# Patient Record
Sex: MALE | Race: WHITE | NOT HISPANIC OR LATINO
[De-identification: names, ages, dates, MRNs, and addresses within clinical notes are randomized per-mention and may not be internally consistent; named-entity substitution may affect disease eponyms.]

---

## 2019-12-04 ENCOUNTER — TRANSCRIPTION ENCOUNTER (OUTPATIENT)
Age: 74
End: 2019-12-04

## 2020-02-16 ENCOUNTER — TRANSCRIPTION ENCOUNTER (OUTPATIENT)
Age: 75
End: 2020-02-16

## 2020-02-22 ENCOUNTER — TRANSCRIPTION ENCOUNTER (OUTPATIENT)
Age: 75
End: 2020-02-22

## 2024-03-04 ENCOUNTER — NON-APPOINTMENT (OUTPATIENT)
Age: 79
End: 2024-03-04

## 2024-03-05 ENCOUNTER — INPATIENT (INPATIENT)
Facility: HOSPITAL | Age: 79
LOS: 9 days | Discharge: HOME HEALTH | DRG: 291 | End: 2024-03-15
Attending: INTERNAL MEDICINE | Admitting: INTERNAL MEDICINE
Payer: MEDICARE

## 2024-03-05 VITALS
RESPIRATION RATE: 18 BRPM | TEMPERATURE: 98 F | DIASTOLIC BLOOD PRESSURE: 76 MMHG | HEART RATE: 75 BPM | OXYGEN SATURATION: 95 % | SYSTOLIC BLOOD PRESSURE: 127 MMHG

## 2024-03-05 DIAGNOSIS — R06.02 SHORTNESS OF BREATH: ICD-10-CM

## 2024-03-05 LAB
ALBUMIN SERPL ELPH-MCNC: 3.8 G/DL — SIGNIFICANT CHANGE UP (ref 3.5–5.2)
ALP SERPL-CCNC: 114 U/L — SIGNIFICANT CHANGE UP (ref 30–115)
ALT FLD-CCNC: 16 U/L — SIGNIFICANT CHANGE UP (ref 0–41)
ANION GAP SERPL CALC-SCNC: 13 MMOL/L — SIGNIFICANT CHANGE UP (ref 7–14)
ANISOCYTOSIS BLD QL: SLIGHT — SIGNIFICANT CHANGE UP
AST SERPL-CCNC: 18 U/L — SIGNIFICANT CHANGE UP (ref 0–41)
BASE EXCESS BLDV CALC-SCNC: -1.9 MMOL/L — SIGNIFICANT CHANGE UP (ref -2–3)
BASOPHILS # BLD AUTO: 0.03 K/UL — SIGNIFICANT CHANGE UP (ref 0–0.2)
BASOPHILS NFR BLD AUTO: 0.9 % — SIGNIFICANT CHANGE UP (ref 0–1)
BILIRUB SERPL-MCNC: 1.8 MG/DL — HIGH (ref 0.2–1.2)
BUN SERPL-MCNC: 21 MG/DL — HIGH (ref 10–20)
CA-I SERPL-SCNC: 1.22 MMOL/L — SIGNIFICANT CHANGE UP (ref 1.15–1.33)
CALCIUM SERPL-MCNC: 8.9 MG/DL — SIGNIFICANT CHANGE UP (ref 8.4–10.5)
CHLORIDE SERPL-SCNC: 110 MMOL/L — SIGNIFICANT CHANGE UP (ref 98–110)
CO2 SERPL-SCNC: 22 MMOL/L — SIGNIFICANT CHANGE UP (ref 17–32)
CREAT SERPL-MCNC: 1 MG/DL — SIGNIFICANT CHANGE UP (ref 0.7–1.5)
DACRYOCYTES BLD QL SMEAR: SLIGHT — SIGNIFICANT CHANGE UP
EGFR: 77 ML/MIN/1.73M2 — SIGNIFICANT CHANGE UP
EOSINOPHIL # BLD AUTO: 0.03 K/UL — SIGNIFICANT CHANGE UP (ref 0–0.7)
EOSINOPHIL NFR BLD AUTO: 0.9 % — SIGNIFICANT CHANGE UP (ref 0–8)
GAS PNL BLDV: 142 MMOL/L — SIGNIFICANT CHANGE UP (ref 136–145)
GAS PNL BLDV: SIGNIFICANT CHANGE UP
GAS PNL BLDV: SIGNIFICANT CHANGE UP
GIANT PLATELETS BLD QL SMEAR: PRESENT — SIGNIFICANT CHANGE UP
GLUCOSE SERPL-MCNC: 92 MG/DL — SIGNIFICANT CHANGE UP (ref 70–99)
HCO3 BLDV-SCNC: 24 MMOL/L — SIGNIFICANT CHANGE UP (ref 22–29)
HCT VFR BLD CALC: 37.6 % — LOW (ref 42–52)
HCT VFR BLDA CALC: 36 % — LOW (ref 39–51)
HGB BLD CALC-MCNC: 11.9 G/DL — LOW (ref 12.6–17.4)
HGB BLD-MCNC: 11.6 G/DL — LOW (ref 14–18)
LACTATE BLDV-MCNC: 2.8 MMOL/L — HIGH (ref 0.5–2)
LYMPHOCYTES # BLD AUTO: 1.06 K/UL — LOW (ref 1.2–3.4)
LYMPHOCYTES # BLD AUTO: 36.4 % — SIGNIFICANT CHANGE UP (ref 20.5–51.1)
MANUAL SMEAR VERIFICATION: SIGNIFICANT CHANGE UP
MCHC RBC-ENTMCNC: 27.2 PG — SIGNIFICANT CHANGE UP (ref 27–31)
MCHC RBC-ENTMCNC: 30.9 G/DL — LOW (ref 32–37)
MCV RBC AUTO: 88.3 FL — SIGNIFICANT CHANGE UP (ref 80–94)
MICROCYTES BLD QL: SLIGHT — SIGNIFICANT CHANGE UP
MONOCYTES # BLD AUTO: 0.43 K/UL — SIGNIFICANT CHANGE UP (ref 0.1–0.6)
MONOCYTES NFR BLD AUTO: 14.6 % — HIGH (ref 1.7–9.3)
MYELOCYTES NFR BLD: 0.9 % — HIGH (ref 0–0)
NEUTROPHILS # BLD AUTO: 1.25 K/UL — LOW (ref 1.4–6.5)
NEUTROPHILS NFR BLD AUTO: 42.7 % — SIGNIFICANT CHANGE UP (ref 42.2–75.2)
NRBC # BLD: 12 /100 WBCS — HIGH (ref 0–0)
NRBC # BLD: SIGNIFICANT CHANGE UP /100 WBCS (ref 0–0)
NT-PROBNP SERPL-SCNC: HIGH PG/ML (ref 0–300)
PCO2 BLDV: 43 MMHG — SIGNIFICANT CHANGE UP (ref 42–55)
PH BLDV: 7.35 — SIGNIFICANT CHANGE UP (ref 7.32–7.43)
PLAT MORPH BLD: NORMAL — SIGNIFICANT CHANGE UP
PLATELET # BLD AUTO: 63 K/UL — LOW (ref 130–400)
PMV BLD: 11.9 FL — HIGH (ref 7.4–10.4)
PO2 BLDV: 18 MMHG — LOW (ref 25–45)
POIKILOCYTOSIS BLD QL AUTO: SIGNIFICANT CHANGE UP
POTASSIUM BLDV-SCNC: 3.3 MMOL/L — LOW (ref 3.5–5.1)
POTASSIUM SERPL-MCNC: 3.4 MMOL/L — LOW (ref 3.5–5)
POTASSIUM SERPL-SCNC: 3.4 MMOL/L — LOW (ref 3.5–5)
PROT SERPL-MCNC: 7.6 G/DL — SIGNIFICANT CHANGE UP (ref 6–8)
RBC # BLD: 4.26 M/UL — LOW (ref 4.7–6.1)
RBC # FLD: 21.1 % — HIGH (ref 11.5–14.5)
RBC BLD AUTO: ABNORMAL
SAO2 % BLDV: 16 % — LOW (ref 67–88)
SMUDGE CELLS # BLD: PRESENT — SIGNIFICANT CHANGE UP
SODIUM SERPL-SCNC: 145 MMOL/L — SIGNIFICANT CHANGE UP (ref 135–146)
TROPONIN T, HIGH SENSITIVITY RESULT: 42 NG/L — HIGH (ref 6–21)
TROPONIN T, HIGH SENSITIVITY RESULT: 42 NG/L — HIGH (ref 6–21)
VARIANT LYMPHS # BLD: 3.6 % — SIGNIFICANT CHANGE UP (ref 0–5)
WBC # BLD: 2.92 K/UL — LOW (ref 4.8–10.8)
WBC # FLD AUTO: 2.92 K/UL — LOW (ref 4.8–10.8)

## 2024-03-05 PROCEDURE — 97110 THERAPEUTIC EXERCISES: CPT | Mod: GP

## 2024-03-05 PROCEDURE — 87340 HEPATITIS B SURFACE AG IA: CPT

## 2024-03-05 PROCEDURE — 97161 PT EVAL LOW COMPLEX 20 MIN: CPT | Mod: GP

## 2024-03-05 PROCEDURE — 85025 COMPLETE CBC W/AUTO DIFF WBC: CPT

## 2024-03-05 PROCEDURE — 87389 HIV-1 AG W/HIV-1&-2 AB AG IA: CPT

## 2024-03-05 PROCEDURE — 88264 CHROMOSOME ANALYSIS 20-25: CPT

## 2024-03-05 PROCEDURE — 88275 CYTOGENETICS 100-300: CPT

## 2024-03-05 PROCEDURE — 71045 X-RAY EXAM CHEST 1 VIEW: CPT

## 2024-03-05 PROCEDURE — 87205 SMEAR GRAM STAIN: CPT

## 2024-03-05 PROCEDURE — 76705 ECHO EXAM OF ABDOMEN: CPT

## 2024-03-05 PROCEDURE — 81451 HL NEO GSAP 5-50 RNA ALYS: CPT

## 2024-03-05 PROCEDURE — 74177 CT ABD & PELVIS W/CONTRAST: CPT | Mod: MC

## 2024-03-05 PROCEDURE — 81450 HL NEO GSAP 5-50DNA/DNA&RNA: CPT

## 2024-03-05 PROCEDURE — 86803 HEPATITIS C AB TEST: CPT

## 2024-03-05 PROCEDURE — 93306 TTE W/DOPPLER COMPLETE: CPT

## 2024-03-05 PROCEDURE — 83010 ASSAY OF HAPTOGLOBIN QUANT: CPT

## 2024-03-05 PROCEDURE — 84484 ASSAY OF TROPONIN QUANT: CPT

## 2024-03-05 PROCEDURE — 93010 ELECTROCARDIOGRAM REPORT: CPT

## 2024-03-05 PROCEDURE — 99223 1ST HOSP IP/OBS HIGH 75: CPT

## 2024-03-05 PROCEDURE — 83540 ASSAY OF IRON: CPT

## 2024-03-05 PROCEDURE — 36415 COLL VENOUS BLD VENIPUNCTURE: CPT

## 2024-03-05 PROCEDURE — 83735 ASSAY OF MAGNESIUM: CPT

## 2024-03-05 PROCEDURE — 86706 HEP B SURFACE ANTIBODY: CPT

## 2024-03-05 PROCEDURE — 80061 LIPID PANEL: CPT

## 2024-03-05 PROCEDURE — 86038 ANTINUCLEAR ANTIBODIES: CPT

## 2024-03-05 PROCEDURE — 88305 TISSUE EXAM BY PATHOLOGIST: CPT

## 2024-03-05 PROCEDURE — 88341 IMHCHEM/IMCYTCHM EA ADD ANTB: CPT

## 2024-03-05 PROCEDURE — 88271 CYTOGENETICS DNA PROBE: CPT

## 2024-03-05 PROCEDURE — 85045 AUTOMATED RETICULOCYTE COUNT: CPT

## 2024-03-05 PROCEDURE — 88184 FLOWCYTOMETRY/ TC 1 MARKER: CPT

## 2024-03-05 PROCEDURE — 85610 PROTHROMBIN TIME: CPT

## 2024-03-05 PROCEDURE — 85730 THROMBOPLASTIN TIME PARTIAL: CPT

## 2024-03-05 PROCEDURE — 94640 AIRWAY INHALATION TREATMENT: CPT

## 2024-03-05 PROCEDURE — 88342 IMHCHEM/IMCYTCHM 1ST ANTB: CPT

## 2024-03-05 PROCEDURE — 83921 ORGANIC ACID SINGLE QUANT: CPT

## 2024-03-05 PROCEDURE — 88313 SPECIAL STAINS GROUP 2: CPT

## 2024-03-05 PROCEDURE — 83036 HEMOGLOBIN GLYCOSYLATED A1C: CPT

## 2024-03-05 PROCEDURE — 97116 GAIT TRAINING THERAPY: CPT | Mod: GP

## 2024-03-05 PROCEDURE — 93971 EXTREMITY STUDY: CPT | Mod: RT

## 2024-03-05 PROCEDURE — 82728 ASSAY OF FERRITIN: CPT

## 2024-03-05 PROCEDURE — 88237 TISSUE CULTURE BONE MARROW: CPT

## 2024-03-05 PROCEDURE — 88280 CHROMOSOME KARYOTYPE STUDY: CPT

## 2024-03-05 PROCEDURE — 80053 COMPREHEN METABOLIC PANEL: CPT

## 2024-03-05 PROCEDURE — 71260 CT THORAX DX C+: CPT | Mod: MC

## 2024-03-05 PROCEDURE — 82746 ASSAY OF FOLIC ACID SERUM: CPT

## 2024-03-05 PROCEDURE — 83880 ASSAY OF NATRIURETIC PEPTIDE: CPT

## 2024-03-05 PROCEDURE — 83605 ASSAY OF LACTIC ACID: CPT

## 2024-03-05 PROCEDURE — 80048 BASIC METABOLIC PNL TOTAL CA: CPT

## 2024-03-05 PROCEDURE — 99285 EMERGENCY DEPT VISIT HI MDM: CPT | Mod: FS

## 2024-03-05 PROCEDURE — 83550 IRON BINDING TEST: CPT

## 2024-03-05 PROCEDURE — 86704 HEP B CORE ANTIBODY TOTAL: CPT

## 2024-03-05 PROCEDURE — 84443 ASSAY THYROID STIM HORMONE: CPT

## 2024-03-05 PROCEDURE — 83615 LACTATE (LD) (LDH) ENZYME: CPT

## 2024-03-05 PROCEDURE — 71046 X-RAY EXAM CHEST 2 VIEWS: CPT | Mod: 26

## 2024-03-05 PROCEDURE — 88311 DECALCIFY TISSUE: CPT

## 2024-03-05 PROCEDURE — 82607 VITAMIN B-12: CPT

## 2024-03-05 PROCEDURE — 97530 THERAPEUTIC ACTIVITIES: CPT | Mod: GP

## 2024-03-05 PROCEDURE — 86880 COOMBS TEST DIRECT: CPT

## 2024-03-05 PROCEDURE — 88185 FLOWCYTOMETRY/TC ADD-ON: CPT

## 2024-03-05 RX ORDER — INFLUENZA VIRUS VACCINE 15; 15; 15; 15 UG/.5ML; UG/.5ML; UG/.5ML; UG/.5ML
0.7 SUSPENSION INTRAMUSCULAR ONCE
Refills: 0 | Status: DISCONTINUED | OUTPATIENT
Start: 2024-03-05 | End: 2024-03-15

## 2024-03-05 RX ORDER — ENOXAPARIN SODIUM 100 MG/ML
40 INJECTION SUBCUTANEOUS EVERY 24 HOURS
Refills: 0 | Status: DISCONTINUED | OUTPATIENT
Start: 2024-03-05 | End: 2024-03-07

## 2024-03-05 RX ORDER — FUROSEMIDE 40 MG
40 TABLET ORAL EVERY 12 HOURS
Refills: 0 | Status: DISCONTINUED | OUTPATIENT
Start: 2024-03-05 | End: 2024-03-07

## 2024-03-05 RX ORDER — POTASSIUM CHLORIDE 20 MEQ
40 PACKET (EA) ORAL ONCE
Refills: 0 | Status: COMPLETED | OUTPATIENT
Start: 2024-03-05 | End: 2024-03-05

## 2024-03-05 RX ORDER — FUROSEMIDE 40 MG
40 TABLET ORAL ONCE
Refills: 0 | Status: COMPLETED | OUTPATIENT
Start: 2024-03-05 | End: 2024-03-05

## 2024-03-05 RX ADMIN — Medication 40 MILLIEQUIVALENT(S): at 21:16

## 2024-03-05 RX ADMIN — Medication 40 MILLIGRAM(S): at 20:12

## 2024-03-05 NOTE — H&P ADULT - NSHPPHYSICALEXAM_GEN_ALL_CORE
LOS:     VITALS:   T(C): 36.4 (03-05-24 @ 19:20), Max: 36.4 (03-05-24 @ 14:28)  HR: 98 (03-05-24 @ 19:20) (75 - 98)  BP: 137/71 (03-05-24 @ 19:20) (127/76 - 137/71)  RR: 18 (03-05-24 @ 19:20) (18 - 18)  SpO2: 98% (03-05-24 @ 19:20) (95% - 98%)    GENERAL: NAD, lying in bed comfortably  HEAD:  Atraumatic, Normocephalic  EYES: EOMI, PERRLA, conjunctiva and sclera clear  ENT: Moist mucous membranes  NECK: Supple, No JVD  CHEST/LUNG: Clear to auscultation bilaterally; No rales, rhonchi, wheezing, or rubs. Unlabored respirations  HEART: Regular rate and rhythm; No murmurs, rubs, or gallops  ABDOMEN: BSx4; Soft, nontender, nondistended  EXTREMITIES:  2+ Peripheral Pulses, brisk capillary refill. No clubbing, cyanosis, or edema  NERVOUS SYSTEM:  A&Ox3, 5/5 motor bilateral upper and lower extremity, no sensory deficit   SKIN: No rashes or lesions LOS:     VITALS:   T(C): 36.4 (03-05-24 @ 19:20), Max: 36.4 (03-05-24 @ 14:28)  HR: 98 (03-05-24 @ 19:20) (75 - 98)  BP: 137/71 (03-05-24 @ 19:20) (127/76 - 137/71)  RR: 18 (03-05-24 @ 19:20) (18 - 18)  SpO2: 98% (03-05-24 @ 19:20) (95% - 98%)    GENERAL: NAD, lying in bed comfortably  HEAD:  Atraumatic, Normocephalic  EYES: EOMI, PERRLA, conjunctiva and sclera clear  ENT: Moist mucous membranes  NECK: Supple, No JVD  CHEST/LUNG: bilateral diffuse crackles more at bases   HEART: Regular rate and rhythm; No murmurs, rubs, or gallops  ABDOMEN: BSx4; Soft, nontender, nondistended  EXTREMITIES:  2+ Peripheral Pulses, +1 bilateral ankle edema   NERVOUS SYSTEM:  A&Ox3, 5/5 motor bilateral upper and lower extremity, no sensory deficit   SKIN: No rashes or lesions

## 2024-03-05 NOTE — ED ADULT TRIAGE NOTE - CHIEF COMPLAINT QUOTE
Pt BIBA from urgent care c/o feeling sob x 1 week, (covid test done at [facility] was negative)

## 2024-03-05 NOTE — ED PROVIDER NOTE - ATTENDING APP SHARED VISIT CONTRIBUTION OF CARE
80 yo m denies pmh  pt presents for eval of sob. sx worsening over the past week. pt went to  and was ref to ED.  pt former smoker. no known sick contacts. pt had swab at  and covid was negative.  no chest pain. + ankle swelling- chronic per pt.  no cough.  sob constant.     vss  gen- NAD, aaox3  card-rrr  lungs-ctab, no wheezing or rhonchi  abd-sntnd, no guarding or rebound  neuro- full str/sensation, cn ii-xii grossly intact, normal coordination   LE- mild ankle swelling b/l, no calf tenderness

## 2024-03-05 NOTE — H&P ADULT - ASSESSMENT
#Shortness of breath 2/2 to volume overload   #R/O new onset CHF   - hemodynamically stable   - spo2 98% on 3.5 L NC   - trop 42 --. 42, pro-BN [id number]  - VBG: lactate 2.8, pH 7.35, pCO2 43    - EKG: sinus with PVCs, LVH   - CXR bilateral basilar opacities, hilar congestion, increased vascular markings   - s/p lasix 40 mg IV once in ED   - c/w lasix 40 mg IV BID   - strict I&O  - get TTE   - monitor on tele   - repeat tropx3   - monitor oxygen requirements   - repeat CXR in AM   - f/u covid/flu/RSV   - keep K > 4 and Mg > 2   - once euvolemic and respiratory status improves needs cardio eval for   ischemic workup ( r/o ischemic heart disease as cause of new onset HF )   - check A1C, lipid panel and TSH   - monitor BP, consider starting ACE/ARB if BP tolerates     #Pancytopenia   - WBC 2.92, Hb 11.6, MCV 88.3, Plt 63, ANC 1250   - f/u retic count, LDH, iron studies, B12, folate, dino test, TSH   HIV, hep B and C profile, INR   - f/u RUQ US to r/o liver disease as cause of pancytopenia   - if all workup negative, call hem/onc for BM biopsy     #DVT ppx: lovenox   #GI ppx: NA   #Diet: DASH   #Activity: as tolerated   #Dispo: tele  80 yo with no PMH presents for eval of new onset shortness of breath for the past few days. Patient reported that he started feeling SOB for the past few days at rest and on exertion and it was progressively worsening where he couldn't sleep for the past 2 nights bcz of orthopnea. He reported associated bilateral ankle swelling.    #Shortness of breath 2/2 to volume overload   #R/O new onset CHF   - hemodynamically stable   - spo2 98% on 3.5 L NC   - trop 42 --. 42, pro-BN 11005  - VBG: lactate 2.8, pH 7.35, pCO2 43    - EKG: sinus with PVCs, LVH   - CXR bilateral basilar opacities, hilar congestion, increased vascular markings   - s/p lasix 40 mg IV once in ED   - c/w lasix 40 mg IV BID   - strict I&O  - get TTE   - monitor on tele   - repeat tropx3   - monitor oxygen requirements   - repeat CXR in AM   - f/u covid/flu/RSV   - keep K > 4 and Mg > 2   - once euvolemic and respiratory status improves needs cardio eval for   ischemic workup ( r/o ischemic heart disease as cause of new onset HF )   - check A1C, lipid panel and TSH   - monitor BP, consider starting ACE/ARB if BP tolerates     #Pancytopenia   - WBC 2.92, Hb 11.6, MCV 88.3, Plt 63, ANC 1250   - no B symptoms, no hx of alcohol use   - f/u retic count, LDH, iron studies, B12, folate, dino test, TSH   HIV, hep B and C profile, INR   - f/u RUQ US to r/o liver disease as cause of pancytopenia   - if all workup negative, call hem/onc for BM biopsy     #DVT ppx: lovenox   #GI ppx: NA   #Diet: DASH   #Activity: as tolerated   #Dispo: tele   *** Zulma Jarvis at bedside, diagnosis and workup explained in details, requesting to be involved and updated about all workup and findings by primary team.

## 2024-03-05 NOTE — ED PROVIDER NOTE - CLINICAL SUMMARY MEDICAL DECISION MAKING FREE TEXT BOX
Throughout ED observation period, pt remained clinically and hemodynamically stable.  labs significant for pancytopenia, no anemia req transfusion  trop elevated  IOllie- ED Attending, interpreted the EKG- sinus rhythm, rate-93 , no acute ischaemic changes, no high degree blocks  Ollie LAW- ED Attending, interpreted the chest x-ray - b/l opacities  will admit for c/f new onset chf, pancytopenia, further tx and w/u

## 2024-03-05 NOTE — ED PROVIDER NOTE - OBJECTIVE STATEMENT
78 yo male with no known pmh presents c/o SOB for the past week that has been gradually worsening. pt denies any other symptoms including fevers, chill, headache, recent illness/travel, cough, abdominal pain, chest pain.

## 2024-03-05 NOTE — PATIENT PROFILE ADULT - FUNCTIONAL ASSESSMENT - BASIC MOBILITY 1.
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  Also had a deep massage    Pain is mainly in his lower back muscles and hips     Due to heart hx he does take NSAIDS told Tyl only    He has tried Tyl in past and does not work at all for him     3 of his co workers who do similar /same job are taking a muscle relaxer and told maybe this would help him as well    He is wondering if he can try a muscle relaxer?    #2 He also wanted to add that his R wrist has something going on with it. He thinks it is due from falling 4-5 years ago at work. As when he fell he  landed on R hand. Saw YOUNG Villareal day of fall and told no fx - that he hyperextended it and he had to wear a brace for months     He can do a 360 with eft wrist but cannot even go half way back with the left wrist    He is wondering if he can have a Left wrist x ray?    He would be willing to come in but with this weather he has been so busy starting early am hours     3 = A little assistance

## 2024-03-05 NOTE — ED PROVIDER NOTE - PHYSICAL EXAMINATION
Gen: NAD, AOx3  Head: NCAT  HEENT: PERRL, oral mucosa moist, normal conjunctiva, oropharynx clear without exudate or erythema  Lung: CTAB, no respiratory distress, no wheezing, rales, rhonchi  CV: normal s1/s2, rrr, Normal perfusion, pulses 2+ throughout  Abd: soft, NTND, no CVA tenderness  Genitourinary: no pelvic tenderness  MSK: mild edema to bilateral ankles, no visible deformities, full range of motion in all 4 extremities  Neuro: CN II-XII grossly intact, No focal neurologic deficits  Skin: No rash   Psych: normal affect

## 2024-03-05 NOTE — H&P ADULT - HISTORY OF PRESENT ILLNESS
80 yo with no PMH  presents for eval of sob. sx worsening over the past week. pt went to  and was ref to ED.  pt former smoker. no known sick contacts. pt had swab at  and covid was negative.  no chest pain. + ankle swelling- chronic per pt.  no cough.  sob constant.    Vitals: T 97.6, HR 75, /76, spo2 98% on 3.5 L NC   Labs: WBC 2.92, Hb 11.6, MCV 88.3, Plt 63, ANC 1250   trop 42 --. 42, K 3.4, Tb 1.8, pro-BN 97339  VBG: lactate 2.8, pH 7.35, pCO2 43    EKG: sinus with PVCs, LVH   CXR bilateral basilar opacities, hilar congestion, increased vascular markings     s/p lasix 40 mg IV once in ED  80 yo with no PMH presents for eval of new onset shortness of breath for the past few days. Patient reported that he started feeling SOB for the past few days at rest and on exertion and it was progressively worsening where he couldn't sleep for the past 2 nights bcz of orthopnea. He reported associated bilateral ankle swelling. Denies chest pain, palpitations, fever, chills, abdominal pain. This is the first time he experiences such sxs. He is usually an active man, never felt SOB or chest pain on exertion or climbing up the stairs. No known sick contacts, no recent travel, ex heavy smoker ( 1 pack per day > 30 yrs, stopped 8 yrs ago ), no alcohol use, no recent weight loss or night sweats. He did not follow u with a PCP for years.     Vitals: T 97.6, HR 75, /76, spo2 98% on 3.5 L NC   Labs: WBC 2.92, Hb 11.6, MCV 88.3, Plt 63, ANC 1250   trop 42 --. 42, K 3.4, Tb 1.8, pro-BN [id number]  VBG: lactate 2.8, pH 7.35, pCO2 43    EKG: sinus with PVCs, LVH   CXR bilateral basilar opacities, hilar congestion, increased vascular markings     s/p lasix 40 mg IV once in ED     Patient admitted for workup of new onset CHF and pancytopenia

## 2024-03-05 NOTE — H&P ADULT - ATTENDING COMMENTS
Patient seen and examined at bedside independently of the residents. I read the resident's note and agree with the plan with the additions and corrections as noted below. My note supersedes the resident's note.     REVIEW OF SYSTEMS:  Negative except in HPI.     PMH: None.     FHx: Reviewed. No fhx of asthma/copd, No fhx of liver and pulmonary disease. No fhx of hematological disorder.     Physical Exam:  GEN: No acute distress. Awake, Alert and oriented x 3.   Head: Atraumatic, Normocephalic.   Eye: PEERLA. No sclera icterus. EOMI.   ENT: Normal oropharynx, no thyromegaly, no mass, no lymphadenopathy.   LUNGS: Bibasilar crackles b/l lung fields.   HEART: Normal. S1/S2 present. RRR. No murmur/gallops.   ABD: Soft, non-tender, non-distended. Bowel sounds present.   EXT: 1+ pitting edema b/l LE . No erythema. No tenderness.  Integumentary: No rash, No sore, No petechia.   NEURO: CN III-XII intact. Strength: 5/5 b/l ULE. Sensory intact b/l ULE.     Vital Signs Last 24 Hrs  T(C): 36.4 (05 Mar 2024 19:20), Max: 36.4 (05 Mar 2024 14:28)  T(F): 97.6 (05 Mar 2024 19:20), Max: 97.6 (05 Mar 2024 14:28)  HR: 98 (05 Mar 2024 19:20) (75 - 98)  BP: 137/71 (05 Mar 2024 19:20) (127/76 - 137/71)  BP(mean): --  RR: 18 (05 Mar 2024 19:20) (18 - 18)  SpO2: 98% (05 Mar 2024 19:20) (95% - 98%)    Parameters below as of 05 Mar 2024 19:20  Patient On (Oxygen Delivery Method): nasal cannula  O2 Flow (L/min): 3.5L    I&O's Summary    05 Mar 2024 07:01  -  06 Mar 2024 04:28  --------------------------------------------------------  IN: 0 mL / OUT: 925 mL / NET: -925 mL      Please see the above notes for Labs and radiology.     Assessment and Plan:     78 yo M with no significant PMH presents to ED for worsening BARRIENTOS.     Acute hypoxic respiratory failure 2/2 pulmonary edema 2/2 suspected new onset CHF  - CXR shows pulmonary vascular congestion with b/l small pleural effusion to my read --> pending official report.   - proBNP 09046  - s/p IV lasix 40mg x 1 given in ED.   - c/w IV lasix 40mg BID   - check TTE   - monitor daily weight, strict I & O, Low Na diet with fluid restriction.   - monitor on Telemetry.   - Consider cardio eval in AM once TTE resulted.     Pancytopenia  - check retic count, Fe studies, B2, folate, LDH, hemolysis panel, TSH, Coag panel  - check HIV, hepatitis panel  - check RUQ US   - Consider hematology evaluation if above w/u neg.     DVT ppx: Lovenox SC  GI ppx: not indicated.   Diet: DASH diet  Activity: as tolerated.     Date seen by the attendin2024  Total time spent: 75 minutes.

## 2024-03-05 NOTE — ED ADULT NURSE NOTE - CHIEF COMPLAINT QUOTE
Pt BIBA from urgent care c/o feeling sob x 1 week, (covid test done at [facility] was negative)

## 2024-03-05 NOTE — PATIENT PROFILE ADULT - FUNCTIONAL ASSESSMENT - BASIC MOBILITY 6.
3-calculated by average/Not able to assess (calculate score using [facility] averaging method)

## 2024-03-06 LAB
A1C WITH ESTIMATED AVERAGE GLUCOSE RESULT: 4.5 % — SIGNIFICANT CHANGE UP (ref 4–5.6)
ALBUMIN SERPL ELPH-MCNC: 3.7 G/DL — SIGNIFICANT CHANGE UP (ref 3.5–5.2)
ALP SERPL-CCNC: 105 U/L — SIGNIFICANT CHANGE UP (ref 30–115)
ALT FLD-CCNC: 17 U/L — SIGNIFICANT CHANGE UP (ref 0–41)
ANION GAP SERPL CALC-SCNC: 11 MMOL/L — SIGNIFICANT CHANGE UP (ref 7–14)
APTT BLD: 32.4 SEC — SIGNIFICANT CHANGE UP (ref 27–39.2)
AST SERPL-CCNC: 18 U/L — SIGNIFICANT CHANGE UP (ref 0–41)
BASOPHILS # BLD AUTO: 0.04 K/UL — SIGNIFICANT CHANGE UP (ref 0–0.2)
BASOPHILS NFR BLD AUTO: 1.6 % — HIGH (ref 0–1)
BILIRUB SERPL-MCNC: 2 MG/DL — HIGH (ref 0.2–1.2)
BUN SERPL-MCNC: 21 MG/DL — HIGH (ref 10–20)
CALCIUM SERPL-MCNC: 8.5 MG/DL — SIGNIFICANT CHANGE UP (ref 8.4–10.5)
CHLORIDE SERPL-SCNC: 107 MMOL/L — SIGNIFICANT CHANGE UP (ref 98–110)
CHOLEST SERPL-MCNC: 99 MG/DL — SIGNIFICANT CHANGE UP
CO2 SERPL-SCNC: 25 MMOL/L — SIGNIFICANT CHANGE UP (ref 17–32)
CREAT SERPL-MCNC: 1 MG/DL — SIGNIFICANT CHANGE UP (ref 0.7–1.5)
DAT C3-SP REAG RBC QL: ABNORMAL
DAT IGG-SP REAG RBC-IMP: ABNORMAL
DIR ANTIGLOB POLYSPECIFIC INTERPRETATION: ABNORMAL
EGFR: 77 ML/MIN/1.73M2 — SIGNIFICANT CHANGE UP
EOSINOPHIL # BLD AUTO: 0.01 K/UL — SIGNIFICANT CHANGE UP (ref 0–0.7)
EOSINOPHIL NFR BLD AUTO: 0.4 % — SIGNIFICANT CHANGE UP (ref 0–8)
ESTIMATED AVERAGE GLUCOSE: 82 MG/DL — SIGNIFICANT CHANGE UP (ref 68–114)
FERRITIN SERPL-MCNC: 175 NG/ML — SIGNIFICANT CHANGE UP (ref 30–400)
FOLATE SERPL-MCNC: 10.7 NG/ML — SIGNIFICANT CHANGE UP
GLUCOSE SERPL-MCNC: 86 MG/DL — SIGNIFICANT CHANGE UP (ref 70–99)
HBV CORE AB SER-ACNC: SIGNIFICANT CHANGE UP
HBV SURFACE AB SER-ACNC: SIGNIFICANT CHANGE UP
HBV SURFACE AG SER-ACNC: SIGNIFICANT CHANGE UP
HCT VFR BLD CALC: 34.9 % — LOW (ref 42–52)
HCV AB S/CO SERPL IA: 0.05 COI — SIGNIFICANT CHANGE UP
HCV AB SERPL-IMP: SIGNIFICANT CHANGE UP
HDLC SERPL-MCNC: 20 MG/DL — LOW
HGB BLD-MCNC: 10.7 G/DL — LOW (ref 14–18)
IMM GRANULOCYTES NFR BLD AUTO: 0.8 % — HIGH (ref 0.1–0.3)
INR BLD: 1.25 RATIO — SIGNIFICANT CHANGE UP (ref 0.65–1.3)
IRON SATN MFR SERPL: 34 % — SIGNIFICANT CHANGE UP (ref 15–50)
IRON SATN MFR SERPL: 80 UG/DL — SIGNIFICANT CHANGE UP (ref 35–150)
LACTATE SERPL-SCNC: 1.3 MMOL/L — SIGNIFICANT CHANGE UP (ref 0.7–2)
LDH SERPL L TO P-CCNC: 301 U/L — HIGH (ref 50–242)
LIPID PNL WITH DIRECT LDL SERPL: 57 MG/DL — SIGNIFICANT CHANGE UP
LYMPHOCYTES # BLD AUTO: 0.93 K/UL — LOW (ref 1.2–3.4)
LYMPHOCYTES # BLD AUTO: 38.3 % — SIGNIFICANT CHANGE UP (ref 20.5–51.1)
MAGNESIUM SERPL-MCNC: 2.1 MG/DL — SIGNIFICANT CHANGE UP (ref 1.8–2.4)
MCHC RBC-ENTMCNC: 27.5 PG — SIGNIFICANT CHANGE UP (ref 27–31)
MCHC RBC-ENTMCNC: 30.7 G/DL — LOW (ref 32–37)
MCV RBC AUTO: 89.7 FL — SIGNIFICANT CHANGE UP (ref 80–94)
MONOCYTES # BLD AUTO: 0.26 K/UL — SIGNIFICANT CHANGE UP (ref 0.1–0.6)
MONOCYTES NFR BLD AUTO: 10.7 % — HIGH (ref 1.7–9.3)
NEUTROPHILS # BLD AUTO: 1.17 K/UL — LOW (ref 1.4–6.5)
NEUTROPHILS NFR BLD AUTO: 48.2 % — SIGNIFICANT CHANGE UP (ref 42.2–75.2)
NON HDL CHOLESTEROL: 79 MG/DL — SIGNIFICANT CHANGE UP
NRBC # BLD: 7 /100 WBCS — HIGH (ref 0–0)
PLATELET # BLD AUTO: 51 K/UL — LOW (ref 130–400)
PMV BLD: 12.3 FL — HIGH (ref 7.4–10.4)
POTASSIUM SERPL-MCNC: 3.7 MMOL/L — SIGNIFICANT CHANGE UP (ref 3.5–5)
POTASSIUM SERPL-SCNC: 3.7 MMOL/L — SIGNIFICANT CHANGE UP (ref 3.5–5)
PROT SERPL-MCNC: 7.2 G/DL — SIGNIFICANT CHANGE UP (ref 6–8)
PROTHROM AB SERPL-ACNC: 14.3 SEC — HIGH (ref 9.95–12.87)
RBC # BLD: 3.89 M/UL — LOW (ref 4.7–6.1)
RBC # BLD: 3.89 M/UL — LOW (ref 4.7–6.1)
RBC # FLD: 20.7 % — HIGH (ref 11.5–14.5)
RETICS #: 159.1 K/UL — HIGH (ref 25–125)
RETICS/RBC NFR: 4.1 % — HIGH (ref 0.5–1.5)
SODIUM SERPL-SCNC: 143 MMOL/L — SIGNIFICANT CHANGE UP (ref 135–146)
TIBC SERPL-MCNC: 235 UG/DL — SIGNIFICANT CHANGE UP (ref 220–430)
TRIGL SERPL-MCNC: 108 MG/DL — SIGNIFICANT CHANGE UP
TROPONIN T, HIGH SENSITIVITY RESULT: 48 NG/L — HIGH (ref 6–21)
TSH SERPL-MCNC: 1.27 UIU/ML — SIGNIFICANT CHANGE UP (ref 0.27–4.2)
UIBC SERPL-MCNC: 155 UG/DL — SIGNIFICANT CHANGE UP (ref 110–370)
VIT B12 SERPL-MCNC: 310 PG/ML — SIGNIFICANT CHANGE UP (ref 232–1245)
WBC # BLD: 2.43 K/UL — LOW (ref 4.8–10.8)
WBC # FLD AUTO: 2.43 K/UL — LOW (ref 4.8–10.8)

## 2024-03-06 PROCEDURE — 71045 X-RAY EXAM CHEST 1 VIEW: CPT | Mod: 26

## 2024-03-06 PROCEDURE — 99223 1ST HOSP IP/OBS HIGH 75: CPT

## 2024-03-06 PROCEDURE — 99233 SBSQ HOSP IP/OBS HIGH 50: CPT | Mod: GC

## 2024-03-06 PROCEDURE — 76705 ECHO EXAM OF ABDOMEN: CPT | Mod: 26

## 2024-03-06 RX ADMIN — Medication 40 MILLIGRAM(S): at 18:09

## 2024-03-06 RX ADMIN — Medication 40 MILLIGRAM(S): at 06:00

## 2024-03-06 NOTE — GOALS OF CARE CONVERSATION - ADVANCED CARE PLANNING - CONVERSATION DETAILS
spoke with pt regarding prognosis and care. Pt stated he was previosuly DNR/DNi and has a living will witgh his niece stating he wants to be DNR/ DNI. Confirmed with him during this stay that he would like to be DNR/ DNI. CONRAD filled out.

## 2024-03-06 NOTE — CONSULT NOTE ADULT - ASSESSMENT
78 yo with no PMH presents for eval of new onset shortness of breath for the past few days. Patient reported that he started feeling SOB for the past few days at rest and on exertion and it was progressively worsening where he couldn't sleep for the past 2 nights bcz of orthopnea. He reported associated bilateral ankle swelling.      Pancytopenia  - no prior blood work for review  - no hx of pancytopenia, malignancy, or hematological disorder per patient  - not taking any medications, denies alcohol and drug use  - Hemoglobin: 10.7 g/dL (03.06.24 @ 06:36)  - Mean Cell Volume: 89.7 fL (03.06.24 @ 06:36)  - Platelet Count - Automated: 51 K/uL (03.06.24 @ 06:36)  - WBC Count: 2.43 K/uL (03.06.24 @ 06:36)  - Auto Neutrophil #: 1.17 K/uL (03.06.24 @ 06:36)  - US liver no definite evidence of cirhosis     INCOMPLETE NOTE   78 yo with no PMH presents for eval of new onset shortness of breath for the past few days. Patient reported that he started feeling SOB for the past few days at rest and on exertion and it was progressively worsening where he couldn't sleep for the past 2 nights bcz of orthopnea. He reported associated bilateral ankle swelling.      Pancytopenia  - no prior blood work for review  - no hx of pancytopenia, malignancy, or hematological disorder per patient  - not taking any medications, denies alcohol and drug use  - Hemoglobin: 10.7 g/dL (03.06.24 @ 06:36)  - Mean Cell Volume: 89.7 fL (03.06.24 @ 06:36)  - Platelet Count - Automated: 51 K/uL (03.06.24 @ 06:36)  - WBC Count: 2.43 K/uL (03.06.24 @ 06:36)  - Auto Neutrophil #: 1.17 K/uL (03.06.24 @ 06:36)  - US liver no definite evidence of cirhosis   - B12 and folate level noted   - iron studies WNL     Recommendations:   - check MMA level  - recommend US spleen to assess for splenomegaly  - MRI liver to further assess for underlying cirrhosis given nonspecific US liver   - please send flow cytometry, will fill Requisition form and place in chart  - follow up haptoglobin  - no need to steroids at this time for + dino test  - monitor daily cbc w/ differential  - please repeat Dino test  - peripheral smear requested

## 2024-03-06 NOTE — PROGRESS NOTE ADULT - ATTENDING COMMENTS
My note supersedes all residents notes that I sign, My correction for their notes are in my notes   80 yo M with no significant PMH presents to ED for worsening BARRIENTOS.     Acute hypoxic respiratory failure 2/2 pulmonary edema 2/2 suspected new onset CHF  - CXR Bibasal pleural-parenchymal opacities.  CXR today Worsening opacifications, CHF.  Pt symptoms improving   - proBNP 20392  - s/p IV lasix 40mg x 1 given in ED.   - c/w IV lasix 40mg BID , monitor electrolytes and I/O and daily weight   - f/u TTE   - monitor daily weight, strict I & O, Low Na diet with fluid restriction.   - monitor on Telemetry.   - Consider cardio eval once TTE resulted.     Pancytopenia  - f/u anemia work up  Hematology consult   Renal sono with No definite liver cirrhosis. Liver echogenic foci measuring up to 2 cm, likely hemangiomas.      DVT ppx: Lovenox SC My note supersedes all residents notes that I sign, My correction for their notes are in my notes   80 yo M with no significant PMH presents to ED for worsening BARRIENTOS.     Acute hypoxic respiratory failure 2/2 pulmonary edema 2/2 suspected new onset CHF  - CXR Bibasal pleural-parenchymal opacities.  CXR today Worsening opacifications, CHF.  Pt symptoms improving   - proBNP 15727  - s/p IV lasix 40mg x 1 given in ED.   - c/w IV lasix 40mg BID , monitor electrolytes and I/O and daily weight   - f/u TTE   - monitor daily weight, strict I & O, Low Na diet with fluid restriction.   - monitor on Telemetry.   - Consider cardio eval once TTE resulted.   troponin 42--> 42--> 48   no active chest pain and the sob improved   repeat and get ekg today     Pancytopenia  - f/u anemia work up  Hematology consult   Renal sono with No definite liver cirrhosis. Liver echogenic foci measuring up to 2 cm, likely hemangiomas.      DVT ppx: Lovenox SC My note supersedes all residents notes that I sign, My correction for their notes are in my notes   80 yo M with no significant PMH presents to ED for worsening BARRIENTOS.     Acute hypoxic respiratory failure 2/2 pulmonary edema 2/2 suspected new onset CHF  - CXR Bibasal pleural-parenchymal opacities.  CXR today Worsening opacifications, CHF.  Pt symptoms improving   - proBNP 18277  - s/p IV lasix 40mg x 1 given in ED.   - c/w IV lasix 40mg BID , monitor electrolytes and I/O and daily weight   - f/u TTE   - monitor daily weight, strict I & O, Low Na diet with fluid restriction.   - monitor on Telemetry.   - Consider cardio eval once TTE resulted.   troponin 42--> 42--> 48   no active chest pain and the sob improved   repeat and get ekg today     Pancytopenia  - f/u anemia work up  Hematology consult   Renal sono with No definite liver cirrhosis. Liver echogenic foci measuring up to 2 cm, likely hemangiomas.      DVT ppx: Lovenox SC but monitor platelet

## 2024-03-06 NOTE — CONSULT NOTE ADULT - SUBJECTIVE AND OBJECTIVE BOX
Hematology Consult Note    HPI:  78 yo with no PMH presents for eval of new onset shortness of breath for the past few days. Patient reported that he started feeling SOB for the past few days at rest and on exertion and it was progressively worsening where he couldn't sleep for the past 2 nights bcz of orthopnea. He reported associated bilateral ankle swelling. Denies chest pain, palpitations, fever, chills, abdominal pain. This is the first time he experiences such sxs. He is usually an active man, never felt SOB or chest pain on exertion or climbing up the stairs. No known sick contacts, no recent travel, ex heavy smoker ( 1 pack per day > 30 yrs, stopped 8 yrs ago ), no alcohol use, no recent weight loss or night sweats. He did not follow u with a PCP for years.     Vitals: T 97.6, HR 75, /76, spo2 98% on 3.5 L NC   Labs: WBC 2.92, Hb 11.6, MCV 88.3, Plt 63, ANC 1250   trop 42 --. 42, K 3.4, Tb 1.8, pro-BN 99826  VBG: lactate 2.8, pH 7.35, pCO2 43    EKG: sinus with PVCs, LVH   CXR bilateral basilar opacities, hilar congestion, increased vascular markings     s/p lasix 40 mg IV once in ED     Patient admitted for workup of new onset CHF and pancytopenia  (05 Mar 2024 20:14)      Allergies    No Known Allergies    Intolerances        MEDICATIONS  (STANDING):  enoxaparin Injectable 40 milliGRAM(s) SubCutaneous every 24 hours  furosemide   Injectable 40 milliGRAM(s) IV Push every 12 hours  influenza  Vaccine (HIGH DOSE) 0.7 milliLiter(s) IntraMuscular once    MEDICATIONS  (PRN):      PAST MEDICAL & SURGICAL HISTORY:  No pertinent past medical history      No significant past surgical history          FAMILY HISTORY:  No pertinent family history in first degree relatives        SOCIAL HISTORY: No EtOH, no tobacco    REVIEW OF SYSTEMS:    CONSTITUTIONAL: No weakness, fevers or chills  EYES/ENT: No visual changes;  No vertigo or throat pain   NECK: No pain or stiffness  RESPIRATORY: No cough, wheezing, hemoptysis; No shortness of breath  CARDIOVASCULAR: No chest pain or palpitations  GASTROINTESTINAL: No abdominal or epigastric pain. No nausea, vomiting, or hematemesis; No diarrhea or constipation. No melena or hematochezia.  GENITOURINARY: No dysuria, frequency or hematuria  NEUROLOGICAL: No numbness or weakness  SKIN: No itching, burning, rashes, or lesions   All other review of systems is negative unless indicated above.        T(F): 97.3 (03-06-24 @ 12:17), Max: 97.6 (03-05-24 @ 19:20)  HR: 73 (03-06-24 @ 12:17)  BP: 98/58 (03-06-24 @ 12:17)  RR: 17 (03-06-24 @ 12:17)  SpO2: 98% (03-05-24 @ 19:20)  Wt(kg): --    GENERAL: NAD, well-developed  HEAD:  Atraumatic, Normocephalic  EYES: EOMI, PERRLA, conjunctiva and sclera clear  NECK: Supple, No JVD  CHEST/LUNG: Clear to auscultation bilaterally; No wheeze  HEART: Regular rate and rhythm; No murmurs, rubs, or gallops  ABDOMEN: Soft, Nontender, Nondistended; Bowel sounds present  EXTREMITIES:  2+ Peripheral Pulses, No clubbing, cyanosis, or edema  NEUROLOGY: non-focal  SKIN: No rashes or lesions                          10.7   2.43  )-----------( 51       ( 06 Mar 2024 06:36 )             34.9       03-06    143  |  107  |  21<H>  ----------------------------<  86  3.7   |  25  |  1.0    Ca    8.5      06 Mar 2024 06:36  Mg     2.1     03-06    TPro  7.2  /  Alb  3.7  /  TBili  2.0<H>  /  DBili  x   /  AST  18  /  ALT  17  /  AlkPhos  105  03-06      Lactate Dehydrogenase, Serum: 301 U/L (03-06 @ 06:36)  Magnesium: 2.1 mg/dL (03-06 @ 06:36)

## 2024-03-06 NOTE — PROGRESS NOTE ADULT - ASSESSMENT
78 yo with no PMH presents for eval of new onset shortness of breath for the past few days. Patient reported that he started feeling SOB for the past few days at rest and on exertion and it was progressively worsening where he couldn't sleep for the past 2 nights bcz of orthopnea. He reported associated bilateral ankle swelling.    #Shortness of breath 2/2 to volume overload   #R/O new onset CHF   - hemodynamically stable   - spo2 98% on 3.5 L NC   - trop 42 --. 42, pro-BN 15693  - VBG: lactate 2.8, pH 7.35, pCO2 43    - EKG: sinus with PVCs, LVH   - CXR bilateral basilar opacities, hilar congestion, increased vascular markings    - c/w lasix 40 mg IV BID   - strict I&O  - f/u on echo  - monitor oxygen requirements   - f/u covid/flu/RSV   - keep K > 4 and Mg > 2   - once euvolemic and respiratory status improves needs cardio eval for   ischemic workup ( r/o ischemic heart disease as cause of new onset HF ) - will assess after echo  - monitor BP, consider starting ACE/ARB if BP tolerates     #Pancytopenia   - WBC 2.92, Hb 11.6, MCV 88.3, Plt 63, ANC 1250   - no B symptoms, no hx of alcohol use   - f/u retic count, LDH, iron studies, B12, folate, dino test, TSH   HIV, hep B and C profile, INR   - f/u RUQ US to r/o liver disease as cause of pancytopenia   - Heme/onc consult    #DVT ppx: lovenox   #GI ppx: NA   #Diet: DASH   #Activity: as tolerated

## 2024-03-06 NOTE — PROGRESS NOTE ADULT - SUBJECTIVE AND OBJECTIVE BOX
24H events:    Patient is a 79y old Male who presents with a chief complaint of shortness of breath (05 Mar 2024 20:14)    Primary diagnosis of SOB (shortness of breath)    Today is hospital day 1d. This morning patient was seen and examined at bedside, resting comfortably in bed.    No acute or major events overnight.      PAST MEDICAL & SURGICAL HISTORY  No pertinent past medical history    No significant past surgical history      SOCIAL HISTORY:  Social History:      ALLERGIES:  No Known Allergies    MEDICATIONS:  STANDING MEDICATIONS  enoxaparin Injectable 40 milliGRAM(s) SubCutaneous every 24 hours  furosemide   Injectable 40 milliGRAM(s) IV Push every 12 hours  influenza  Vaccine (HIGH DOSE) 0.7 milliLiter(s) IntraMuscular once    PRN MEDICATIONS    VITALS:   T(F): 97  HR: 76  BP: 117/62  RR: 18  SpO2: 98%    PHYSICAL EXAM:  GENERAL:   ( x ) NAD, lying in bed comfortably     (  ) obtunded     (  ) lethargic     (  ) somnolent    HEAD:   ( x ) Atraumatic     (  ) hematoma     (  ) laceration (specify location:       )     NECK:  (  ) Supple     (  ) neck stiffness     (  ) nuchal rigidity     (  )  no JVD     (  ) JVD present ( -- cm)    HEART:  Rate -->     ( x ) normal rate     (  ) bradycardic     (  ) tachycardic  Rhythm -->     ( x ) regular     (  ) regularly irregular     (  ) irregularly irregular  Murmurs -->     ( x ) normal s1s2     (  ) systolic murmur     (  ) diastolic murmur     (  ) continuous murmur      (  ) S3 present     (  ) S4 present    LUNGS:   ( x )Unlabored respirations     (  ) tachypnea  ( x ) B/L air entry     (  ) decreased breath sounds in:  (location     )    (  ) no adventitious sound     (  ) crackles     (  ) wheezing      (  ) rhonchi      (specify location:       )  (  ) chest wall tenderness (specify location:       )    ABDOMEN:   ( x ) Soft     (  ) tense   |   (x  ) nondistended     (  ) distended   |   (  ) +BS     (  ) hypoactive bowel sounds     (  ) hyperactive bowel sounds  (  ) nontender     (  ) RUQ tenderness     (  ) RLQ tenderness     (  ) LLQ tenderness     (  ) epigastric tenderness     (  ) diffuse tenderness  (  ) Splenomegaly      (  ) Hepatomegaly      (  ) Jaundice     (  ) ecchymosis     EXTREMITIES:  (  ) Normal     (  ) Rash     (  ) ecchymosis     (  ) varicose veins      (  ) pitting edema     (  ) non-pitting edema   (  ) ulceration     (  ) gangrene:     (location:     )    NERVOUS SYSTEM:    (  ) A&Ox3     (  ) confused     (  ) lethargic  CN II-XII:     (  ) Intact     (  ) deficits found     (Specify:     )   Upper extremities:     (  ) no sensorimotor deficits     (  ) weakness     (  ) loss of proprioception/vibration     (  ) loss of touch/temperature (specify:    )  Lower extremities:     (  ) no sensorimotor deficits     (  ) weakness     (  ) loss of proprioception/vibration     (  ) loss of touch/temperature (specify:    )    SKIN:   (  ) No rashes or lesions     (  ) maculopapular rash     (  ) pustules     (  ) vesicles     (  ) ulcer     (  ) ecchymosis     (specify location:     )    AMPAC score:    (  ) Indwelling Tadeo Catheter:   Date insterted:    Reason (  ) Critical illness     (  ) urinary retention    (  ) Accurate Ins/Outs Monitoring     (  ) CMO patient    (  ) Central Line:   Date inserted:  Location: (  ) Right IJ     (  ) Left IJ     (  ) Right Fem     (  ) Left Fem    (  ) SPC        (  ) pigtail       (  ) PEG tube       (  ) colostomy       (  ) jejunostomy  (  ) U-Dall    LABS:                        10.7   2.43  )-----------( 51       ( 06 Mar 2024 06:36 )             34.9     03-06    143  |  107  |  21<H>  ----------------------------<  86  3.7   |  25  |  1.0    Ca    8.5      06 Mar 2024 06:36  Mg     2.1     03-06    TPro  7.2  /  Alb  3.7  /  TBili  2.0<H>  /  DBili  x   /  AST  18  /  ALT  17  /  AlkPhos  105  03-06    PT/INR - ( 06 Mar 2024 06:36 )   PT: 14.30 sec;   INR: 1.25 ratio         PTT - ( 06 Mar 2024 06:36 )  PTT:32.4 sec  Urinalysis Basic - ( 06 Mar 2024 06:36 )    Color: x / Appearance: x / SG: x / pH: x  Gluc: 86 mg/dL / Ketone: x  / Bili: x / Urobili: x   Blood: x / Protein: x / Nitrite: x   Leuk Esterase: x / RBC: x / WBC x   Sq Epi: x / Non Sq Epi: x / Bacteria: x        Lactate, Blood: 1.3 mmol/L (03-06-24 @ 06:36)          RADIOLOGY:

## 2024-03-06 NOTE — PROGRESS NOTE ADULT - CONVERSATION DETAILS
I discussed with the patient [facility] and he stated that he has living well with his neice stating DNR /DNI and this is his wishes now as well with NIV trials   I also called the patient Niece and confirmed from her as well

## 2024-03-07 ENCOUNTER — RESULT REVIEW (OUTPATIENT)
Age: 79
End: 2024-03-07

## 2024-03-07 ENCOUNTER — TRANSCRIPTION ENCOUNTER (OUTPATIENT)
Age: 79
End: 2024-03-07

## 2024-03-07 LAB
ALBUMIN SERPL ELPH-MCNC: 3.7 G/DL — SIGNIFICANT CHANGE UP (ref 3.5–5.2)
ALP SERPL-CCNC: 103 U/L — SIGNIFICANT CHANGE UP (ref 30–115)
ALT FLD-CCNC: 14 U/L — SIGNIFICANT CHANGE UP (ref 0–41)
ANION GAP SERPL CALC-SCNC: 12 MMOL/L — SIGNIFICANT CHANGE UP (ref 7–14)
ANION GAP SERPL CALC-SCNC: 14 MMOL/L — SIGNIFICANT CHANGE UP (ref 7–14)
AST SERPL-CCNC: 15 U/L — SIGNIFICANT CHANGE UP (ref 0–41)
BASOPHILS # BLD AUTO: 0.04 K/UL — SIGNIFICANT CHANGE UP (ref 0–0.2)
BASOPHILS NFR BLD AUTO: 1.3 % — HIGH (ref 0–1)
BILIRUB SERPL-MCNC: 1.5 MG/DL — HIGH (ref 0.2–1.2)
BUN SERPL-MCNC: 20 MG/DL — SIGNIFICANT CHANGE UP (ref 10–20)
BUN SERPL-MCNC: 20 MG/DL — SIGNIFICANT CHANGE UP (ref 10–20)
CALCIUM SERPL-MCNC: 8.4 MG/DL — SIGNIFICANT CHANGE UP (ref 8.4–10.5)
CALCIUM SERPL-MCNC: 8.4 MG/DL — SIGNIFICANT CHANGE UP (ref 8.4–10.5)
CHLORIDE SERPL-SCNC: 100 MMOL/L — SIGNIFICANT CHANGE UP (ref 98–110)
CHLORIDE SERPL-SCNC: 101 MMOL/L — SIGNIFICANT CHANGE UP (ref 98–110)
CO2 SERPL-SCNC: 27 MMOL/L — SIGNIFICANT CHANGE UP (ref 17–32)
CO2 SERPL-SCNC: 28 MMOL/L — SIGNIFICANT CHANGE UP (ref 17–32)
CREAT SERPL-MCNC: 0.9 MG/DL — SIGNIFICANT CHANGE UP (ref 0.7–1.5)
CREAT SERPL-MCNC: 0.9 MG/DL — SIGNIFICANT CHANGE UP (ref 0.7–1.5)
DIR ANTIGLOB POLYSPECIFIC INTERPRETATION: ABNORMAL
EGFR: 87 ML/MIN/1.73M2 — SIGNIFICANT CHANGE UP
EGFR: 87 ML/MIN/1.73M2 — SIGNIFICANT CHANGE UP
EOSINOPHIL # BLD AUTO: 0.03 K/UL — SIGNIFICANT CHANGE UP (ref 0–0.7)
EOSINOPHIL NFR BLD AUTO: 1 % — SIGNIFICANT CHANGE UP (ref 0–8)
GLUCOSE SERPL-MCNC: 77 MG/DL — SIGNIFICANT CHANGE UP (ref 70–99)
GLUCOSE SERPL-MCNC: 87 MG/DL — SIGNIFICANT CHANGE UP (ref 70–99)
HAPTOGLOB SERPL-MCNC: 28 MG/DL — LOW (ref 34–200)
HCT VFR BLD CALC: 35.2 % — LOW (ref 42–52)
HGB BLD-MCNC: 11.1 G/DL — LOW (ref 14–18)
IMM GRANULOCYTES NFR BLD AUTO: 1.3 % — HIGH (ref 0.1–0.3)
LYMPHOCYTES # BLD AUTO: 1.1 K/UL — LOW (ref 1.2–3.4)
LYMPHOCYTES # BLD AUTO: 36.1 % — SIGNIFICANT CHANGE UP (ref 20.5–51.1)
MAGNESIUM SERPL-MCNC: 1.9 MG/DL — SIGNIFICANT CHANGE UP (ref 1.8–2.4)
MCHC RBC-ENTMCNC: 27.8 PG — SIGNIFICANT CHANGE UP (ref 27–31)
MCHC RBC-ENTMCNC: 31.5 G/DL — LOW (ref 32–37)
MCV RBC AUTO: 88 FL — SIGNIFICANT CHANGE UP (ref 80–94)
MONOCYTES # BLD AUTO: 0.49 K/UL — SIGNIFICANT CHANGE UP (ref 0.1–0.6)
MONOCYTES NFR BLD AUTO: 16.1 % — HIGH (ref 1.7–9.3)
NEUTROPHILS # BLD AUTO: 1.35 K/UL — LOW (ref 1.4–6.5)
NEUTROPHILS NFR BLD AUTO: 44.2 % — SIGNIFICANT CHANGE UP (ref 42.2–75.2)
NRBC # BLD: 3 /100 WBCS — HIGH (ref 0–0)
PLATELET # BLD AUTO: 42 K/UL — LOW (ref 130–400)
PMV BLD: 11.2 FL — HIGH (ref 7.4–10.4)
POTASSIUM SERPL-MCNC: 2.6 MMOL/L — CRITICAL LOW (ref 3.5–5)
POTASSIUM SERPL-MCNC: 3.5 MMOL/L — SIGNIFICANT CHANGE UP (ref 3.5–5)
POTASSIUM SERPL-SCNC: 2.6 MMOL/L — CRITICAL LOW (ref 3.5–5)
POTASSIUM SERPL-SCNC: 3.5 MMOL/L — SIGNIFICANT CHANGE UP (ref 3.5–5)
PROT SERPL-MCNC: 7 G/DL — SIGNIFICANT CHANGE UP (ref 6–8)
RBC # BLD: 4 M/UL — LOW (ref 4.7–6.1)
RBC # FLD: 20.2 % — HIGH (ref 11.5–14.5)
SODIUM SERPL-SCNC: 140 MMOL/L — SIGNIFICANT CHANGE UP (ref 135–146)
SODIUM SERPL-SCNC: 142 MMOL/L — SIGNIFICANT CHANGE UP (ref 135–146)
WBC # BLD: 3.05 K/UL — LOW (ref 4.8–10.8)
WBC # FLD AUTO: 3.05 K/UL — LOW (ref 4.8–10.8)

## 2024-03-07 PROCEDURE — 76705 ECHO EXAM OF ABDOMEN: CPT | Mod: 26

## 2024-03-07 PROCEDURE — 88189 FLOWCYTOMETRY/READ 16 & >: CPT

## 2024-03-07 PROCEDURE — 99232 SBSQ HOSP IP/OBS MODERATE 35: CPT

## 2024-03-07 PROCEDURE — 93306 TTE W/DOPPLER COMPLETE: CPT | Mod: 26

## 2024-03-07 PROCEDURE — 88291 CYTO/MOLECULAR REPORT: CPT

## 2024-03-07 RX ORDER — POTASSIUM CHLORIDE 20 MEQ
20 PACKET (EA) ORAL
Refills: 0 | Status: DISCONTINUED | OUTPATIENT
Start: 2024-03-07 | End: 2024-03-11

## 2024-03-07 RX ORDER — POTASSIUM CHLORIDE 20 MEQ
20 PACKET (EA) ORAL ONCE
Refills: 0 | Status: COMPLETED | OUTPATIENT
Start: 2024-03-07 | End: 2024-03-07

## 2024-03-07 RX ORDER — POTASSIUM CHLORIDE 20 MEQ
20 PACKET (EA) ORAL
Refills: 0 | Status: COMPLETED | OUTPATIENT
Start: 2024-03-07 | End: 2024-03-08

## 2024-03-07 RX ADMIN — Medication 20 MILLIEQUIVALENT(S): at 23:29

## 2024-03-07 RX ADMIN — Medication 40 MILLIGRAM(S): at 05:28

## 2024-03-07 RX ADMIN — Medication 20 MILLIEQUIVALENT(S): at 22:12

## 2024-03-07 RX ADMIN — Medication 50 MILLIEQUIVALENT(S): at 12:20

## 2024-03-07 RX ADMIN — Medication 40 MILLIGRAM(S): at 18:08

## 2024-03-07 RX ADMIN — Medication 50 MILLIEQUIVALENT(S): at 16:02

## 2024-03-07 NOTE — DISCHARGE NOTE NURSING/CASE MANAGEMENT/SOCIAL WORK - NSDCFUADDAPPT_GEN_ALL_CORE_FT
Vignesh Diaz's office tel # [phone] to made STAR appointment.  As per Brooklynn, appointment is March 28th at 2 pm.  stated someone from the office will contact the patient tomorrow to see if an earlier appointment can be made.

## 2024-03-07 NOTE — CONSULT NOTE ADULT - ASSESSMENT
78 yo with no PMH presents for eval of new onset shortness of breath for the past few days. Patient reported that he started feeling SOB for the past few days at rest and on exertion and it was progressively worsening where he couldn't sleep for the past 2 nights bcz of orthopnea. He reported associated bilateral ankle swelling. Denies chest pain, palpitations, fever, chills, abdominal pain. This is the first time he experiences such sxs. He is usually an active man, never felt SOB or chest pain on exertion or climbing up the stairs. No known sick contacts, no recent travel, ex heavy smoker ( 1 pack per day > 30 yrs, stopped 8 yrs ago ), no alcohol use, no recent weight loss or night sweats. He did not follow u with a PCP for years  Patient admitted for workup of new onset CHF and pancytopenia  (05 Mar 2024 20:14)  Cardiology consulted for CHF with EF 25% seen on TTE. patient feeling much better today with major improvement in his SOB     # Impression:  - New onset HFrEF (EF 25%) with global hypokinesis   - Mod MR  - pancytopenia    # Recs:   - keep on telemetry   - looks euvolemic on exam. no IV diuresis   - can start metoprolol tartrate 12,5 mg PO BID + losartan 25 mg po od in case SBP > 120  - will consider ischemic work up after assessment by hematology team   - need to check if patient can be on AC and DAPT for at least 1 year in case a stent is placed  78 yo with no PMH presents for eval of new onset shortness of breath for the past few days. Patient reported that he started feeling SOB for the past few days at rest and on exertion and it was progressively worsening where he couldn't sleep for the past 2 nights bcz of orthopnea. He reported associated bilateral ankle swelling. Denies chest pain, palpitations, fever, chills, abdominal pain. This is the first time he experiences such sxs. He is usually an active man, never felt SOB or chest pain on exertion or climbing up the stairs. No known sick contacts, no recent travel, ex heavy smoker ( 1 pack per day > 30 yrs, stopped 8 yrs ago ), no alcohol use, no recent weight loss or night sweats. He did not follow u with a PCP for years  Patient admitted for workup of new onset CHF and pancytopenia  (05 Mar 2024 20:14)  Cardiology consulted for CHF with EF 25% seen on TTE. patient feeling much better today with major improvement in his SOB     # Impression:  - New onset HFrEF (EF 25%) with global hypokinesis   - Mod MR  - pancytopenia    # Recs:   - keep on telemetry   - looks euvolemic on exam. no IV diuresis   - can start metoprolol tartrate 12,5 mg PO BID + losartan 25 mg po od in case SBP > 120  - need a repeat TTE in 3 months after appropriate GDMT therapy   - CS EP for WCD on DC   - will consider ischemic work up after assessment and clearance by hematology team   - HF follow up as OP   80 yo with no PMH presents for eval of new onset shortness of breath for the past few days. Patient reported that he started feeling SOB for the past few days at rest and on exertion and it was progressively worsening where he couldn't sleep for the past 2 nights bcz of orthopnea. He reported associated bilateral ankle swelling. Denies chest pain, palpitations, fever, chills, abdominal pain. This is the first time he experiences such sxs. He is usually an active man, never felt SOB or chest pain on exertion or climbing up the stairs. No known sick contacts, no recent travel, ex heavy smoker ( 1 pack per day > 30 yrs, stopped 8 yrs ago ), no alcohol use, no recent weight loss or night sweats. He did not follow u with a PCP for years  Patient admitted for workup of new onset CHF and pancytopenia  (05 Mar 2024 20:14)  Cardiology consulted for CHF with EF 25% seen on TTE. patient feeling much better today with major improvement in his SOB     # Impression:  - New onset HFrEF (EF 25%) with global hypokinesis   - Mod MR  - pancytopenia    # Recs:   - keep on telemetry   - no significant volume overload on physical exam however BARRIENTOS, can start lasix 40 mg daily for now and reassess clinically .   - can start metoprolol tartrate 12,5 mg PO BID + losartan 25 mg po od (BP borderline ) will titrate up dose according to BP   -given significant thrombocytopenia at this point, patient is not candidate for invasive cardiac workup such cardiac cath . consider NST , will treat with GDMT for now  - need a repeat TTE in 3 months after appropriate GDMT therapy   - CS EP for WCD on DC   - will consider ischemic work up once platelets improves and post hematology evaluation   - HF follow up as OP

## 2024-03-07 NOTE — DISCHARGE NOTE NURSING/CASE MANAGEMENT/SOCIAL WORK - NSDCPEFALRISK_GEN_ALL_CORE
For information on Fall & Injury Prevention, visit: [email or web address] OR  [email or web address] OR  https://www.cdc.gov/steadi/patient.html

## 2024-03-07 NOTE — PROGRESS NOTE ADULT - SUBJECTIVE AND OBJECTIVE BOX
24H events:    Patient is a 79y old Male who presents with a chief complaint of shortness of breath (06 Mar 2024 14:46)    Primary diagnosis of SOB (shortness of breath)      Day 1:  Day 2:  Day 3:     Today is hospital day 2d. This morning patient was seen and examined at bedside, resting comfortably in bed.    No acute or major events overnight.    Code Status:    Family communication:  Contact date:  Name of person contacted:  Relationship to patient:  Communication details:  What matters most:    PAST MEDICAL & SURGICAL HISTORY  No pertinent past medical history    No significant past surgical history      SOCIAL HISTORY:  Social History:      ALLERGIES:  No Known Allergies    MEDICATIONS:  STANDING MEDICATIONS  enoxaparin Injectable 40 milliGRAM(s) SubCutaneous every 24 hours  furosemide   Injectable 40 milliGRAM(s) IV Push every 12 hours  influenza  Vaccine (HIGH DOSE) 0.7 milliLiter(s) IntraMuscular once  potassium chloride  20 mEq/100 mL IVPB 20 milliEquivalent(s) IV Intermittent every 2 hours    PRN MEDICATIONS    VITALS:   T(F): 97.5  HR: 73  BP: 104/63  RR: 18  SpO2: --    PHYSICAL EXAM:  GENERAL:   ( x ) NAD, lying in bed comfortably     (  ) obtunded     (  ) lethargic     (  ) somnolent    HEAD:   ( x ) Atraumatic     (  ) hematoma     (  ) laceration (specify location:       )     NECK:  (  ) Supple     (  ) neck stiffness     (  ) nuchal rigidity     (  )  no JVD     (  ) JVD present ( -- cm)    HEART:  Rate -->     ( x ) normal rate     (  ) bradycardic     (  ) tachycardic  Rhythm -->     ( x ) regular     (  ) regularly irregular     (  ) irregularly irregular  Murmurs -->     ( x ) normal s1s2     (  ) systolic murmur     (  ) diastolic murmur     (  ) continuous murmur      (  ) S3 present     (  ) S4 present    LUNGS:   ( x )Unlabored respirations     (  ) tachypnea  ( x ) B/L air entry     (  ) decreased breath sounds in:  (location     )    (  ) no adventitious sound     (  ) crackles     (  ) wheezing      (  ) rhonchi      (specify location:       )  (  ) chest wall tenderness (specify location:       )    ABDOMEN:   ( x ) Soft     (  ) tense   |   (  ) nondistended     (  ) distended   |   (  ) +BS     (  ) hypoactive bowel sounds     (  ) hyperactive bowel sounds  (  ) nontender     (  ) RUQ tenderness     (  ) RLQ tenderness     (  ) LLQ tenderness     (  ) epigastric tenderness     (  ) diffuse tenderness  (  ) Splenomegaly      (  ) Hepatomegaly      (  ) Jaundice     (  ) ecchymosis     EXTREMITIES:  ( x ) Normal     (  ) Rash     (  ) ecchymosis     (  ) varicose veins      (  ) pitting edema     (  ) non-pitting edema   (  ) ulceration     (  ) gangrene:     (location:     )    NERVOUS SYSTEM:    (  ) A&Ox3     (  ) confused     (  ) lethargic  CN II-XII:     (  ) Intact     (  ) deficits found     (Specify:     )   Upper extremities:     (  ) no sensorimotor deficits     (  ) weakness     (  ) loss of proprioception/vibration     (  ) loss of touch/temperature (specify:    )  Lower extremities:     (  ) no sensorimotor deficits     (  ) weakness     (  ) loss of proprioception/vibration     (  ) loss of touch/temperature (specify:    )    (  ) Indwelling Tadeo Catheter:   Date insterted:    Reason (  ) Critical illness     (  ) urinary retention    (  ) Accurate Ins/Outs Monitoring     (  ) CMO patient    (  ) Central Line:   Date inserted:  Location: (  ) Right IJ     (  ) Left IJ     (  ) Right Fem     (  ) Left Fem    (  ) SPC        (  ) pigtail       (  ) PEG tube       (  ) colostomy       (  ) jejunostomy  (  ) U-Dall    LABS:                        11.1   3.05  )-----------( 42       ( 07 Mar 2024 07:17 )             35.2     03-07    142  |  101  |  20  ----------------------------<  87  2.6<LL>   |  27  |  0.9    Ca    8.4      07 Mar 2024 07:17  Mg     1.9     03-07    TPro  7.0  /  Alb  3.7  /  TBili  1.5<H>  /  DBili  x   /  AST  15  /  ALT  14  /  AlkPhos  103  03-07    PT/INR - ( 06 Mar 2024 06:36 )   PT: 14.30 sec;   INR: 1.25 ratio         PTT - ( 06 Mar 2024 06:36 )  PTT:32.4 sec  Urinalysis Basic - ( 07 Mar 2024 07:17 )    Color: x / Appearance: x / SG: x / pH: x  Gluc: 87 mg/dL / Ketone: x  / Bili: x / Urobili: x   Blood: x / Protein: x / Nitrite: x   Leuk Esterase: x / RBC: x / WBC x   Sq Epi: x / Non Sq Epi: x / Bacteria: x                RADIOLOGY:

## 2024-03-07 NOTE — PROGRESS NOTE ADULT - ASSESSMENT
80 yo with no PMH presents for eval of new onset shortness of breath for the past few days. Patient reported that he started feeling SOB for the past few days at rest and on exertion and it was progressively worsening where he couldn't sleep for the past 2 nights bcz of orthopnea. He reported associated bilateral ankle swelling.    #Shortness of breath 2/2 to volume overload   #R/O new onset CHF   - hemodynamically stable   - spo2 98% on 3.5 L NC   - trop 42 --. 42, pro-BN [id number]  - VBG: lactate 2.8, pH 7.35, pCO2 43    - EKG: sinus with PVCs, LVH   - CXR bilateral basilar opacities, hilar congestion, increased vascular markings    - c/w lasix 40 mg IV BID   - strict I&O  - f/u on echo  - monitor oxygen requirements   - f/u covid/flu/RSV   - keep K > 4 and Mg > 2   - once euvolemic and respiratory status improves needs cardio eval for   ischemic workup ( r/o ischemic heart disease as cause of new onset HF ) - will assess after echo  - monitor BP, consider starting ACE/ARB if BP tolerates     #Pancytopenia   - WBC 2.92, Hb 11.6, MCV 88.3, Plt 63, ANC 1250   - no B symptoms, no hx of alcohol use   - f/u retic count, LDH, iron studies, B12, folate, dino test, TSH   HIV, hep B and C profile, INR   - f/u RUQ US to r/o liver disease as cause of pancytopenia   - Heme/onc consult recs appreciated   - platelets dropping - stopped lovenox    #DVT ppx: lovenox - stopped due to platelets dropping  #GI ppx: NA   #Diet: DASH   #Activity: as tolerated

## 2024-03-07 NOTE — CONSULT NOTE ADULT - SUBJECTIVE AND OBJECTIVE BOX
HPI:  80 yo with no PMH presents for eval of new onset shortness of breath for the past few days. Patient reported that he started feeling SOB for the past few days at rest and on exertion and it was progressively worsening where he couldn't sleep for the past 2 nights bcz of orthopnea. He reported associated bilateral ankle swelling. Denies chest pain, palpitations, fever, chills, abdominal pain. This is the first time he experiences such sxs. He is usually an active man, never felt SOB or chest pain on exertion or climbing up the stairs. No known sick contacts, no recent travel, ex heavy smoker ( 1 pack per day > 30 yrs, stopped 8 yrs ago ), no alcohol use, no recent weight loss or night sweats. He did not follow u with a PCP for years.     Vitals: T 97.6, HR 75, /76, spo2 98% on 3.5 L NC   Labs: WBC 2.92, Hb 11.6, MCV 88.3, Plt 63, ANC 1250   trop 42 --. 42, K 3.4, Tb 1.8, pro-BN [id number]  VBG: lactate 2.8, pH 7.35, pCO2 43    EKG: sinus with PVCs, LVH   CXR bilateral basilar opacities, hilar congestion, increased vascular markings     s/p lasix 40 mg IV once in ED     Patient admitted for workup of new onset CHF and pancytopenia  (05 Mar 2024 20:14)    Cardiology consulted for CHF with EF 25% seen on TTE. patient feeling much better today with major improvement in his SOB     PAST MEDICAL & SURGICAL HISTORY  No pertinent past medical history    No significant past surgical history        FAMILY HISTORY:  FAMILY HISTORY:  No pertinent family history in first degree relatives        SOCIAL HISTORY:  []smoker  []Alcohol  []Drug    ALLERGIES:  No Known Allergies      MEDICATIONS:  MEDICATIONS  (STANDING):  influenza  Vaccine (HIGH DOSE) 0.7 milliLiter(s) IntraMuscular once  potassium chloride    Tablet ER 20 milliEquivalent(s) Oral every 2 hours  potassium chloride  20 mEq/100 mL IVPB 20 milliEquivalent(s) IV Intermittent every 2 hours    MEDICATIONS  (PRN):      HOME MEDICATIONS:  Home Medications:      VITALS:   T(F): 97.1 (03-07 @ 20:14), Max: 97.6 (03-05 @ 14:28)  HR: 83 (03-07 @ 20:14) (73 - 98)  BP: 157/64 (03-07 @ 20:14) (98/58 - 157/64)  BP(mean): --  RR: 18 (03-07 @ 20:14) (17 - 18)  SpO2: 98% (03-05 @ 19:20) (95% - 98%)    I&O's Summary    06 Mar 2024 07:01  -  07 Mar 2024 07:00  --------------------------------------------------------  IN: 2400 mL / OUT: 2000 mL / NET: 400 mL    07 Mar 2024 07:01  -  07 Mar 2024 21:29  --------------------------------------------------------  IN: 1660 mL / OUT: 2425 mL / NET: -765 mL        REVIEW OF SYSTEMS:  CONSTITUTIONAL: No weakness, fevers or chills  EYES: No visual changes  ENT: No vertigo or throat pain   NECK: No pain or stiffness  RESPIRATORY: No cough, wheezing, hemoptysis; No shortness of breath  CARDIOVASCULAR: No chest pain or palpitations  GASTROINTESTINAL: No abdominal or epigastric pain. No nausea, vomiting, or hematemesis; No diarrhea or constipation. No melena or hematochezia.  GENITOURINARY: No dysuria, frequency or hematuria  NEUROLOGICAL: No numbness or weakness  SKIN: No itching, no rashes  MSK: No pain    PHYSICAL EXAM:  NEURO: patient is awake , alert and oriented  GEN: Not in acute distress  NECK: no thyroid enlargement, no JVD  LUNGS: Clear to auscultation bilaterally   CARDIOVASCULAR: S1/S2 present, RRR , no murmurs or rubs, no carotid bruits,  + PP bilaterally  ABD: Soft, non-tender, non-distended, +BS  EXT: No CECILIA  SKIN: Intact    LABS:                        11.1   3.05  )-----------( 42       ( 07 Mar 2024 07:17 )             35.2     03-07    140  |  100  |  20  ----------------------------<  77  3.5   |  28  |  0.9    Ca    8.4      07 Mar 2024 17:36  Mg     1.9     03-07    TPro  7.0  /  Alb  3.7  /  TBili  1.5<H>  /  DBili  x   /  AST  15  /  ALT  14  /  AlkPhos  103  03-07    PT/INR - ( 06 Mar 2024 06:36 )   PT: 14.30 sec;   INR: 1.25 ratio         PTT - ( 06 Mar 2024 06:36 )  PTT:32.4 sec          Troponin trend:      03-06 Chol 99 LDL -- HDL 20<L> Trig 108      RADIOLOGY:  -CXR:  Worsening opacifications, CHF    -TTE:   1. Left ventricular ejection fraction, by visual estimation, is 25 to   30%.   2. Severely decreased global left ventricular systolic function.   3. Mildly enlarged left atrium.   4. Normal right atrial size.   5. Moderate mitral valve regurgitation.   6. Thickening of the anterior and posterior mitral valve leaflets.   7. Mild tricuspid regurgitation.   8. Mild aortic regurgitation.   9. Sclerotic aortic valve with normal opening.  10. Dilatation of the ascending aorta    -CCTA:  -STRESS TEST:  -CATHETERIZATION:    ECG: NSR, pvc, RAD and possible LVH    TELEMETRY EVENTS:

## 2024-03-07 NOTE — DISCHARGE NOTE NURSING/CASE MANAGEMENT/SOCIAL WORK - PATIENT PORTAL LINK FT
You can access the FollowMyHealth Patient Portal offered by [facility] by registering at the following website: [email or web address]. By joining [employer]’s FollowMyHealth portal, you will also be able to view your health information using other applications (apps) compatible with our system.

## 2024-03-07 NOTE — PROGRESS NOTE ADULT - ATTENDING COMMENTS
My note supersedes all residents notes that I sign, My correction for their notes are in my notes   the patient stated that he feels fine    On exam  General: awake, alert, NAD, chronic ill appearance  Lungs:  clear to ausculations b/l, normal resp effort  Heart: regular ryhthm   Abdomen: soft, non tender non distended  Ext: no edema, can move all  his extremities     80 yo M with no significant PMH presents to ED for worsening BARRIENTOS.     Acute hypoxic respiratory failure 2/2 pulmonary edema 2/2 suspected new onset CHF  - CXR Bibasal pleural-parenchymal opacities.  CXR today Worsening opacifications, CHF.  Pt symptoms improving   - proBNP [id number]  - s/p IV lasix 40mg x 1 given in ED.   IV lasix 40mg BID held until K is better , give K and get BMP at 8 pm  , monitor electrolytes and I/O and daily weight   - monitor daily weight, strict I & O, Low Na diet with fluid restriction.   - monitor on Telemetry.   troponin 42--> 42--> 48   no active chest pain and the sob improved   Echo Left ventricular ejection fraction, by visual estimation, is25 to 30%.   Dilatation of the ascending aorta.  consult cardio for new onset Heart Failure with Reduced Ejection fraction     Pancytopenia  - f/u anemia work up  RUQ sono with No definite liver cirrhosis. Liver echogenic foci measuring up to 2 cm, likely hemangiomas.  Hematolgoy consult appreciated[[- check MMA level  - recommend US spleen to assess for splenomegaly  - MRI liver to further assess for underlying cirrhosis given nonspecific US liver   - please send flow cytometry, will fill Requisition form and place in chart  - follow up haptoglobin  - no need to steroids at this time for + dino test]]        DVT ppx: Lovenox SC held for thrombocytopenia,  monitor platelet.     pending; cardiology, hematology, K level and platelet

## 2024-03-08 LAB
ALBUMIN SERPL ELPH-MCNC: 3.8 G/DL — SIGNIFICANT CHANGE UP (ref 3.5–5.2)
ALP SERPL-CCNC: 105 U/L — SIGNIFICANT CHANGE UP (ref 30–115)
ALT FLD-CCNC: 14 U/L — SIGNIFICANT CHANGE UP (ref 0–41)
ANION GAP SERPL CALC-SCNC: 11 MMOL/L — SIGNIFICANT CHANGE UP (ref 7–14)
ANION GAP SERPL CALC-SCNC: 9 MMOL/L — SIGNIFICANT CHANGE UP (ref 7–14)
AST SERPL-CCNC: 14 U/L — SIGNIFICANT CHANGE UP (ref 0–41)
BASOPHILS # BLD AUTO: 0.03 K/UL — SIGNIFICANT CHANGE UP (ref 0–0.2)
BASOPHILS NFR BLD AUTO: 0.8 % — SIGNIFICANT CHANGE UP (ref 0–1)
BILIRUB SERPL-MCNC: 1.1 MG/DL — SIGNIFICANT CHANGE UP (ref 0.2–1.2)
BUN SERPL-MCNC: 18 MG/DL — SIGNIFICANT CHANGE UP (ref 10–20)
BUN SERPL-MCNC: 21 MG/DL — HIGH (ref 10–20)
CALCIUM SERPL-MCNC: 8.3 MG/DL — LOW (ref 8.4–10.5)
CALCIUM SERPL-MCNC: 8.9 MG/DL — SIGNIFICANT CHANGE UP (ref 8.4–10.5)
CHLORIDE SERPL-SCNC: 102 MMOL/L — SIGNIFICANT CHANGE UP (ref 98–110)
CHLORIDE SERPL-SCNC: 102 MMOL/L — SIGNIFICANT CHANGE UP (ref 98–110)
CO2 SERPL-SCNC: 28 MMOL/L — SIGNIFICANT CHANGE UP (ref 17–32)
CO2 SERPL-SCNC: 29 MMOL/L — SIGNIFICANT CHANGE UP (ref 17–32)
CREAT SERPL-MCNC: 0.9 MG/DL — SIGNIFICANT CHANGE UP (ref 0.7–1.5)
CREAT SERPL-MCNC: 1.1 MG/DL — SIGNIFICANT CHANGE UP (ref 0.7–1.5)
EGFR: 68 ML/MIN/1.73M2 — SIGNIFICANT CHANGE UP
EGFR: 87 ML/MIN/1.73M2 — SIGNIFICANT CHANGE UP
EOSINOPHIL # BLD AUTO: 0.04 K/UL — SIGNIFICANT CHANGE UP (ref 0–0.7)
EOSINOPHIL NFR BLD AUTO: 1 % — SIGNIFICANT CHANGE UP (ref 0–8)
GLUCOSE SERPL-MCNC: 71 MG/DL — SIGNIFICANT CHANGE UP (ref 70–99)
GLUCOSE SERPL-MCNC: 91 MG/DL — SIGNIFICANT CHANGE UP (ref 70–99)
HCT VFR BLD CALC: 36.8 % — LOW (ref 42–52)
HGB BLD-MCNC: 11.5 G/DL — LOW (ref 14–18)
HIV 1+2 AB+HIV1 P24 AG SERPL QL IA: SIGNIFICANT CHANGE UP
IMM GRANULOCYTES NFR BLD AUTO: 0.5 % — HIGH (ref 0.1–0.3)
LYMPHOCYTES # BLD AUTO: 1.59 K/UL — SIGNIFICANT CHANGE UP (ref 1.2–3.4)
LYMPHOCYTES # BLD AUTO: 40.7 % — SIGNIFICANT CHANGE UP (ref 20.5–51.1)
MAGNESIUM SERPL-MCNC: 2.2 MG/DL — SIGNIFICANT CHANGE UP (ref 1.8–2.4)
MCHC RBC-ENTMCNC: 27.3 PG — SIGNIFICANT CHANGE UP (ref 27–31)
MCHC RBC-ENTMCNC: 31.3 G/DL — LOW (ref 32–37)
MCV RBC AUTO: 87.4 FL — SIGNIFICANT CHANGE UP (ref 80–94)
MONOCYTES # BLD AUTO: 0.63 K/UL — HIGH (ref 0.1–0.6)
MONOCYTES NFR BLD AUTO: 16.1 % — HIGH (ref 1.7–9.3)
NEUTROPHILS # BLD AUTO: 1.6 K/UL — SIGNIFICANT CHANGE UP (ref 1.4–6.5)
NEUTROPHILS NFR BLD AUTO: 40.9 % — LOW (ref 42.2–75.2)
NRBC # BLD: 2 /100 WBCS — HIGH (ref 0–0)
PLATELET # BLD AUTO: 46 K/UL — LOW (ref 130–400)
PMV BLD: 11.6 FL — HIGH (ref 7.4–10.4)
POTASSIUM SERPL-MCNC: 3.2 MMOL/L — LOW (ref 3.5–5)
POTASSIUM SERPL-MCNC: 3.7 MMOL/L — SIGNIFICANT CHANGE UP (ref 3.5–5)
POTASSIUM SERPL-SCNC: 3.2 MMOL/L — LOW (ref 3.5–5)
POTASSIUM SERPL-SCNC: 3.7 MMOL/L — SIGNIFICANT CHANGE UP (ref 3.5–5)
PROT SERPL-MCNC: 7.3 G/DL — SIGNIFICANT CHANGE UP (ref 6–8)
RBC # BLD: 4.21 M/UL — LOW (ref 4.7–6.1)
RBC # FLD: 20 % — HIGH (ref 11.5–14.5)
SODIUM SERPL-SCNC: 140 MMOL/L — SIGNIFICANT CHANGE UP (ref 135–146)
SODIUM SERPL-SCNC: 141 MMOL/L — SIGNIFICANT CHANGE UP (ref 135–146)
WBC # BLD: 3.91 K/UL — LOW (ref 4.8–10.8)
WBC # FLD AUTO: 3.91 K/UL — LOW (ref 4.8–10.8)

## 2024-03-08 PROCEDURE — 99232 SBSQ HOSP IP/OBS MODERATE 35: CPT

## 2024-03-08 PROCEDURE — 99222 1ST HOSP IP/OBS MODERATE 55: CPT

## 2024-03-08 PROCEDURE — 99223 1ST HOSP IP/OBS HIGH 75: CPT

## 2024-03-08 PROCEDURE — 71260 CT THORAX DX C+: CPT | Mod: 26

## 2024-03-08 PROCEDURE — 74177 CT ABD & PELVIS W/CONTRAST: CPT | Mod: 26

## 2024-03-08 RX ORDER — HEPARIN SODIUM 5000 [USP'U]/ML
5000 INJECTION INTRAVENOUS; SUBCUTANEOUS EVERY 12 HOURS
Refills: 0 | Status: DISCONTINUED | OUTPATIENT
Start: 2024-03-08 | End: 2024-03-08

## 2024-03-08 RX ORDER — POTASSIUM CHLORIDE 20 MEQ
20 PACKET (EA) ORAL ONCE
Refills: 0 | Status: COMPLETED | OUTPATIENT
Start: 2024-03-08 | End: 2024-03-08

## 2024-03-08 RX ORDER — SACUBITRIL AND VALSARTAN 24; 26 MG/1; MG/1
1 TABLET, FILM COATED ORAL
Refills: 0 | Status: DISCONTINUED | OUTPATIENT
Start: 2024-03-08 | End: 2024-03-08

## 2024-03-08 RX ORDER — LOSARTAN POTASSIUM 100 MG/1
25 TABLET, FILM COATED ORAL DAILY
Refills: 0 | Status: DISCONTINUED | OUTPATIENT
Start: 2024-03-08 | End: 2024-03-11

## 2024-03-08 RX ORDER — IOHEXOL 300 MG/ML
30 INJECTION, SOLUTION INTRAVENOUS ONCE
Refills: 0 | Status: COMPLETED | OUTPATIENT
Start: 2024-03-08 | End: 2024-03-08

## 2024-03-08 RX ORDER — METOPROLOL TARTRATE 50 MG
25 TABLET ORAL DAILY
Refills: 0 | Status: DISCONTINUED | OUTPATIENT
Start: 2024-03-08 | End: 2024-03-15

## 2024-03-08 RX ORDER — FUROSEMIDE 40 MG
20 TABLET ORAL DAILY
Refills: 0 | Status: DISCONTINUED | OUTPATIENT
Start: 2024-03-08 | End: 2024-03-10

## 2024-03-08 RX ADMIN — Medication 25 MILLIGRAM(S): at 11:28

## 2024-03-08 RX ADMIN — IOHEXOL 30 MILLILITER(S): 300 INJECTION, SOLUTION INTRAVENOUS at 14:43

## 2024-03-08 RX ADMIN — Medication 20 MILLIEQUIVALENT(S): at 02:13

## 2024-03-08 RX ADMIN — LOSARTAN POTASSIUM 25 MILLIGRAM(S): 100 TABLET, FILM COATED ORAL at 18:23

## 2024-03-08 RX ADMIN — Medication 20 MILLIGRAM(S): at 12:29

## 2024-03-08 RX ADMIN — Medication 50 MILLIEQUIVALENT(S): at 11:28

## 2024-03-08 NOTE — PROGRESS NOTE ADULT - ASSESSMENT
78 yo with no PMH presents for eval of new onset shortness of breath for the past few days. Patient reported that he started feeling SOB for the past few days at rest and on exertion and it was progressively worsening where he couldn't sleep for the past 2 nights bcz of orthopnea. He reported associated bilateral ankle swelling.      #Pancytopenia  - no prior blood work for review  - no hx of pancytopenia, malignancy, or hematological disorder per patient  - not taking any medications, denies alcohol and drug use  - Hemoglobin: 10.7 g/dL (03.06.24 @ 06:36)  - Mean Cell Volume: 89.7 fL (03.06.24 @ 06:36)  - Platelet Count - Automated: 51 K/uL (03.06.24 @ 06:36)  - WBC Count: 2.43 K/uL (03.06.24 @ 06:36)  - Auto Neutrophil #: 1.17 K/uL (03.06.24 @ 06:36)  - US liver no definite evidence of cirrhosis   - B12 and folate level noted   - iron studies WNL   - peripheral smear: rare schistocytes giant platelets/platelet clumping, no spherocytosis,   - being worked up by cardiology for HF with reduced ejection fraction 25%     Recommendations:   - follow up MMA level  - can start patient on oral Vitamin B12   - follow up CT Chest/Abdomen/Pelvis to look for lymphadenopathy, concern for possible lymphoproliferative disorder  - follow up flow cytometry   - repeat dino positive but given Hb is stable and very rare schistocytosis seen on smear, no need to treat hemolytic anemia at this time  - if above work up is non-revealing, will need inpatient bone marrow biopsy   - monitor daily cbc w/ differential  - please repeat Dino test

## 2024-03-08 NOTE — CONSULT NOTE ADULT - ASSESSMENT
80 yo with no PMH presents for eval of new onset shortness of breath for the past few days.  Found to have a new low EF of 25-30%.  Plts in the 40s so invasive ischemic work up not able to be done at this time    Plan:  Pt is being worked up by hematology  Followed by cardiology.   Pt will need ischemic work up at some point  Cont GDMT  Cont tele  Will arrange for WCD on discharge

## 2024-03-08 NOTE — PROGRESS NOTE ADULT - SUBJECTIVE AND OBJECTIVE BOX
24H events:    Patient is a 79y old Male who presents with a chief complaint of shortness of breath (07 Mar 2024 21:28)    Primary diagnosis of SOB (shortness of breath)    Today is hospital day 3d. This morning patient was seen and examined at bedside, resting comfortably in bed.    No acute or major events overnight.      PAST MEDICAL & SURGICAL HISTORY  No pertinent past medical history    No significant past surgical history      SOCIAL HISTORY:  Social History:      ALLERGIES:  No Known Allergies    MEDICATIONS:  STANDING MEDICATIONS  influenza  Vaccine (HIGH DOSE) 0.7 milliLiter(s) IntraMuscular once  metoprolol succinate ER 25 milliGRAM(s) Oral daily  potassium chloride  20 mEq/100 mL IVPB 20 milliEquivalent(s) IV Intermittent every 2 hours  potassium chloride  20 mEq/100 mL IVPB 20 milliEquivalent(s) IV Intermittent once  sacubitril 24 mG/valsartan 26 mG 1 Tablet(s) Oral two times a day    PRN MEDICATIONS    VITALS:   T(F): 96.8  HR: 79  BP: 115/62  RR: 18  SpO2: 98%    PHYSICAL EXAM:  GENERAL:   ( x ) NAD, lying in bed comfortably     (  ) obtunded     (  ) lethargic     (  ) somnolent    HEAD:   ( x ) Atraumatic     (  ) hematoma     (  ) laceration (specify location:       )     HEART:  Rate -->     ( x ) normal rate     (  ) bradycardic     (  ) tachycardic  Rhythm -->     ( x ) regular     (  ) regularly irregular     (  ) irregularly irregular  Murmurs -->     ( x ) normal s1s2     (  ) systolic murmur     (  ) diastolic murmur     (  ) continuous murmur      (  ) S3 present     (  ) S4 present    LUNGS:   ( x )Unlabored respirations     (  ) tachypnea  ( x ) B/L air entry     (  ) decreased breath sounds in:  (location     )    (  ) no adventitious sound     (  ) crackles     (  ) wheezing      (  ) rhonchi      (specify location:       )  (  ) chest wall tenderness (specify location:       )    ABDOMEN:   ( x ) Soft     (  ) tense   |   ( x ) nondistended     (  ) distended   |   (  ) +BS     (  ) hypoactive bowel sounds     (  ) hyperactive bowel sounds  (  ) nontender     (  ) RUQ tenderness     (  ) RLQ tenderness     (  ) LLQ tenderness     (  ) epigastric tenderness     (  ) diffuse tenderness  (  ) Splenomegaly      (  ) Hepatomegaly      (  ) Jaundice     (  ) ecchymosis     EXTREMITIES:  ( x ) Normal     (  ) Rash     (  ) ecchymosis     (  ) varicose veins      (  ) pitting edema     (  ) non-pitting edema   (  ) ulceration     (  ) gangrene:     (location:     )    NERVOUS SYSTEM:    ( x ) A&Ox3     (  ) confused     (  ) lethargic  CN II-XII:     (  ) Intact     (  ) deficits found     (Specify:     )   Upper extremities:     (  ) no sensorimotor deficits     (  ) weakness     (  ) loss of proprioception/vibration     (  ) loss of touch/temperature (specify:    )  Lower extremities:     (  ) no sensorimotor deficits     (  ) weakness     (  ) loss of proprioception/vibration     (  ) loss of touch/temperature (specify:    )    (  ) Indwelling Tadeo Catheter:   Date insterted:    Reason (  ) Critical illness     (  ) urinary retention    (  ) Accurate Ins/Outs Monitoring     (  ) CMO patient    (  ) Central Line:   Date inserted:  Location: (  ) Right IJ     (  ) Left IJ     (  ) Right Fem     (  ) Left Fem    (  ) SPC        (  ) pigtail       (  ) PEG tube       (  ) colostomy       (  ) jejunostomy  (  ) U-Dall    LABS:                        11.5   3.91  )-----------( 46       ( 08 Mar 2024 07:43 )             36.8     03-08    140  |  102  |  18  ----------------------------<  71  3.7   |  29  |  0.9    Ca    8.9      08 Mar 2024 07:43  Mg     2.2     03-08    TPro  7.3  /  Alb  3.8  /  TBili  1.1  /  DBili  x   /  AST  14  /  ALT  14  /  AlkPhos  105  03-08      Urinalysis Basic - ( 08 Mar 2024 07:43 )    Color: x / Appearance: x / SG: x / pH: x  Gluc: 71 mg/dL / Ketone: x  / Bili: x / Urobili: x   Blood: x / Protein: x / Nitrite: x   Leuk Esterase: x / RBC: x / WBC x   Sq Epi: x / Non Sq Epi: x / Bacteria: x                RADIOLOGY:

## 2024-03-08 NOTE — CONSULT NOTE ADULT - NS ATTEND AMEND GEN_ALL_CORE FT
Cardiomyopathy- etiology unknown  Pancytopenia    PVCs, NS-AT on Tele  EKG, Tele, Echo, Labs independently reviewed and d/w primary team. CM of unknown etiology  W-up of pancytopenia as per Hem-onc  Once that is performed, will need Ischemic w-up- prefer to be done as inpatient  Consider doing MRI heart once w-up is done  Will benefit from WCD. Discussed risk of Ventricular arrhythmia and risk of SCD w/non-compliance. Agreeable.  Tele  Recall when ready to be discharged.

## 2024-03-08 NOTE — PROGRESS NOTE ADULT - SUBJECTIVE AND OBJECTIVE BOX
Hematology Consult Note    HPI:  80 yo with no PMH presents for eval of new onset shortness of breath for the past few days. Patient reported that he started feeling SOB for the past few days at rest and on exertion and it was progressively worsening where he couldn't sleep for the past 2 nights bcz of orthopnea. He reported associated bilateral ankle swelling. Denies chest pain, palpitations, fever, chills, abdominal pain. This is the first time he experiences such sxs. He is usually an active man, never felt SOB or chest pain on exertion or climbing up the stairs. No known sick contacts, no recent travel, ex heavy smoker ( 1 pack per day > 30 yrs, stopped 8 yrs ago ), no alcohol use, no recent weight loss or night sweats. He did not follow u with a PCP for years.     Vitals: T 97.6, HR 75, /76, spo2 98% on 3.5 L NC   Labs: WBC 2.92, Hb 11.6, MCV 88.3, Plt 63, ANC 1250   trop 42 --. 42, K 3.4, Tb 1.8, pro-BN [id number]  VBG: lactate 2.8, pH 7.35, pCO2 43    EKG: sinus with PVCs, LVH   CXR bilateral basilar opacities, hilar congestion, increased vascular markings     s/p lasix 40 mg IV once in ED     Patient admitted for workup of new onset CHF and pancytopenia  (05 Mar 2024 20:14)      Allergies    No Known Allergies    Intolerances        MEDICATIONS  (STANDING):  furosemide    Tablet 20 milliGRAM(s) Oral daily  influenza  Vaccine (HIGH DOSE) 0.7 milliLiter(s) IntraMuscular once  metoprolol succinate ER 25 milliGRAM(s) Oral daily  potassium chloride  20 mEq/100 mL IVPB 20 milliEquivalent(s) IV Intermittent every 2 hours  sacubitril 24 mG/valsartan 26 mG 1 Tablet(s) Oral two times a day    MEDICATIONS  (PRN):      PAST MEDICAL & SURGICAL HISTORY:  No pertinent past medical history      No significant past surgical history          FAMILY HISTORY:  No pertinent family history in first degree relatives        SOCIAL HISTORY: No EtOH, no tobacco    REVIEW OF SYSTEMS:    CONSTITUTIONAL: No weakness, fevers or chills  EYES/ENT: No visual changes;  No vertigo or throat pain   NECK: No pain or stiffness  RESPIRATORY: No cough, wheezing, hemoptysis; No shortness of breath  CARDIOVASCULAR: No chest pain or palpitations  GASTROINTESTINAL: No abdominal or epigastric pain. No nausea, vomiting, or hematemesis; No diarrhea or constipation. No melena or hematochezia.  GENITOURINARY: No dysuria, frequency or hematuria  NEUROLOGICAL: No numbness or weakness  SKIN: No itching, burning, rashes, or lesions   All other review of systems is negative unless indicated above.        T(F): 97.7 (03-08-24 @ 11:59), Max: 97.7 (03-08-24 @ 11:59)  HR: 87 (03-08-24 @ 11:59)  BP: 104/63 (03-08-24 @ 11:59)  RR: 18 (03-08-24 @ 11:59)  SpO2: 98% (03-08-24 @ 10:10)  Wt(kg): --    GENERAL: NAD, well-developed  HEAD:  Atraumatic, Normocephalic  EYES: EOMI, PERRLA, conjunctiva and sclera clear  NECK: Supple, No JVD  CHEST/LUNG: Clear to auscultation bilaterally; No wheeze  HEART: Regular rate and rhythm; No murmurs, rubs, or gallops  ABDOMEN: Soft, Nontender, Nondistended; Bowel sounds present  EXTREMITIES:  2+ Peripheral Pulses, No clubbing, cyanosis, or edema  NEUROLOGY: non-focal  SKIN: No rashes or lesions                          11.5   3.91  )-----------( 46       ( 08 Mar 2024 07:43 )             36.8       03-08    140  |  102  |  18  ----------------------------<  71  3.7   |  29  |  0.9    Ca    8.9      08 Mar 2024 07:43  Mg     2.2     03-08    TPro  7.3  /  Alb  3.8  /  TBili  1.1  /  DBili  x   /  AST  14  /  ALT  14  /  AlkPhos  105  03-08      Magnesium: 2.2 mg/dL (03-08 @ 07:43)

## 2024-03-08 NOTE — PROGRESS NOTE ADULT - ASSESSMENT
78 yo with no PMH presents for eval of new onset shortness of breath for the past few days. Patient reported that he started feeling SOB for the past few days at rest and on exertion and it was progressively worsening where he couldn't sleep for the past 2 nights bcz of orthopnea. He reported associated bilateral ankle swelling.    #Shortness of breath 2/2 to volume overload   #R/O new onset CHF   - hemodynamically stable   - spo2 98% on 3.5 L NC - now on 2 L  - trop 42 --. 42, pro-BN [id number]  - VBG: lactate 2.8, pH 7.35, pCO2 43    - EKG: sinus with PVCs, LVH   - CXR bilateral basilar opacities, hilar congestion, increased vascular markings    - c/w lasix 40 mg IV BID - diuretics dc'ed  - strict I&O  - Echo reduced EF 25-30% - cardio recommending optimizing GDMT not a good candidate for   - monitor oxygen requirements   - f/u covid/flu/RSV   - keep K > 4 and Mg > 2     #Pancytopenia   - WBC 2.92, Hb 11.6, MCV 88.3, Plt 63, ANC 1250   - no B symptoms, no hx of alcohol use   - f/u retic count, LDH, iron studies, B12, folate, dino test, TSH   HIV, hep B and C profile, INR   - f/u RUQ US to r/o liver disease as cause of pancytopenia - no cirrhosis  - Heme/onc consult recs appreciated   - platelets dropping - stopped lovenox  -     #DVT ppx: lovenox - stopped due to platelets dropping  #GI ppx: NA   #Diet: DASH   #Activity: as tolerated 80 yo with no PMH presents for eval of new onset shortness of breath for the past few days. Patient reported that he started feeling SOB for the past few days at rest and on exertion and it was progressively worsening where he couldn't sleep for the past 2 nights bcz of orthopnea. He reported associated bilateral ankle swelling.    #Shortness of breath 2/2 to volume overload   #R/O new onset CHF   - hemodynamically stable   - spo2 98% on 3.5 L NC - now on 2 L  - trop 42 --. 42, pro-BN [id number]  - VBG: lactate 2.8, pH 7.35, pCO2 43    - EKG: sinus with PVCs, LVH   - CXR bilateral basilar opacities, hilar congestion, increased vascular markings    - c/w lasix 40 mg IV BID - diuretics dc'ed  - strict I&O  - Echo reduced EF 25-30% - cardio recommending optimizing GDMT not a good candidate for ischemic workup  - monitor oxygen requirements   - f/u covid/flu/RSV   - keep K > 4 and Mg > 2     #Pancytopenia   - WBC 2.92, Hb 11.6, MCV 88.3, Plt 63, ANC 1250   - no B symptoms, no hx of alcohol use   - f/u retic count, LDH, iron studies, B12, folate, dino test, TSH   HIV, hep B and C profile, INR   - f/u RUQ US to r/o liver disease as cause of pancytopenia - no cirrhosis  - Heme/onc consult recs appreciated   - platelets dropping - stopped lovenox    #DVT ppx: lovenox - stopped due to platelets dropping  #GI ppx: NA   #Diet: DASH   #Activity: as tolerated    Pending: heme onc workup

## 2024-03-08 NOTE — PROGRESS NOTE ADULT - ATTENDING COMMENTS
My note supersedes all residents notes that I sign, My correction for their notes are in my notes   On exam  General: awake, alert, NAD, chronic ill appearance  Lungs:  clear to ausculation b/l, normal resp effort  Heart: regular rhythm   Abdomen: soft, non tender non distended  Ext: no edema, can move all  his extremities     80 yo M with no significant PMH presents to ED for worsening BARRIENTOS.     Acute hypoxic respiratory failure 2/2 pulmonary edema 2/2 suspected new onset CHF  - CXR Bibasal pleural-parenchymal opacities.  CXR today Worsening opacifications, CHF.  Pt symptoms improving   - proBNP 94884  - monitor daily weight, strict I & O, Low Na diet with fluid restriction.   - monitor on Telemetry.   troponin 42--> 42--> 48   no active chest pain and the sob improved   Echo Left ventricular ejection fraction, by visual estimation, is25 to 30%.   Dilatation of the ascending aorta.  s/p iv lasix , would give lasix 20 po for now , monitor K for hypokalemia   cardiology consult appreciated-patient is not good candidate for aggressive cardiac workup at this point  will treat with GDMT for now as BP tolerat  metoprolol started , will start losartan in am if blood pressure allows   EP consulted - Once work up  of pancytopenia as per Hem-onc is performed, will need Ischemic w-up- prefer to be done as inpatient  Consider doing MRI heart once w-up is done  Will benefit from WCD. Discussed risk of Ventricular arrhythmia and risk of SCD w/non-compliance. Agreeable.  Tele Recall  EP when ready to be discharged.    Pancytopenia  RUQ sono with No definite liver cirrhosis. Liver echogenic foci measuring up to 2 cm, likely hemangiomas.  Hematology on board   peripheral smear: rare schistocytes giant platelets/platelet clumping, no spherocytosis,  f/u  MMA level  US spleen  splenomegaly  - please send flow cytometry,   follow up MMA level  - can start patient on oral Vitamin B12   - follow up CT Chest/Abdomen/Pelvis to look for lymphadenopathy, concern for possible lymphoproliferative disorder  - follow up flow cytometry   - repeat dino positive but given Hb is stable and very rare schistocytosis seen on smear, no need to treat hemolytic anemia at this time  - if above work up is non-revealing, will need inpatient bone marrow biopsy           DVT ppx: scd for now give thrombocytopenia ,  monitor platelet.     pending; cardiology, hematology, K level and platelet . losartan if blood pressure allows My note supersedes all residents notes that I sign, My correction for their notes are in my notes   On exam  General: awake, alert, NAD, chronic ill appearance  Lungs:  clear to ausculation b/l, normal resp effort  Heart: regular rhythm   Abdomen: soft, non tender non distended  Ext: no edema, can move all  his extremities     78 yo M with no significant PMH presents to ED for worsening BARRIENTOS.     Acute hypoxic respiratory failure 2/2 pulmonary edema 2/2 suspected new onset CHF  - CXR Bibasal pleural-parenchymal opacities.  CXR today Worsening opacifications, CHF.  Pt symptoms improving   - proBNP [id number]  - monitor daily weight, strict I & O, Low Na diet with fluid restriction.   - monitor on Telemetry.   troponin 42--> 42--> 48   no active chest pain and the sob improved   Echo Left ventricular ejection fraction, by visual estimation, is25 to 30%.   Dilatation of the ascending aorta.  s/p iv lasix , would give lasix 20 po for now , monitor K for hypokalemia   cardiology consult appreciated-patient is not good candidate for aggressive cardiac workup at this point  will treat with GDMT for now as BP tolerat  metoprolol started , will start losartan in am if blood pressure allows   EP consulted - Once work up  of pancytopenia as per Hem-onc is performed, will need Ischemic w-up- prefer to be done as inpatient  Consider doing MRI heart once w-up is done  Will benefit from WCD. Discussed risk of Ventricular arrhythmia and risk of SCD w/non-compliance. Agreeable.  Tele Recall  EP when ready to be discharged.    Pancytopenia  RUQ sono with No definite liver cirrhosis. Liver echogenic foci measuring up to 2 cm, likely hemangiomas.  Hematology on board   peripheral smear: rare schistocytes giant platelets/platelet clumping, no spherocytosis,  f/u  MMA level  US spleen  splenomegaly  - please send flow cytometry,   follow up MMA level  - can start patient on oral Vitamin B12   - follow up CT Chest/Abdomen/Pelvis to look for lymphadenopathy, concern for possible lymphoproliferative disorder  - follow up flow cytometry   - repeat dino positive but given Hb is stable and very rare schistocytosis seen on smear, no need to treat hemolytic anemia at this time  - if above work up is non-revealing, will need inpatient bone marrow biopsy           DVT ppx: scd for now give thrombocytopenia ,  monitor platelet.     pending; cardiology, hematology, K level and platelet . losartan if blood pressure allows      addendum: I Discussed with the patient  and his Niece Ms. Jarvis at bedside in details and they confirmed again DNR/DNI for now

## 2024-03-08 NOTE — CONSULT NOTE ADULT - SUBJECTIVE AND OBJECTIVE BOX
Patient is a 79y old  Male who presents with a chief complaint of shortness of breath (08 Mar 2024 11:02)    HPI:  78 yo with no PMH presents for eval of new onset shortness of breath for the past few days. Patient reported that he started feeling SOB for the past few days at rest and on exertion and it was progressively worsening where he couldn't sleep for the past 2 nights bcz of orthopnea. He reported associated bilateral ankle swelling. Denies chest pain, palpitations, fever, chills, abdominal pain. This is the first time he experiences such sxs. He is usually an active man, never felt SOB or chest pain on exertion or climbing up the stairs. No known sick contacts, no recent travel, ex heavy smoker ( 1 pack per day > 30 yrs, stopped 8 yrs ago ), no alcohol use, no recent weight loss or night sweats. He did not follow u with a PCP for years.     Vitals: T 97.6, HR 75, /76, spo2 98% on 3.5 L NC   Labs: WBC 2.92, Hb 11.6, MCV 88.3, Plt 63, ANC 1250   trop 42 --. 42, K 3.4, Tb 1.8, pro-BN [id number]  VBG: lactate 2.8, pH 7.35, pCO2 43    EKG: sinus with PVCs, LVH   CXR bilateral basilar opacities, hilar congestion, increased vascular markings     s/p lasix 40 mg IV once in ED     Patient admitted for workup of new onset CHF and pancytopenia  (05 Mar 2024 20:14)    EP was consulted for WCD due to low EF    PAST MEDICAL & SURGICAL HISTORY:  No pertinent past medical history      No significant past surgical history    PREVIOUS DIAGNOSTIC TESTING:      ECHO  FINDINGS:  < from: TTE Echo Complete w/o Contrast w/ Doppler (03.07.24 @ 10:28) >  Summary:   1. Left ventricular ejection fraction, by visual estimation, is25 to   30%.   2. Severely decreased global left ventricular systolic function.   3. Mildly enlarged left atrium.   4. Normal right atrial size.   5. Moderate mitral valve regurgitation.   6. Thickening of the anterior and posterior mitral valve leaflets.   7. Mild tricuspid regurgitation.   8. Mild aortic regurgitation.   9. Sclerotic aortic valve with normal opening.  10. Dilatation of the ascending aorta.    < end of copied text >      MEDICATIONS  (STANDING):  furosemide    Tablet 20 milliGRAM(s) Oral daily  influenza  Vaccine (HIGH DOSE) 0.7 milliLiter(s) IntraMuscular once  metoprolol succinate ER 25 milliGRAM(s) Oral daily  potassium chloride  20 mEq/100 mL IVPB 20 milliEquivalent(s) IV Intermittent every 2 hours  sacubitril 24 mG/valsartan 26 mG 1 Tablet(s) Oral two times a day    MEDICATIONS  (PRN):      FAMILY HISTORY:  No pertinent family history in first degree relatives    SOCIAL HISTORY:    CIGARETTES: none    ALCOHOL: none    Past Surgical History:    Allergies:    No Known Allergies      REVIEW OF SYSTEMS:  as above    Vital Signs Last 24 Hrs  T(C): 36.5 (08 Mar 2024 11:59), Max: 36.5 (08 Mar 2024 11:59)  T(F): 97.7 (08 Mar 2024 11:59), Max: 97.7 (08 Mar 2024 11:59)  HR: 87 (08 Mar 2024 11:59) (79 - 87)  BP: 104/63 (08 Mar 2024 11:59) (104/63 - 157/64)  BP(mean): --  RR: 18 (08 Mar 2024 11:59) (18 - 18)  SpO2: 98% (08 Mar 2024 10:10) (98% - 98%)    Parameters below as of 08 Mar 2024 10:10  Patient On (Oxygen Delivery Method): nasal cannula  O2 Flow (L/min): 2      PHYSICAL EXAM:    GENERAL: In no apparent distress, well nourished, and hydrated.  HEART: Regular rate and rhythm; No murmurs, rubs, or gallops.  PULMONARY: Clear to auscultation and perfusion.  No rales, wheezing, or rhonchi bilaterally.  ABDOMEN: Soft, Nontender, Nondistended; Bowel sounds present  EXTREMITIES:  2+ Peripheral Pulses, No clubbing, cyanosis, or edema  NEUROLOGICAL: Grossly nonfocal    INTERPRETATION OF TELEMETRY:  NSR    ECG:  < from: 12 Lead ECG (03.05.24 @ 15:39) >  Ventricular Rate 93 BPM    Atrial Rate 93 BPM    P-R Interval 144 ms    QRS Duration 90 ms    Q-T Interval 398 ms    QTC Calculation(Bazett) 494 ms    P Axis 81 degrees    R Axis 108 degrees    T Axis 64 degrees    Diagnosis Line Sinus rhythm with frequent and consecutive Premature ventricular complexes  Rightward axis  Left ventricular hypertrophy with repolarization abnormality  Cannot rule out Inferior infarct , age undetermined  Anterior infarct , age undetermined  Abnormal ECG    < end of copied text >        LABS:                        11.5   3.91  )-----------( 46       ( 08 Mar 2024 07:43 )             36.8     03-08    140  |  102  |  18  ----------------------------<  71  3.7   |  29  |  0.9    Ca    8.9      08 Mar 2024 07:43  Mg     2.2     03-08    TPro  7.3  /  Alb  3.8  /  TBili  1.1  /  DBili  x   /  AST  14  /  ALT  14  /  AlkPhos  105  03-08          Urinalysis Basic - ( 08 Mar 2024 07:43 )    Color: x / Appearance: x / SG: x / pH: x  Gluc: 71 mg/dL / Ketone: x  / Bili: x / Urobili: x   Blood: x / Protein: x / Nitrite: x   Leuk Esterase: x / RBC: x / WBC x   Sq Epi: x / Non Sq Epi: x / Bacteria: x      BNP      RADIOLOGY & ADDITIONAL STUDIES:

## 2024-03-09 LAB
ALBUMIN SERPL ELPH-MCNC: 3.4 G/DL — LOW (ref 3.5–5.2)
ALP SERPL-CCNC: 99 U/L — SIGNIFICANT CHANGE UP (ref 30–115)
ALT FLD-CCNC: 12 U/L — SIGNIFICANT CHANGE UP (ref 0–41)
ANION GAP SERPL CALC-SCNC: 11 MMOL/L — SIGNIFICANT CHANGE UP (ref 7–14)
AST SERPL-CCNC: 12 U/L — SIGNIFICANT CHANGE UP (ref 0–41)
BASOPHILS # BLD AUTO: 0.04 K/UL — SIGNIFICANT CHANGE UP (ref 0–0.2)
BASOPHILS NFR BLD AUTO: 1.3 % — HIGH (ref 0–1)
BILIRUB SERPL-MCNC: 1.1 MG/DL — SIGNIFICANT CHANGE UP (ref 0.2–1.2)
BUN SERPL-MCNC: 17 MG/DL — SIGNIFICANT CHANGE UP (ref 10–20)
CALCIUM SERPL-MCNC: 8.5 MG/DL — SIGNIFICANT CHANGE UP (ref 8.4–10.5)
CHLORIDE SERPL-SCNC: 100 MMOL/L — SIGNIFICANT CHANGE UP (ref 98–110)
CO2 SERPL-SCNC: 30 MMOL/L — SIGNIFICANT CHANGE UP (ref 17–32)
CREAT SERPL-MCNC: 0.9 MG/DL — SIGNIFICANT CHANGE UP (ref 0.7–1.5)
DAT C3-SP REAG RBC QL: ABNORMAL
DAT IGG-SP REAG RBC-IMP: ABNORMAL
DIR ANTIGLOB POLYSPECIFIC INTERPRETATION: ABNORMAL
EGFR: 87 ML/MIN/1.73M2 — SIGNIFICANT CHANGE UP
EOSINOPHIL # BLD AUTO: 0.02 K/UL — SIGNIFICANT CHANGE UP (ref 0–0.7)
EOSINOPHIL NFR BLD AUTO: 0.6 % — SIGNIFICANT CHANGE UP (ref 0–8)
GLUCOSE SERPL-MCNC: 77 MG/DL — SIGNIFICANT CHANGE UP (ref 70–99)
HCT VFR BLD CALC: 36.2 % — LOW (ref 42–52)
HGB BLD-MCNC: 11.3 G/DL — LOW (ref 14–18)
IMM GRANULOCYTES NFR BLD AUTO: 0.3 % — SIGNIFICANT CHANGE UP (ref 0.1–0.3)
LYMPHOCYTES # BLD AUTO: 1.55 K/UL — SIGNIFICANT CHANGE UP (ref 1.2–3.4)
LYMPHOCYTES # BLD AUTO: 48.6 % — SIGNIFICANT CHANGE UP (ref 20.5–51.1)
MAGNESIUM SERPL-MCNC: 2.2 MG/DL — SIGNIFICANT CHANGE UP (ref 1.8–2.4)
MCHC RBC-ENTMCNC: 27.4 PG — SIGNIFICANT CHANGE UP (ref 27–31)
MCHC RBC-ENTMCNC: 31.2 G/DL — LOW (ref 32–37)
MCV RBC AUTO: 87.9 FL — SIGNIFICANT CHANGE UP (ref 80–94)
MONOCYTES # BLD AUTO: 0.63 K/UL — HIGH (ref 0.1–0.6)
MONOCYTES NFR BLD AUTO: 19.7 % — HIGH (ref 1.7–9.3)
NEUTROPHILS # BLD AUTO: 0.94 K/UL — LOW (ref 1.4–6.5)
NEUTROPHILS NFR BLD AUTO: 29.5 % — LOW (ref 42.2–75.2)
NRBC # BLD: 0 /100 WBCS — SIGNIFICANT CHANGE UP (ref 0–0)
PLATELET # BLD AUTO: 51 K/UL — LOW (ref 130–400)
PMV BLD: 12.2 FL — HIGH (ref 7.4–10.4)
POTASSIUM SERPL-MCNC: 3.7 MMOL/L — SIGNIFICANT CHANGE UP (ref 3.5–5)
POTASSIUM SERPL-SCNC: 3.7 MMOL/L — SIGNIFICANT CHANGE UP (ref 3.5–5)
PROT SERPL-MCNC: 6.9 G/DL — SIGNIFICANT CHANGE UP (ref 6–8)
RBC # BLD: 4.12 M/UL — LOW (ref 4.7–6.1)
RBC # FLD: 19.5 % — HIGH (ref 11.5–14.5)
SODIUM SERPL-SCNC: 141 MMOL/L — SIGNIFICANT CHANGE UP (ref 135–146)
WBC # BLD: 3.19 K/UL — LOW (ref 4.8–10.8)
WBC # FLD AUTO: 3.19 K/UL — LOW (ref 4.8–10.8)

## 2024-03-09 PROCEDURE — 99232 SBSQ HOSP IP/OBS MODERATE 35: CPT

## 2024-03-09 RX ORDER — ENOXAPARIN SODIUM 100 MG/ML
40 INJECTION SUBCUTANEOUS EVERY 24 HOURS
Refills: 0 | Status: DISCONTINUED | OUTPATIENT
Start: 2024-03-09 | End: 2024-03-12

## 2024-03-09 RX ORDER — POTASSIUM CHLORIDE 20 MEQ
20 PACKET (EA) ORAL
Refills: 0 | Status: COMPLETED | OUTPATIENT
Start: 2024-03-09 | End: 2024-03-09

## 2024-03-09 RX ORDER — PREGABALIN 225 MG/1
1000 CAPSULE ORAL DAILY
Refills: 0 | Status: DISCONTINUED | OUTPATIENT
Start: 2024-03-09 | End: 2024-03-15

## 2024-03-09 RX ADMIN — Medication 25 MILLIGRAM(S): at 05:54

## 2024-03-09 RX ADMIN — Medication 20 MILLIEQUIVALENT(S): at 10:38

## 2024-03-09 RX ADMIN — PREGABALIN 1000 MICROGRAM(S): 225 CAPSULE ORAL at 11:14

## 2024-03-09 RX ADMIN — ENOXAPARIN SODIUM 40 MILLIGRAM(S): 100 INJECTION SUBCUTANEOUS at 11:06

## 2024-03-09 RX ADMIN — Medication 20 MILLIEQUIVALENT(S): at 15:09

## 2024-03-09 RX ADMIN — Medication 20 MILLIGRAM(S): at 05:54

## 2024-03-09 RX ADMIN — LOSARTAN POTASSIUM 25 MILLIGRAM(S): 100 TABLET, FILM COATED ORAL at 05:53

## 2024-03-09 NOTE — PROGRESS NOTE ADULT - SUBJECTIVE AND OBJECTIVE BOX
24H events:    Patient is a 79y old Male who presents with a chief complaint of shortness of breath (08 Mar 2024 15:42)    Primary diagnosis of SOB (shortness of breath)    Today is hospital day 4d. This morning patient was seen and examined at bedside, resting comfortably in bed.    No acute or major events overnight.    Code Status:    Family communication:  Contact date:  Name of person contacted:  Relationship to patient:  Communication details:  What matters most:    PAST MEDICAL & SURGICAL HISTORY  No pertinent past medical history    No significant past surgical history      SOCIAL HISTORY:  Social History:      ALLERGIES:  No Known Allergies    MEDICATIONS:  STANDING MEDICATIONS  cyanocobalamin 1000 MICROGram(s) Oral daily  furosemide    Tablet 20 milliGRAM(s) Oral daily  influenza  Vaccine (HIGH DOSE) 0.7 milliLiter(s) IntraMuscular once  losartan 25 milliGRAM(s) Oral daily  metoprolol succinate ER 25 milliGRAM(s) Oral daily  potassium chloride  20 mEq/100 mL IVPB 20 milliEquivalent(s) IV Intermittent every 2 hours    PRN MEDICATIONS  guaiFENesin Oral Liquid (Sugar-Free) 100 milliGRAM(s) Oral every 6 hours PRN    VITALS:   T(F): 98.3  HR: 81  BP: 112/55  RR: 19  SpO2: 97%    PHYSICAL EXAM:  GENERAL:   ( x ) NAD, lying in bed comfortably     (  ) obtunded     (  ) lethargic     (  ) somnolent    HEAD:   ( x ) Atraumatic     (  ) hematoma     (  ) laceration (specify location:       )     NECK:  (  ) Supple     (  ) neck stiffness     (  ) nuchal rigidity     (  )  no JVD     (  ) JVD present ( -- cm)    HEART:  Rate -->     ( x ) normal rate     (  ) bradycardic     (  ) tachycardic  Rhythm -->     ( x ) regular     (  ) regularly irregular     (  ) irregularly irregular  Murmurs -->     ( x ) normal s1s2     (  ) systolic murmur     (  ) diastolic murmur     (  ) continuous murmur      (  ) S3 present     (  ) S4 present    LUNGS:   ( x )Unlabored respirations     (  ) tachypnea  ( x ) B/L air entry     (  ) decreased breath sounds in:  (location     )    (  ) no adventitious sound     (  ) crackles     (  ) wheezing      (  ) rhonchi      (specify location:       )  (  ) chest wall tenderness (specify location:       )    ABDOMEN:   ( x ) Soft     (  ) tense   |   ( x ) nondistended     (  ) distended   |   (  ) +BS     (  ) hypoactive bowel sounds     (  ) hyperactive bowel sounds  (  ) nontender     (  ) RUQ tenderness     (  ) RLQ tenderness     (  ) LLQ tenderness     (  ) epigastric tenderness     (  ) diffuse tenderness  (  ) Splenomegaly      (  ) Hepatomegaly      (  ) Jaundice     (  ) ecchymosis     EXTREMITIES:  ( x ) Normal     (  ) Rash     (  ) ecchymosis     (  ) varicose veins      (  ) pitting edema     (  ) non-pitting edema   (  ) ulceration     (  ) gangrene:     (location:     )    NERVOUS SYSTEM:    ( x ) A&Ox3     (  ) confused     (  ) lethargic  CN II-XII:     (  ) Intact     (  ) deficits found     (Specify:     )   Upper extremities:     (  ) no sensorimotor deficits     (  ) weakness     (  ) loss of proprioception/vibration     (  ) loss of touch/temperature (specify:    )  Lower extremities:     (  ) no sensorimotor deficits     (  ) weakness     (  ) loss of proprioception/vibration     (  ) loss of touch/temperature (specify:    )    (  ) Indwelling Tadeo Catheter:   Date insterted:    Reason (  ) Critical illness     (  ) urinary retention    (  ) Accurate Ins/Outs Monitoring     (  ) CMO patient    (  ) Central Line:   Date inserted:  Location: (  ) Right IJ     (  ) Left IJ     (  ) Right Fem     (  ) Left Fem    (  ) SPC        (  ) pigtail       (  ) PEG tube       (  ) colostomy       (  ) jejunostomy  (  ) U-Dall    LABS:                        11.3   3.19  )-----------( 51       ( 09 Mar 2024 07:48 )             36.2     03-09    141  |  100  |  17  ----------------------------<  77  3.7   |  30  |  0.9    Ca    8.5      09 Mar 2024 07:48  Mg     2.2     03-09    TPro  6.9  /  Alb  3.4<L>  /  TBili  1.1  /  DBili  x   /  AST  12  /  ALT  12  /  AlkPhos  99  03-09      Urinalysis Basic - ( 09 Mar 2024 07:48 )    Color: x / Appearance: x / SG: x / pH: x  Gluc: 77 mg/dL / Ketone: x  / Bili: x / Urobili: x   Blood: x / Protein: x / Nitrite: x   Leuk Esterase: x / RBC: x / WBC x   Sq Epi: x / Non Sq Epi: x / Bacteria: x                RADIOLOGY:

## 2024-03-09 NOTE — PROGRESS NOTE ADULT - ASSESSMENT
80 yo with no PMH presents for eval of new onset shortness of breath for the past few days. Patient reported that he started feeling SOB for the past few days at rest and on exertion and it was progressively worsening where he couldn't sleep for the past 2 nights bcz of orthopnea. He reported associated bilateral ankle swelling.    #Shortness of breath 2/2 to volume overload   #R/O new onset CHF   - hemodynamically stable   - spo2 98% on 3.5 L NC - now on 2 L  - trop 42 --. 42, pro-BN [id number]  - VBG: lactate 2.8, pH 7.35, pCO2 43    - EKG: sinus with PVCs, LVH   - CXR bilateral basilar opacities, hilar congestion, increased vascular markings    - c/w lasix 40 mg IV BID - diuretics dc'ed  - strict I&O  - Echo reduced EF 25-30% - cardio recommending optimizing GDMT not a good candidate for ischemic workup  - monitor oxygen requirements   - f/u covid/flu/RSV   - keep K > 4 and Mg > 2   - will follow with cardio    #Pancytopenia   - WBC 2.92, Hb 11.6, MCV 88.3, Plt 63, ANC 1250   - no B symptoms, no hx of alcohol use   - f/u retic count, LDH, iron studies, B12, folate, dino test, TSH   HIV, hep B and C profile, INR   - f/u RUQ US to r/o liver disease as cause of pancytopenia - no cirrhosis  - Heme/onc consult recs appreciated   - platelets dropping - stopped lovenox    #DVT ppx: lovenox - stopped due to platelets dropping  #GI ppx: NA   #Diet: DASH   #Activity: as tolerated

## 2024-03-09 NOTE — PROGRESS NOTE ADULT - ATTENDING COMMENTS
On exam  General: awake, alert, NAD, chronic ill appearance  Lungs:  clear to ausculation b/l, normal resp effort  Heart: regular rhythm   Abdomen: soft, non tender non distended  Ext: no edema, can move all  his extremities     80 yo M with no significant PMH presents to ED for worsening BARRIENTOS.     Acute hypoxic respiratory failure 2/2 pulmonary edema 2/2 suspected new onset CHF  - CXR Bibasal pleural-parenchymal opacities.  CXR today Worsening opacifications, CHF.  Pt symptoms improving   - proBNP 15088  - monitor daily weight, strict I & O, Low Na diet with fluid restriction.   - monitor on Telemetry.   troponin 42--> 42--> 48   no active chest pain and the sob improved   Echo Left ventricular ejection fraction, by visual estimation, is25 to 30%.   Dilatation of the ascending aorta.  s/p iv lasix , would give lasix 20 po for now , monitor K for hypokalemia   cardiology consult appreciated-patient is not good candidate for aggressive cardiac workup at this point  will treat with GDMT for now as BP tolerat  metoprolol started , will start losartan in am if blood pressure allows   EP consulted - Once work up  of pancytopenia as per Hem-onc is performed, will need Ischemic w-up- prefer to be done as inpatient  Consider doing MRI heart once w-up is done  Will benefit from WCD. Discussed risk of Ventricular arrhythmia and risk of SCD w/non-compliance. Agreeable.  Tele Recall  EP when ready to be discharged.    Pancytopenia  RUQ sono with No definite liver cirrhosis. Liver echogenic foci measuring up to 2 cm, likely hemangiomas.  Hematology on board   peripheral smear: rare schistocytes giant platelets/platelet clumping, no spherocytosis,  f/u  MMA level  US spleen  splenomegaly  follow up MMA level  - can start patient on oral Vitamin B12   - follow up CT Chest/Abdomen/Pelvis to look for lymphadenopathy ( done pending report) , concern for possible lymphoproliferative disorder  - follow up flow cytometry   - repeat dino positive   - if above work up is non-revealing, will need inpatient bone marrow biopsy   B12 po started as per hem         DVT ppx: lovenox resumed - as per hem verbal reccs can give dvt ppx as long as Plt > 30 and no bleeding     pending; cardiology, hematology, K level and platelet / EP        I Discussed with the patient  and his Niece Ms. Jarvis at bedside 3/8  in details and they confirmed again DNR/DNI for now.

## 2024-03-10 LAB
ALBUMIN SERPL ELPH-MCNC: 3.5 G/DL — SIGNIFICANT CHANGE UP (ref 3.5–5.2)
ALP SERPL-CCNC: 91 U/L — SIGNIFICANT CHANGE UP (ref 30–115)
ALT FLD-CCNC: 12 U/L — SIGNIFICANT CHANGE UP (ref 0–41)
ANION GAP SERPL CALC-SCNC: 13 MMOL/L — SIGNIFICANT CHANGE UP (ref 7–14)
AST SERPL-CCNC: 12 U/L — SIGNIFICANT CHANGE UP (ref 0–41)
BASOPHILS # BLD AUTO: 0.04 K/UL — SIGNIFICANT CHANGE UP (ref 0–0.2)
BASOPHILS NFR BLD AUTO: 1 % — SIGNIFICANT CHANGE UP (ref 0–1)
BILIRUB SERPL-MCNC: 1 MG/DL — SIGNIFICANT CHANGE UP (ref 0.2–1.2)
BUN SERPL-MCNC: 18 MG/DL — SIGNIFICANT CHANGE UP (ref 10–20)
CALCIUM SERPL-MCNC: 8.9 MG/DL — SIGNIFICANT CHANGE UP (ref 8.4–10.4)
CHLORIDE SERPL-SCNC: 103 MMOL/L — SIGNIFICANT CHANGE UP (ref 98–110)
CO2 SERPL-SCNC: 26 MMOL/L — SIGNIFICANT CHANGE UP (ref 17–32)
CREAT SERPL-MCNC: 0.8 MG/DL — SIGNIFICANT CHANGE UP (ref 0.7–1.5)
DIR ANTIGLOB POLYSPECIFIC INTERPRETATION: SIGNIFICANT CHANGE UP
EGFR: 90 ML/MIN/1.73M2 — SIGNIFICANT CHANGE UP
EOSINOPHIL # BLD AUTO: 0.01 K/UL — SIGNIFICANT CHANGE UP (ref 0–0.7)
EOSINOPHIL NFR BLD AUTO: 0.3 % — SIGNIFICANT CHANGE UP (ref 0–8)
GLUCOSE SERPL-MCNC: 86 MG/DL — SIGNIFICANT CHANGE UP (ref 70–99)
HCT VFR BLD CALC: 34.2 % — LOW (ref 42–52)
HGB BLD-MCNC: 10.7 G/DL — LOW (ref 14–18)
IMM GRANULOCYTES NFR BLD AUTO: 0.5 % — HIGH (ref 0.1–0.3)
LYMPHOCYTES # BLD AUTO: 1.39 K/UL — SIGNIFICANT CHANGE UP (ref 1.2–3.4)
LYMPHOCYTES # BLD AUTO: 36.4 % — SIGNIFICANT CHANGE UP (ref 20.5–51.1)
MAGNESIUM SERPL-MCNC: 2.1 MG/DL — SIGNIFICANT CHANGE UP (ref 1.8–2.4)
MCHC RBC-ENTMCNC: 27.6 PG — SIGNIFICANT CHANGE UP (ref 27–31)
MCHC RBC-ENTMCNC: 31.3 G/DL — LOW (ref 32–37)
MCV RBC AUTO: 88.4 FL — SIGNIFICANT CHANGE UP (ref 80–94)
MONOCYTES # BLD AUTO: 0.91 K/UL — HIGH (ref 0.1–0.6)
MONOCYTES NFR BLD AUTO: 23.8 % — HIGH (ref 1.7–9.3)
NEUTROPHILS # BLD AUTO: 1.45 K/UL — SIGNIFICANT CHANGE UP (ref 1.4–6.5)
NEUTROPHILS NFR BLD AUTO: 38 % — LOW (ref 42.2–75.2)
NRBC # BLD: 0 /100 WBCS — SIGNIFICANT CHANGE UP (ref 0–0)
PLATELET # BLD AUTO: 41 K/UL — LOW (ref 130–400)
PMV BLD: 11.7 FL — HIGH (ref 7.4–10.4)
POTASSIUM SERPL-MCNC: 4.1 MMOL/L — SIGNIFICANT CHANGE UP (ref 3.5–5)
POTASSIUM SERPL-SCNC: 4.1 MMOL/L — SIGNIFICANT CHANGE UP (ref 3.5–5)
PROT SERPL-MCNC: 6.9 G/DL — SIGNIFICANT CHANGE UP (ref 6–8)
RBC # BLD: 3.87 M/UL — LOW (ref 4.7–6.1)
RBC # FLD: 19.2 % — HIGH (ref 11.5–14.5)
SODIUM SERPL-SCNC: 142 MMOL/L — SIGNIFICANT CHANGE UP (ref 135–146)
WBC # BLD: 3.82 K/UL — LOW (ref 4.8–10.8)
WBC # FLD AUTO: 3.82 K/UL — LOW (ref 4.8–10.8)

## 2024-03-10 PROCEDURE — 93971 EXTREMITY STUDY: CPT | Mod: 26,RT

## 2024-03-10 PROCEDURE — 99232 SBSQ HOSP IP/OBS MODERATE 35: CPT

## 2024-03-10 RX ORDER — FUROSEMIDE 40 MG
40 TABLET ORAL DAILY
Refills: 0 | Status: DISCONTINUED | OUTPATIENT
Start: 2024-03-11 | End: 2024-03-13

## 2024-03-10 RX ADMIN — PREGABALIN 1000 MICROGRAM(S): 225 CAPSULE ORAL at 12:31

## 2024-03-10 RX ADMIN — Medication 100 MILLIGRAM(S): at 15:56

## 2024-03-10 RX ADMIN — Medication 25 MILLIGRAM(S): at 06:04

## 2024-03-10 RX ADMIN — LOSARTAN POTASSIUM 25 MILLIGRAM(S): 100 TABLET, FILM COATED ORAL at 06:04

## 2024-03-10 RX ADMIN — Medication 20 MILLIGRAM(S): at 06:04

## 2024-03-10 RX ADMIN — Medication 100 MILLIGRAM(S): at 22:43

## 2024-03-10 RX ADMIN — ENOXAPARIN SODIUM 40 MILLIGRAM(S): 100 INJECTION SUBCUTANEOUS at 12:31

## 2024-03-10 NOTE — PROGRESS NOTE ADULT - ATTENDING COMMENTS
seen/ examined w/ hemonc fellow;note reviewed; case discussed; agree w/plan  bone marrow biopsy is recommended; to evaluate pancytopenia   CT CAP reviewed; labs reviewed

## 2024-03-10 NOTE — PROGRESS NOTE ADULT - ASSESSMENT
78 yo with no PMH presents for eval of new onset shortness of breath for the past few days. Patient reported that he started feeling SOB for the past few days at rest and on exertion and it was progressively worsening where he couldn't sleep for the past 2 nights bcz of orthopnea. He reported associated bilateral ankle swelling.      #Pancytopenia  - no prior blood work for review  - no hx of pancytopenia, malignancy, or hematological disorder per patient  - not taking any medications, denies alcohol and drug use  - Hemoglobin: 10.7 g/dL (03.06.24 @ 06:36)  - Mean Cell Volume: 89.7 fL (03.06.24 @ 06:36)  - Platelet Count - Automated: 51 K/uL (03.06.24 @ 06:36)  - WBC Count: 2.43 K/uL (03.06.24 @ 06:36)  - Auto Neutrophil #: 1.17 K/uL (03.06.24 @ 06:36)  - US liver no definite evidence of cirrhosis   - B12 and folate level noted   - iron studies WNL   - peripheral smear: rare schistocytes giant platelets/platelet clumping, no spherocytosis,   - being worked up by cardiology for HF with reduced ejection fraction 25%   - CT C/A/P : no signs of any LAD  - repeat COOMB test positive x 3    Recommendations:   - follow up MMA level  - can start patient on oral Vitamin B12   - We discussed with patient that we will like to BM biopsy to complete work up for pancytopenia. He will think and let us know tomorrow.   - follow up flow cytometry   - repeat dino positive but given Hb is stable and very rare schistocytosis seen on smear, no need to treat hemolytic anemia at this time  - monitor daily cbc w/ differential

## 2024-03-10 NOTE — PROGRESS NOTE ADULT - ASSESSMENT
80 yo M with no significant PMH presents to ED for worsening BARRIENTOS.     Acute hypoxic respiratory failure 2/2 pulmonary edema 2/2 suspected new onset CHF  - CXR Bibasal pleural-parenchymal opacities.  CXR today Worsening opacifications, CHF.  Pt symptoms improving   - proBNP 08876  - monitor daily weight, strict I & O, Low Na diet with fluid restriction.   - monitor on Telemetry.   troponin 42--> 42--> 48   no active chest pain and the sob improved   Echo Left ventricular ejection fraction, by visual estimation, is25 to 30%.   Dilatation of the ascending aorta.  s/p iv lasix ,increase lasix po to 40 daily   cardiology consult appreciated-patient is not good candidate for aggressive cardiac workup at this point  will treat with GDMT for now as BP tolerat  metoprolol started , will start losartan in am if blood pressure allows   EP consulted - Once work up  of pancytopenia as per Hem-onc is performed, will need Ischemic w-up- prefer to be done as inpatient  Consider doing MRI heart once w-up is done  Will benefit from WCD. Discussed risk of Ventricular arrhythmia and risk of SCD w/non-compliance. Agreeable.  Tele Recall  EP when ready to be discharged.    Pancytopenia  RUQ sono with No definite liver cirrhosis. Liver echogenic foci measuring up to 2 cm, likely hemangiomas.  Hematology on board   peripheral smear: rare schistocytes giant platelets/platelet clumping, no spherocytosis,  f/u  MMA level  US spleen  splenomegaly  follow up MMA level  - can start patient on oral Vitamin B12   - follow up flow cytometry   - repeat dino positive   - if above work up is non-revealing, will need inpatient bone marrow biopsy   B12 po started as per hem   as per Hem/onc - need BM biopsy to complete work up for pancytopenia. the patient  will think and let us know tomorrow.   CTAP NO LAD but noted lung lesions - pulmonary consult   incentive spirometry         DVT ppx: lovenox resumed - as per hem verbal reccs can give dvt ppx as long as Plt > 30 and no bleeding     pending; cardiology, hematology, K level and platelet / EP/ pulmonary   PT

## 2024-03-10 NOTE — CHART NOTE - NSCHARTNOTEFT_GEN_A_CORE
Right upper arm venous duplex prelim read was superficial phlebitis. Will opt for conservative measures, limb elevation and ice pack application. Can use NSAIDs for pain control.

## 2024-03-10 NOTE — PROGRESS NOTE ADULT - SUBJECTIVE AND OBJECTIVE BOX
LENGTH OF HOSPITAL STAY: 5d    CHIEF COMPLAINT:   Patient is a 79y old  Male who presents with a chief complaint of shortness of breath (10 Mar 2024 11:26)      HISTORY OF PRESENTING ILLNESS:    HPI:  78 yo with no PMH presents for eval of new onset shortness of breath for the past few days. Patient reported that he started feeling SOB for the past few days at rest and on exertion and it was progressively worsening where he couldn't sleep for the past 2 nights bcz of orthopnea. He reported associated bilateral ankle swelling. Denies chest pain, palpitations, fever, chills, abdominal pain. This is the first time he experiences such sxs. He is usually an active man, never felt SOB or chest pain on exertion or climbing up the stairs. No known sick contacts, no recent travel, ex heavy smoker ( 1 pack per day > 30 yrs, stopped 8 yrs ago ), no alcohol use, no recent weight loss or night sweats. He did not follow u with a PCP for years.     Vitals: T 97.6, HR 75, /76, spo2 98% on 3.5 L NC   Labs: WBC 2.92, Hb 11.6, MCV 88.3, Plt 63, ANC 1250   trop 42 --. 42, K 3.4, Tb 1.8, pro-BN [id number]  VBG: lactate 2.8, pH 7.35, pCO2 43    EKG: sinus with PVCs, LVH   CXR bilateral basilar opacities, hilar congestion, increased vascular markings     s/p lasix 40 mg IV once in ED     Patient admitted for workup of new onset CHF and pancytopenia  (05 Mar 2024 20:14)    PAST MEDICAL & SURGICAL HISTORY  PAST MEDICAL & SURGICAL HISTORY:  No pertinent past medical history      No significant past surgical history        SOCIAL HISTORY:    ALLERGIES:  No Known Allergies    MEDICATIONS:  STANDING MEDICATIONS  cyanocobalamin 1000 MICROGram(s) Oral daily  enoxaparin Injectable 40 milliGRAM(s) SubCutaneous every 24 hours  furosemide    Tablet 20 milliGRAM(s) Oral daily  influenza  Vaccine (HIGH DOSE) 0.7 milliLiter(s) IntraMuscular once  losartan 25 milliGRAM(s) Oral daily  metoprolol succinate ER 25 milliGRAM(s) Oral daily  potassium chloride  20 mEq/100 mL IVPB 20 milliEquivalent(s) IV Intermittent every 2 hours    PRN MEDICATIONS  guaiFENesin Oral Liquid (Sugar-Free) 100 milliGRAM(s) Oral every 6 hours PRN    VITALS:   T(F): 98.2  HR: 81  BP: 111/58  RR: 18  SpO2: 99%    LABS:                        10.7   3.82  )-----------( 41       ( 10 Mar 2024 11:50 )             34.2     03-10    142  |  103  |  18  ----------------------------<  86  4.1   |  26  |  0.8    Ca    8.9      10 Mar 2024 05:55  Mg     2.1     03-10    TPro  6.9  /  Alb  3.5  /  TBili  1.0  /  DBili  x   /  AST  12  /  ALT  12  /  AlkPhos  91  03-10      Urinalysis Basic - ( 10 Mar 2024 05:55 )    Color: x / Appearance: x / SG: x / pH: x  Gluc: 86 mg/dL / Ketone: x  / Bili: x / Urobili: x   Blood: x / Protein: x / Nitrite: x   Leuk Esterase: x / RBC: x / WBC x   Sq Epi: x / Non Sq Epi: x / Bacteria: x                RADIOLOGY:    PHYSICAL EXAM:  GEN: No acute distress  HEENT:   LUNGS: Clear to auscultation bilaterally   HEART: S1/S2 present. RRR.   ABD: Soft, non-tender, non-distended. Bowel sounds present  EXT:  NEURO: AAOX3

## 2024-03-10 NOTE — PROGRESS NOTE ADULT - SUBJECTIVE AND OBJECTIVE BOX
T H I S   I S    N O  T   A    F I N A L I Z E D   N O T JOANN CLINE, NIYA  79y, Male  Allergy: No Known Allergies    Hospital Day: 5d    Patient seen and examined earlier today.     PMH/PSH:  PAST MEDICAL & SURGICAL HISTORY:  No pertinent past medical history      No significant past surgical history          LAST 24-Hr EVENTS:    VITALS:  T(F): 98.4 (03-10-24 @ 04:10), Max: 98.4 (03-10-24 @ 04:10)  HR: 85 (03-10-24 @ 04:10)  BP: 109/65 (03-10-24 @ 04:10) (93/53 - 116/59)  RR: 18 (03-10-24 @ 04:10)  SpO2: 99% (03-10-24 @ 08:20)          TESTS & MEASUREMENTS:  Weight/BMI      03-08-24 @ 07:01  -  03-09-24 @ 07:00  --------------------------------------------------------  IN: 1067 mL / OUT: 1150 mL / NET: -83 mL    03-09-24 @ 06:01  -  03-10-24 @ 07:00  --------------------------------------------------------  IN: 487 mL / OUT: 1140 mL / NET: -653 mL                            11.3   3.19  )-----------( 51       ( 09 Mar 2024 07:48 )             36.2       INR: 1.25 ratio (03-06-24 @ 06:36)    03-10    142  |  103  |  18  ----------------------------<  86  4.1   |  26  |  0.8    Ca    8.9      10 Mar 2024 05:55  Mg     2.1     03-10    TPro  6.9  /  Alb  3.5  /  TBili  1.0  /  DBili  x   /  AST  12  /  ALT  12  /  AlkPhos  91  03-10    LIVER FUNCTIONS - ( 10 Mar 2024 05:55 )  Alb: 3.5 g/dL / Pro: 6.9 g/dL / ALK PHOS: 91 U/L / ALT: 12 U/L / AST: 12 U/L / GGT: x                 Urinalysis Basic - ( 10 Mar 2024 05:55 )    Color: x / Appearance: x / SG: x / pH: x  Gluc: 86 mg/dL / Ketone: x  / Bili: x / Urobili: x   Blood: x / Protein: x / Nitrite: x   Leuk Esterase: x / RBC: x / WBC x   Sq Epi: x / Non Sq Epi: x / Bacteria: x          Ferritin: 175 ng/mL (03-06-24 @ 06:36)            A1C with Estimated Average Glucose Result: 4.5 % (03-06-24 @ 06:36)          RADIOLOGY, ECG, & ADDITIONAL TESTS:  12 Lead ECG:   Ventricular Rate 93 BPM    Atrial Rate 93 BPM    P-R Interval 144 ms    QRS Duration 90 ms    Q-T Interval 398 ms    QTC Calculation(Bazett) 494 ms    P Axis 81 degrees    R Axis 108 degrees    T Axis 64 degrees    Diagnosis Line Sinus rhythm with frequent and consecutive Premature ventricular complexes  Rightward axis  Left ventricular hypertrophy with repolarization abnormality  Cannot rule out Inferior infarct , age undetermined  Anterior infarct , age undetermined  Abnormal ECG    Confirmedby Wm Sommer ([id number]) on 3/5/2024 7:57:47 PM (03-05-24 @ 15:39)      RECENT DIAGNOSTIC ORDERS:  Complete Blood Count + Automated Diff: 11:00 (03-10-24 @ 10:27)      MEDICATIONS:  MEDICATIONS  (STANDING):  cyanocobalamin 1000 MICROGram(s) Oral daily  enoxaparin Injectable 40 milliGRAM(s) SubCutaneous every 24 hours  furosemide    Tablet 20 milliGRAM(s) Oral daily  influenza  Vaccine (HIGH DOSE) 0.7 milliLiter(s) IntraMuscular once  losartan 25 milliGRAM(s) Oral daily  metoprolol succinate ER 25 milliGRAM(s) Oral daily  potassium chloride  20 mEq/100 mL IVPB 20 milliEquivalent(s) IV Intermittent every 2 hours    MEDICATIONS  (PRN):  guaiFENesin Oral Liquid (Sugar-Free) 100 milliGRAM(s) Oral every 6 hours PRN Cough      HOME MEDICATIONS:      PHYSICAL EXAM:  GENERAL:   CHEST/LUNG:   HEART:   ABDOMEN:   EXTREMITIES:               SONNIYA  79y, Male  Allergy: No Known Allergies    Hospital Day: 5d    Patient seen and examined earlier today.  he stated that he feels better     PMH/PSH:  PAST MEDICAL & SURGICAL HISTORY:  No pertinent past medical history      No significant past surgical history          LAST 24-Hr EVENTS:    VITALS:  T(F): 98.4 (03-10-24 @ 04:10), Max: 98.4 (03-10-24 @ 04:10)  HR: 85 (03-10-24 @ 04:10)  BP: 109/65 (03-10-24 @ 04:10) (93/53 - 116/59)  RR: 18 (03-10-24 @ 04:10)  SpO2: 99% (03-10-24 @ 08:20)          TESTS & MEASUREMENTS:  Weight/BMI      03-08-24 @ 07:01  -  03-09-24 @ 07:00  --------------------------------------------------------  IN: 1067 mL / OUT: 1150 mL / NET: -83 mL    03-09-24 @ 06:01  -  03-10-24 @ 07:00  --------------------------------------------------------  IN: 487 mL / OUT: 1140 mL / NET: -653 mL                                         10.7   3.82  )-----------( 41       ( 10 Mar 2024 11:50 )             34.2         INR: 1.25 ratio (03-06-24 @ 06:36)    03-10    142  |  103  |  18  ----------------------------<  86  4.1   |  26  |  0.8    Ca    8.9      10 Mar 2024 05:55  Mg     2.1     03-10    TPro  6.9  /  Alb  3.5  /  TBili  1.0  /  DBili  x   /  AST  12  /  ALT  12  /  AlkPhos  91  03-10    LIVER FUNCTIONS - ( 10 Mar 2024 05:55 )  Alb: 3.5 g/dL / Pro: 6.9 g/dL / ALK PHOS: 91 U/L / ALT: 12 U/L / AST: 12 U/L / GGT: x                 Urinalysis Basic - ( 10 Mar 2024 05:55 )    Color: x / Appearance: x / SG: x / pH: x  Gluc: 86 mg/dL / Ketone: x  / Bili: x / Urobili: x   Blood: x / Protein: x / Nitrite: x   Leuk Esterase: x / RBC: x / WBC x   Sq Epi: x / Non Sq Epi: x / Bacteria: x          Ferritin: 175 ng/mL (03-06-24 @ 06:36)            A1C with Estimated Average Glucose Result: 4.5 % (03-06-24 @ 06:36)          RADIOLOGY, ECG, & ADDITIONAL TESTS:  12 Lead ECG:   Ventricular Rate 93 BPM    Atrial Rate 93 BPM    P-R Interval 144 ms    QRS Duration 90 ms    Q-T Interval 398 ms    QTC Calculation(Bazett) 494 ms    P Axis 81 degrees    R Axis 108 degrees    T Axis 64 degrees    Diagnosis Line Sinus rhythm with frequent and consecutive Premature ventricular complexes  Rightward axis  Left ventricular hypertrophy with repolarization abnormality  Cannot rule out Inferior infarct , age undetermined  Anterior infarct , age undetermined  Abnormal ECG    Confirmedby Wm Sommer ([id number]) on 3/5/2024 7:57:47 PM (03-05-24 @ 15:39)      RECENT DIAGNOSTIC ORDERS:  Complete Blood Count + Automated Diff: 11:00 (03-10-24 @ 10:27)      MEDICATIONS:  MEDICATIONS  (STANDING):  cyanocobalamin 1000 MICROGram(s) Oral daily  enoxaparin Injectable 40 milliGRAM(s) SubCutaneous every 24 hours  furosemide    Tablet 20 milliGRAM(s) Oral daily  influenza  Vaccine (HIGH DOSE) 0.7 milliLiter(s) IntraMuscular once  losartan 25 milliGRAM(s) Oral daily  metoprolol succinate ER 25 milliGRAM(s) Oral daily  potassium chloride  20 mEq/100 mL IVPB 20 milliEquivalent(s) IV Intermittent every 2 hours    MEDICATIONS  (PRN):  guaiFENesin Oral Liquid (Sugar-Free) 100 milliGRAM(s) Oral every 6 hours PRN Cough      HOME MEDICATIONS:      PHYSICAL EXAM:  On exam  General: awake, alert, NAD, chronic ill appearance  Lungs:  decrease breath sound b/l basal , normal resp effort  Heart: regular ryhthm   Abdomen: soft, non tender non distended  Ext: no edema, can move all  his extremities   R lateral cubital area erythema and swelling ( likely infiltrated iv)

## 2024-03-11 LAB
ALBUMIN SERPL ELPH-MCNC: 3.8 G/DL — SIGNIFICANT CHANGE UP (ref 3.5–5.2)
ALP SERPL-CCNC: 90 U/L — SIGNIFICANT CHANGE UP (ref 30–115)
ALT FLD-CCNC: 11 U/L — SIGNIFICANT CHANGE UP (ref 0–41)
ANION GAP SERPL CALC-SCNC: 13 MMOL/L — SIGNIFICANT CHANGE UP (ref 7–14)
AST SERPL-CCNC: 13 U/L — SIGNIFICANT CHANGE UP (ref 0–41)
BASOPHILS # BLD AUTO: 0.05 K/UL — SIGNIFICANT CHANGE UP (ref 0–0.2)
BASOPHILS NFR BLD AUTO: 1.3 % — HIGH (ref 0–1)
BILIRUB SERPL-MCNC: 1.1 MG/DL — SIGNIFICANT CHANGE UP (ref 0.2–1.2)
BUN SERPL-MCNC: 18 MG/DL — SIGNIFICANT CHANGE UP (ref 10–20)
CALCIUM SERPL-MCNC: 9.3 MG/DL — SIGNIFICANT CHANGE UP (ref 8.4–10.5)
CHLORIDE SERPL-SCNC: 98 MMOL/L — SIGNIFICANT CHANGE UP (ref 98–110)
CO2 SERPL-SCNC: 28 MMOL/L — SIGNIFICANT CHANGE UP (ref 17–32)
CREAT SERPL-MCNC: 0.7 MG/DL — SIGNIFICANT CHANGE UP (ref 0.7–1.5)
EGFR: 94 ML/MIN/1.73M2 — SIGNIFICANT CHANGE UP
EOSINOPHIL # BLD AUTO: 0.02 K/UL — SIGNIFICANT CHANGE UP (ref 0–0.7)
EOSINOPHIL NFR BLD AUTO: 0.5 % — SIGNIFICANT CHANGE UP (ref 0–8)
GLUCOSE SERPL-MCNC: 80 MG/DL — SIGNIFICANT CHANGE UP (ref 70–99)
HCT VFR BLD CALC: 38.9 % — LOW (ref 42–52)
HGB BLD-MCNC: 12.1 G/DL — LOW (ref 14–18)
IMM GRANULOCYTES NFR BLD AUTO: 0.8 % — HIGH (ref 0.1–0.3)
LYMPHOCYTES # BLD AUTO: 1.92 K/UL — SIGNIFICANT CHANGE UP (ref 1.2–3.4)
LYMPHOCYTES # BLD AUTO: 48 % — SIGNIFICANT CHANGE UP (ref 20.5–51.1)
MAGNESIUM SERPL-MCNC: 2.4 MG/DL — SIGNIFICANT CHANGE UP (ref 1.8–2.4)
MCHC RBC-ENTMCNC: 27.2 PG — SIGNIFICANT CHANGE UP (ref 27–31)
MCHC RBC-ENTMCNC: 31.1 G/DL — LOW (ref 32–37)
MCV RBC AUTO: 87.4 FL — SIGNIFICANT CHANGE UP (ref 80–94)
MONOCYTES # BLD AUTO: 0.57 K/UL — SIGNIFICANT CHANGE UP (ref 0.1–0.6)
MONOCYTES NFR BLD AUTO: 14.3 % — HIGH (ref 1.7–9.3)
NEUTROPHILS # BLD AUTO: 1.41 K/UL — SIGNIFICANT CHANGE UP (ref 1.4–6.5)
NEUTROPHILS NFR BLD AUTO: 35.1 % — LOW (ref 42.2–75.2)
NRBC # BLD: 0 /100 WBCS — SIGNIFICANT CHANGE UP (ref 0–0)
PLATELET # BLD AUTO: 40 K/UL — LOW (ref 130–400)
PMV BLD: SIGNIFICANT CHANGE UP (ref 7.4–10.4)
POTASSIUM SERPL-MCNC: 3.8 MMOL/L — SIGNIFICANT CHANGE UP (ref 3.5–5)
POTASSIUM SERPL-SCNC: 3.8 MMOL/L — SIGNIFICANT CHANGE UP (ref 3.5–5)
PROT SERPL-MCNC: 7.5 G/DL — SIGNIFICANT CHANGE UP (ref 6–8)
RBC # BLD: 4.45 M/UL — LOW (ref 4.7–6.1)
RBC # FLD: 18.8 % — HIGH (ref 11.5–14.5)
SODIUM SERPL-SCNC: 139 MMOL/L — SIGNIFICANT CHANGE UP (ref 135–146)
TM INTERPRETATION: SIGNIFICANT CHANGE UP
WBC # BLD: 4 K/UL — LOW (ref 4.8–10.8)
WBC # FLD AUTO: 4 K/UL — LOW (ref 4.8–10.8)

## 2024-03-11 PROCEDURE — 99222 1ST HOSP IP/OBS MODERATE 55: CPT

## 2024-03-11 PROCEDURE — 99232 SBSQ HOSP IP/OBS MODERATE 35: CPT

## 2024-03-11 RX ORDER — SACUBITRIL AND VALSARTAN 24; 26 MG/1; MG/1
1 TABLET, FILM COATED ORAL
Refills: 0 | Status: DISCONTINUED | OUTPATIENT
Start: 2024-03-11 | End: 2024-03-12

## 2024-03-11 RX ORDER — CHLORHEXIDINE GLUCONATE 213 G/1000ML
1 SOLUTION TOPICAL
Refills: 0 | Status: DISCONTINUED | OUTPATIENT
Start: 2024-03-11 | End: 2024-03-15

## 2024-03-11 RX ADMIN — Medication 40 MILLIGRAM(S): at 05:15

## 2024-03-11 RX ADMIN — Medication 25 MILLIGRAM(S): at 05:15

## 2024-03-11 RX ADMIN — Medication 100 MILLIGRAM(S): at 10:51

## 2024-03-11 RX ADMIN — Medication 100 MILLIGRAM(S): at 22:47

## 2024-03-11 RX ADMIN — LOSARTAN POTASSIUM 25 MILLIGRAM(S): 100 TABLET, FILM COATED ORAL at 05:15

## 2024-03-11 RX ADMIN — ENOXAPARIN SODIUM 40 MILLIGRAM(S): 100 INJECTION SUBCUTANEOUS at 11:27

## 2024-03-11 RX ADMIN — SACUBITRIL AND VALSARTAN 1 TABLET(S): 24; 26 TABLET, FILM COATED ORAL at 17:20

## 2024-03-11 RX ADMIN — PREGABALIN 1000 MICROGRAM(S): 225 CAPSULE ORAL at 11:26

## 2024-03-11 NOTE — PROGRESS NOTE ADULT - SUBJECTIVE AND OBJECTIVE BOX
24H events:    Patient is a 79y old Male who presents with a chief complaint of shortness of breath (11 Mar 2024 09:13)    Primary diagnosis of SOB (shortness of breath)        Today is hospital day 6d. This morning patient was seen and examined at bedside, resting comfortably in bed.    No acute or major events overnight.    Code Status:    Family communication:  Contact date:  Name of person contacted:  Relationship to patient:  Communication details:  What matters most:    PAST MEDICAL & SURGICAL HISTORY  No pertinent past medical history    No significant past surgical history      SOCIAL HISTORY:  Social History:      ALLERGIES:  No Known Allergies    MEDICATIONS:  STANDING MEDICATIONS  cyanocobalamin 1000 MICROGram(s) Oral daily  enoxaparin Injectable 40 milliGRAM(s) SubCutaneous every 24 hours  furosemide    Tablet 40 milliGRAM(s) Oral daily  influenza  Vaccine (HIGH DOSE) 0.7 milliLiter(s) IntraMuscular once  losartan 25 milliGRAM(s) Oral daily  metoprolol succinate ER 25 milliGRAM(s) Oral daily  potassium chloride  20 mEq/100 mL IVPB 20 milliEquivalent(s) IV Intermittent every 2 hours    PRN MEDICATIONS  guaiFENesin Oral Liquid (Sugar-Free) 100 milliGRAM(s) Oral every 6 hours PRN    VITALS:   T(F): 97.8  HR: 53  BP: 122/53  RR: 18  SpO2: --    PHYSICAL EXAM:  GENERAL:   ( x ) NAD, lying in bed comfortably     (  ) obtunded     (  ) lethargic     (  ) somnolent    HEAD:   ( x ) Atraumatic     (  ) hematoma     (  ) laceration (specify location:       )     NECK:  ( x ) Supple     (  ) neck stiffness     (  ) nuchal rigidity     (  )  no JVD     (  ) JVD present ( -- cm)    HEART:  Rate -->     ( x ) normal rate     (  ) bradycardic     (  ) tachycardic  Rhythm -->     ( x ) regular     (  ) regularly irregular     (  ) irregularly irregular  Murmurs -->     ( x ) normal s1s2     (  ) systolic murmur     (  ) diastolic murmur     (  ) continuous murmur      (  ) S3 present     (  ) S4 present    LUNGS:   ( x )Unlabored respirations     (  ) tachypnea  ( x ) B/L air entry     (  ) decreased breath sounds in:  (location     )    (  ) no adventitious sound     (  ) crackles     (  ) wheezing      (  ) rhonchi      (specify location:       )  (  ) chest wall tenderness (specify location:       )    ABDOMEN:   ( x ) Soft     (  ) tense   |   ( x ) nondistended     (  ) distended   |   ( x ) +BS     (  ) hypoactive bowel sounds     (  ) hyperactive bowel sounds  ( x ) nontender     (  ) RUQ tenderness     (  ) RLQ tenderness     (  ) LLQ tenderness     (  ) epigastric tenderness     (  ) diffuse tenderness  (  ) Splenomegaly      (  ) Hepatomegaly      (  ) Jaundice     (  ) ecchymosis     EXTREMITIES: RUE with phlebitis around anterior forearm  (  ) Normal     (  ) Rash     (  ) ecchymosis     (  ) varicose veins      (  ) pitting edema     (  ) non-pitting edema   (  ) ulceration     (  ) gangrene:     (location:     )    NERVOUS SYSTEM:    ( x ) A&Ox3     (  ) confused     (  ) lethargic  CN II-XII:     (  ) Intact     (  ) deficits found     (Specify:     )   Upper extremities:     (  ) no sensorimotor deficits     (  ) weakness     (  ) loss of proprioception/vibration     (  ) loss of touch/temperature (specify:    )  Lower extremities:     (  ) no sensorimotor deficits     (  ) weakness     (  ) loss of proprioception/vibration     (  ) loss of touch/temperature (specify:    )    SKIN:   ( x ) No rashes or lesions     (  ) maculopapular rash     (  ) pustules     (  ) vesicles     (  ) ulcer     (  ) ecchymosis     (specify location:     )    AMPAC score:    (  ) Indwelling Tadeo Catheter:   Date insterted:    Reason (  ) Critical illness     (  ) urinary retention    (  ) Accurate Ins/Outs Monitoring     (  ) CMO patient    (  ) Central Line:   Date inserted:  Location: (  ) Right IJ     (  ) Left IJ     (  ) Right Fem     (  ) Left Fem    (  ) SPC        (  ) pigtail       (  ) PEG tube       (  ) colostomy       (  ) jejunostomy  (  ) U-Dall    LABS:                        12.1   4.00  )-----------( 40       ( 11 Mar 2024 07:48 )             38.9     03-11    139  |  98  |  18  ----------------------------<  80  3.8   |  28  |  0.7    Ca    9.3      11 Mar 2024 07:48  Mg     2.4     03-11    TPro  7.5  /  Alb  3.8  /  TBili  1.1  /  DBili  x   /  AST  13  /  ALT  11  /  AlkPhos  90  03-11      Urinalysis Basic - ( 11 Mar 2024 07:48 )    Color: x / Appearance: x / SG: x / pH: x  Gluc: 80 mg/dL / Ketone: x  / Bili: x / Urobili: x   Blood: x / Protein: x / Nitrite: x   Leuk Esterase: x / RBC: x / WBC x   Sq Epi: x / Non Sq Epi: x / Bacteria: x                RADIOLOGY:

## 2024-03-11 NOTE — PROGRESS NOTE ADULT - ASSESSMENT
78 yo with no PMH presents for eval of new onset shortness of breath for the past few days. Patient reported that he started feeling SOB for the past few days at rest and on exertion and it was progressively worsening where he couldn't sleep for the past 2 nights bcz of orthopnea. He reported associated bilateral ankle swelling.    #Shortness of breath 2/2 to volume overload due to new onset CHF   - hemodynamically stable   - spo2 98% on 3.5 L NC - now on 2 L  - trop 42 -> 42 -> 48, pro-BN [id number]  - VBG: lactate 2.8, pH 7.35, pCO2 43    - EKG: sinus with PVCs, LVH   - CXR bilateral basilar opacities, hilar congestion, increased vascular markings    - switched lasix 40 mg IV BID to PO lasix 40 QD  - strict I&Os  - Echo reduced EF 25-30% - cardio recommending optimizing GDMT not a good candidate for ischemic workup, c/w metoprolol and losartan, if BP stable ok to start entresto 24-26mg per cardio  - covid/flu/RSV negative  - keep K > 4 and Mg > 2   - EP consulted - Once work up  of pancytopenia as per Hem-onc is performed, will need Ischemic w-up- prefer to be done as inpatient, consider MRI once w/u done. will benefit from WCD on discharge due to NSVTs and risk of arrythmia      #Pancytopenia  #CT Chest with lung nodules  - RUQ sono with No definite liver cirrhosis. Liver echogenic foci measuring up to 2 cm, likely hemangiomas  - WBC 2.92, Hb 11.6, MCV 88.3, Plt 63, ANC 1250   - no B symptoms, no hx of alcohol use   - retic count 159, , iron studies, B12, folate, dino test, TSH noted  - HIV, hep B and C profile, INR noted  - Heme/onc consult recs appreciated - c/w B12 supplements  - repeat dino +  - f/u MMA level, flow cytometry  - f/u heme/onc for bone marrow bx  - lung nodules on CT chest and CTAP - pulm cs for suspicious nodules, concern of malignancy, but needs HF to be managed before bx, recommend f/u outpatient for PET or repeat CT scan in 4-6 weeks and PFTs. Oxygen goal 92-95%.      #DVT ppx: lovenox - resumed per heme/onc as long as plts >30 and no bleeding  #GI ppx: NA   #Diet: DASH   #Activity: as tolerated, PT consult 80 yo with no PMH presents for eval of new onset shortness of breath for the past few days. Patient reported that he started feeling SOB for the past few days at rest and on exertion and it was progressively worsening where he couldn't sleep for the past 2 nights bcz of orthopnea. He reported associated bilateral ankle swelling.    #Shortness of breath 2/2 to volume overload due to new onset CHF   - hemodynamically stable   - spo2 98% on 3.5 L NC - now on 2 L  - trop 42 -> 42 -> 48, pro-BN [id number]  - VBG: lactate 2.8, pH 7.35, pCO2 43    - EKG: sinus with PVCs, LVH   - CXR bilateral basilar opacities, hilar congestion, increased vascular markings    - switched lasix 40 mg IV BID to PO lasix 40 QD  - strict I&Os  - Echo reduced EF 25-30% - cardio recommending optimizing GDMT not a good candidate for ischemic workup, c/w metoprolol, BP stable ok to start entresto 24-26mg per cardio, dc'd losartan  - covid/flu/RSV negative  - keep K > 4 and Mg > 2   - EP consulted - Once work up  of pancytopenia as per Hem-onc is performed, will need Ischemic w-up- prefer to be done as inpatient, consider MRI once w/u done. will benefit from WCD on discharge due to NSVTs and risk of arrythmia      #Pancytopenia  #CT Chest with lung nodules  - RUQ sono with No definite liver cirrhosis. Liver echogenic foci measuring up to 2 cm, likely hemangiomas  - WBC 2.92, Hb 11.6, MCV 88.3, Plt 63, ANC 1250   - no B symptoms, no hx of alcohol use   - retic count 159, , iron studies, B12, folate, dino test, TSH noted  - HIV, hep B and C profile, INR noted  - Heme/onc consult recs appreciated - c/w B12 supplements  - repeat dino +  - f/u MMA level, flow cytometry  - f/u heme/onc for bone marrow bx  - lung nodules on CT chest and CTAP - pulm cs for suspicious nodules, concern of malignancy, but needs HF to be managed before bx, recommend f/u outpatient for PET or repeat CT scan in 4-6 weeks and PFTs. Oxygen goal 92-95%.      #DVT ppx: lovenox - resumed per heme/onc as long as plts >30 and no bleeding  #GI ppx: NA   #Diet: DASH   #Activity: as tolerated, PT consult 80 yo with no PMH presents for eval of new onset shortness of breath for the past few days. Patient reported that he started feeling SOB for the past few days at rest and on exertion and it was progressively worsening where he couldn't sleep for the past 2 nights bcz of orthopnea. He reported associated bilateral ankle swelling.    #Shortness of breath 2/2 to volume overload due to new onset CHF   - hemodynamically stable   - spo2 98% on 3.5 L NC - now on 2 L  - trop 42 -> 42 -> 48, pro-BN [id number]  - VBG: lactate 2.8, pH 7.35, pCO2 43    - EKG: sinus with PVCs, LVH   - CXR bilateral basilar opacities, hilar congestion, increased vascular markings    - switched lasix 40 mg IV BID to PO lasix 40 QD  - strict I&Os  - Echo reduced EF 25-30% - cardio recommending optimizing GDMT not a good candidate for ischemic workup, c/w metoprolol, BP stable ok to start entresto 24-26mg per cardio, dc'd losartan  - covid/flu/RSV negative  - keep K > 4 and Mg > 2   - EP consulted - Once work up  of pancytopenia as per Hem-onc is performed, will need Ischemic w-up- prefer to be done as inpatient, consider MRI once w/u done. will benefit from WCD on discharge due to NSVTs and risk of arrythmia      #Pancytopenia  #CT Chest with lung nodules  - RUQ sono with No definite liver cirrhosis. Liver echogenic foci measuring up to 2 cm, likely hemangiomas  - WBC 2.92, Hb 11.6, MCV 88.3, Plt 63, ANC 1250   - no B symptoms, no hx of alcohol use   - retic count 159, , iron studies, B12, folate, dino test, TSH noted  - HIV, hep B and C profile, INR noted  - Heme/onc consult recs appreciated - c/w B12 supplements  - repeat dino +, Hb stable, rare schistocytosis on smear, no need to treat hemolytic anemia at this time  - f/u MMA level, flow cytometry  - f/u heme/onc for bone marrow bx  - lung nodules on CT chest and CTAP - pulm cs for suspicious nodules, concern of malignancy, but needs HF to be managed before bx, recommend f/u outpatient for PET or repeat CT scan in 4-6 weeks and PFTs. Oxygen goal 92-95%.      #DVT ppx: lovenox - resumed per heme/onc as long as plts >30 and no bleeding  #GI ppx: NA   #Diet: DASH   #Activity: as tolerated, PT consult 78 yo with no PMH presents for eval of new onset shortness of breath for the past few days. Patient reported that he started feeling SOB for the past few days at rest and on exertion and it was progressively worsening where he couldn't sleep for the past 2 nights bcz of orthopnea. He reported associated bilateral ankle swelling.    #Shortness of breath 2/2 to volume overload due to new onset CHF   - hemodynamically stable   - spo2 98% on 3.5 L NC - now on 2 L  - trop 42 -> 42 -> 48, pro-BN [id number]  - VBG: lactate 2.8, pH 7.35, pCO2 43    - EKG: sinus with PVCs, LVH   - CXR bilateral basilar opacities, hilar congestion, increased vascular markings    - switched lasix 40 mg IV BID to PO lasix 40 QD  - strict I&Os  - Echo reduced EF 25-30% - cardio recommending optimizing GDMT not a good candidate for ischemic workup, c/w metoprolol, BP stable ok to start entresto 24-26mg per cardio, dc'd losartan  - covid/flu/RSV negative  - keep K > 4 and Mg > 2   - EP consulted - Once work up  of pancytopenia as per Hem-onc is performed, will need Ischemic w-up- prefer to be done as inpatient, consider MRI once w/u done. will benefit from WCD on discharge due to NSVTs and risk of arrythmia      #Pancytopenia  #CT Chest with lung nodules  - RUQ sono with No definite liver cirrhosis. Liver echogenic foci measuring up to 2 cm, likely hemangiomas  - WBC 2.92, Hb 11.6, MCV 88.3, Plt 63, ANC 1250   - no B symptoms, no hx of alcohol use   - retic count 159, , iron studies, B12, folate, dino test, TSH noted  - HIV, hep B and C profile, INR noted  - Heme/onc consult recs appreciated - c/w B12 supplements  - repeat dino +, Hb stable, rare schistocytosis on smear, no need to treat hemolytic anemia at this time  - f/u MMA level, flow cytometry  - f/u heme/onc for bone marrow bx  - lung nodules on CT chest and CTAP - pulm cs for suspicious nodules, concern of malignancy, but needs HF to be managed before bx, recommend f/u outpatient for PET or repeat CT scan in 4-6 weeks and PFTs. Oxygen goal 92-95%.      #RUE superficial phlebitis  - superficial phlebitis due to IV infitration  - conservative management for now  - will monitor        #DVT ppx: lovenox - resumed per heme/onc as long as plts >30 and no bleeding  #GI ppx: NA   #Diet: DASH   #Activity: as tolerated, PT consult

## 2024-03-11 NOTE — PROGRESS NOTE ADULT - ATTENDING COMMENTS
My note supersedes all residents notes that I sign, My correction for their notes are in my notes   the patient nices at bedside , feels frustrated for unclear plan as per her,     I acknowledged her frustration and discussed with her in details the whole current medical condition , I also showed her the labs and imaging and echo reports as the patient gave her the permission and I explained to her the findings  On exam  General: awake, alert, NAD, chronic ill appearance  Lungs:  decrease breath sound b/l basal , normal resp effort  Heart: regular ryhthm   Abdomen: soft, non tender non distended  Ext: no edema, can move all  his extremities , R cubital area less tender and less red     80 yo M with no significant PMH presents to ED for worsening BARRIENTOS.     Acute hypoxic respiratory failure 2/2 pulmonary edema 2/2 suspected new onset CHF  - CXR Bibasal pleural-parenchymal opacities.  CXR today Worsening opacifications, CHF.  Pt symptoms improving   - proBNP 80790  - monitor daily weight, strict I & O, Low Na diet with fluid restriction.   - monitor on Telemetry.   troponin 42--> 42--> 48   no active chest pain and the sob improved   Echo Left ventricular ejection fraction, by visual estimation, is25 to 30%.   Dilatation of the ascending aorta.  s/p iv lasix ,c/w  lasix po to 40 daily   cardiology consult appreciated-patient is not good candidate for aggressive cardiac workup at this point  will treat with GDMT for now as BP tolerat  metoprolol started , losartan switched to Entresto as blood pressure improved   EP consulted - Once work up  of pancytopenia as per Hem-onc is performed, will need Ischemic w-up- prefer to be done as inpatient  Consider doing MRI heart once w-up is done  Will benefit from WCD. Discussed risk of Ventricular arrhythmia and risk of SCD w/non-compliance. Agreeable.  Tele Recall  EP when ready to be discharged.  cardiology f/u requested as per the patient request as well     Pancytopenia  RUQ sono with No definite liver cirrhosis. Liver echogenic foci measuring up to 2 cm, likely hemangiomas.  Hematology on board   peripheral smear: rare schistocytes giant platelets/platelet clumping, no spherocytosis,  f/u  MMA level  US spleen  splenomegaly  follow up MMA level  - can start patient on oral Vitamin B12   - follow up flow cytometry   - repeat dino positive   - if above work up is non-revealing, will need inpatient bone marrow biopsy   B12 po started as per hem   CTAP NO LAD but noted lung lesions - pulmonary consult  appreciated - Recommend F/u outpatient for PET / epeat CT scan in 4-6 weeks.  Oxygen goal 92-95%. When discharged he should follow up with pulmonary for PFTs  incentive spirometry      I discussed with Hem/onc today as the patient agreed to get the BM biobsy -they will follow         DVT ppx: lovenox resumed - as per hem verbal reccs can give dvt ppx as long as Plt > 30 and no bleeding     pending; cardiology follow up requested - , hematology for BM biopsy , K level and platelet / EP/ pulmonary   PT        discussed with the patient and his niece as mentioned above  in details I also discussed with patient experience

## 2024-03-11 NOTE — CONSULT NOTE ADULT - ATTENDING COMMENTS
events noted, ex smoker, presented for SOB, last time saw PMD, 2019, CHEST CT noted, echo reviewed, weight loss, cough, spoke with patient well aware of nodules, op PET scan/ repeat ct, neb as needed, pft
Patient seen and examined at bedside  Functional 79 year old male patient with no known cardiac hx , presented to hospital for BARRIENTOS found to have HFrEF (25-30%) with moderate MR, also severe thrombocytopenia  given low platelets , patient is not good candidate for aggressive cardiac workup at this point  will treat with GDMT for now as BP tolerate  Hematology workup   other plan as outlined above.
seen/ examined w/ hemonc fellow; note reviewed;case discussed; agree w/plan    smear reviewed:  WBC; no blasts and no immature abnormal cells  RBC;no schistocytes; (+) spherocytes; (+) polychromasia  platelets : estimated <100K; (+) giant and large platelets    f/u recommendations as per the fellow note

## 2024-03-11 NOTE — PHARMACOTHERAPY INTERVENTION NOTE - COMMENTS
Patient with reduced EF= 25-30%, was started on losartan 25 mg po daily. SBP was originally on the low side. SBP has now been continently > 100 mm Hg would recommend considering switching losartan to entresto 24/26 mg po BID and titrating as blood pressure allows.

## 2024-03-11 NOTE — CONSULT NOTE ADULT - ASSESSMENT
IMPRESSION    Acute hypoxemic respiratory failure  Volume overload  New HFrEF  B/l pulmonary nodules      RECOMMENDATIONS     These nodules are high risk for malignancy, but his heart failure should be optimized before biopsy.  Recommend maximise cardiac status (cardiology and EP are following).  F/u outpatient for PET or repeat CT scan in 4-6 weeks.  Oxygen goal 92-95%.  DVT PPX.  When discharged he should follow up with pulmonary for PFTs.  Recall PRN. IMPRESSION    Acute hypoxemic respiratory failure  Volume overload  New HFrEF  COPD ex smoker  B/l pulmonary nodules      RECOMMENDATIONS     Recommend maximise cardiac status (cardiology and EP are following).  F/u outpatient for PET / epeat CT scan in 4-6 weeks.  Oxygen goal 92-95%.  DVT PPX.  When discharged he should follow up with pulmonary for PFTs.  Recall PRN.

## 2024-03-11 NOTE — CONSULT NOTE ADULT - SUBJECTIVE AND OBJECTIVE BOX
Patient is a 79y old  Male who presents with a chief complaint of shortness of breath (10 Mar 2024 13:00)      HPI:  78 yo with no PMH presents for eval of new onset shortness of breath for the past few days. Patient reported that he started feeling SOB for the past few days at rest and on exertion and it was progressively worsening where he couldn't sleep for the past 2 nights bcz of orthopnea. He reported associated bilateral ankle swelling. Denies chest pain, palpitations, fever, chills, abdominal pain. This is the first time he experiences such sxs. He is usually an active man, never felt SOB or chest pain on exertion or climbing up the stairs. No known sick contacts, no recent travel, ex heavy smoker ( 1 pack per day > 30 yrs, stopped 8 yrs ago ), no alcohol use, no recent weight loss or night sweats. He did not follow u with a PCP for years.     Vitals: T 97.6, HR 75, /76, spo2 98% on 3.5 L NC   Labs: WBC 2.92, Hb 11.6, MCV 88.3, Plt 63, ANC 1250   trop 42 --. 42, K 3.4, Tb 1.8, pro-BN [id number]  VBG: lactate 2.8, pH 7.35, pCO2 43    EKG: sinus with PVCs, LVH   CXR bilateral basilar opacities, hilar congestion, increased vascular markings     s/p lasix 40 mg IV once in ED     Patient admitted for workup of new onset CHF and pancytopenia  (05 Mar 2024 20:14)      PAST MEDICAL & SURGICAL HISTORY:  No pertinent past medical history      No significant past surgical history          SOCIAL HX:   Smoking            40 pack years, quit 8 years ago    FAMILY HISTORY:  No pertinent family history in first degree relatives    .  No cardiovascular or pulmonary family history     REVIEW OF SYSTEMS:    All ROS are negative except per HPI     Allergies    No Known Allergies    Intolerances          PHYSICAL EXAM  Vital Signs Last 24 Hrs  T(C): 35.9 (11 Mar 2024 04:40), Max: 36.8 (10 Mar 2024 12:31)  T(F): 96.6 (11 Mar 2024 04:40), Max: 98.2 (10 Mar 2024 12:31)  HR: 82 (11 Mar 2024 04:40) (81 - 85)  BP: 126/75 (11 Mar 2024 04:40) (108/63 - 126/75)  BP(mean): --  RR: 18 (11 Mar 2024 04:40) (18 - 18)  SpO2: --        CONSTITUTIONAL:  NAD    ENT:   Airway patent,       EYES:   Clear bilaterally,   pupils equal,   round and reactive to light.    CARDIAC:   Normal rate,   regular rhythm.        RESPIRATORY:   No wheezing  Nnormal chest expansion  Not tachypneic,  No use of accessory muscles    GASTROINTESTINAL:  Abdomen soft,   non-tender,   no guarding,   + BS    MUSCULOSKELETAL:   no clubbing, cyanosis    NEUROLOGICAL:   Alert and oriented     SKIN:   Skin normal color for race,             LABS:                          10.7   3.82  )-----------( 41       ( 10 Mar 2024 11:50 )             34.2                                               03-10    142  |  103  |  18  ----------------------------<  86  4.1   |  26  |  0.8    Ca    8.9      10 Mar 2024 05:55  Mg     2.1     03-10    TPro  6.9  /  Alb  3.5  /  TBili  1.0  /  DBili  x   /  AST  12  /  ALT  12  /  AlkPhos  91  03-10                                             Urinalysis Basic - ( 10 Mar 2024 05:55 )    Color: x / Appearance: x / SG: x / pH: x  Gluc: 86 mg/dL / Ketone: x  / Bili: x / Urobili: x   Blood: x / Protein: x / Nitrite: x   Leuk Esterase: x / RBC: x / WBC x   Sq Epi: x / Non Sq Epi: x / Bacteria: x                                                  LIVER FUNCTIONS - ( 10 Mar 2024 05:55 )  Alb: 3.5 g/dL / Pro: 6.9 g/dL / ALK PHOS: 91 U/L / ALT: 12 U/L / AST: 12 U/L / GGT: x                                                                                                MEDICATIONS  (STANDING):  cyanocobalamin 1000 MICROGram(s) Oral daily  enoxaparin Injectable 40 milliGRAM(s) SubCutaneous every 24 hours  furosemide    Tablet 40 milliGRAM(s) Oral daily  influenza  Vaccine (HIGH DOSE) 0.7 milliLiter(s) IntraMuscular once  losartan 25 milliGRAM(s) Oral daily  metoprolol succinate ER 25 milliGRAM(s) Oral daily  potassium chloride  20 mEq/100 mL IVPB 20 milliEquivalent(s) IV Intermittent every 2 hours    MEDICATIONS  (PRN):  guaiFENesin Oral Liquid (Sugar-Free) 100 milliGRAM(s) Oral every 6 hours PRN Cough      X-Rays reviewed:    CXR interpreted by me: Patient is a 79y old  Male who presents with a chief complaint of shortness of breath (10 Mar 2024 13:00)      HPI:  80 yo with no PMH presents for eval of new onset shortness of breath for the past few days. Patient reported that he started feeling SOB for the past few days at rest and on exertion and it was progressively worsening where he couldn't sleep for the past 2 nights bcz of orthopnea. He reported associated bilateral ankle swelling. Denies chest pain, palpitations, fever, chills, abdominal pain. This is the first time he experiences such sxs. He is usually an active man, never felt SOB or chest pain on exertion or climbing up the stairs. No known sick contacts, no recent travel, ex heavy smoker ( 1 pack per day > 30 yrs, stopped 8 yrs ago ), no alcohol use, no recent weight loss or night sweats. He did not follow u with a PCP for years.     Vitals: T 97.6, HR 75, /76, spo2 98% on 3.5 L NC   Labs: WBC 2.92, Hb 11.6, MCV 88.3, Plt 63, ANC 1250   trop 42 --. 42, K 3.4, Tb 1.8, pro-BN [id number]  VBG: lactate 2.8, pH 7.35, pCO2 43    EKG: sinus with PVCs, LVH   CXR bilateral basilar opacities, hilar congestion, increased vascular markings     s/p lasix 40 mg IV once in ED     Patient admitted for workup of new onset CHF and pancytopenia  (05 Mar 2024 20:14), echo/ chest ct done called to evaluate, on NC, cough, weight loss      PAST MEDICAL & SURGICAL HISTORY:  No pertinent past medical history      No significant past surgical history          SOCIAL HX:   Smoking            40 pack years, quit 8 years ago    FAMILY HISTORY:  No pertinent family history in first degree relatives    .  No cardiovascular or pulmonary family history     REVIEW OF SYSTEMS:    All ROS are negative except per HPI     Allergies    No Known Allergies    Intolerances          PHYSICAL EXAM  Vital Signs Last 24 Hrs  T(C): 35.9 (11 Mar 2024 04:40), Max: 36.8 (10 Mar 2024 12:31)  T(F): 96.6 (11 Mar 2024 04:40), Max: 98.2 (10 Mar 2024 12:31)  HR: 82 (11 Mar 2024 04:40) (81 - 85)  BP: 126/75 (11 Mar 2024 04:40) (108/63 - 126/75)  BP(mean): --  RR: 18 (11 Mar 2024 04:40) (18 - 18)  SpO2: --        CONSTITUTIONAL:  ill looking    ENT:   Airway patent,       EYES:   Clear bilaterally,   pupils equal,   round and reactive to light.    CARDIAC:   DALE 2.6    RESPIRATORY:   DEC BS diffusely    GASTROINTESTINAL:  Abdomen soft,   non-tender,   no guarding,   + BS    MUSCULOSKELETAL:   no clubbing, cyanosis    NEUROLOGICAL:   Alert and oriented     SKIN:   Skin normal color for race,             LABS:                          10.7   3.82  )-----------( 41       ( 10 Mar 2024 11:50 )             34.2                                               03-10    142  |  103  |  18  ----------------------------<  86  4.1   |  26  |  0.8    Ca    8.9      10 Mar 2024 05:55  Mg     2.1     03-10    TPro  6.9  /  Alb  3.5  /  TBili  1.0  /  DBili  x   /  AST  12  /  ALT  12  /  AlkPhos  91  03-10                                             Urinalysis Basic - ( 10 Mar 2024 05:55 )    Color: x / Appearance: x / SG: x / pH: x  Gluc: 86 mg/dL / Ketone: x  / Bili: x / Urobili: x   Blood: x / Protein: x / Nitrite: x   Leuk Esterase: x / RBC: x / WBC x   Sq Epi: x / Non Sq Epi: x / Bacteria: x                                                  LIVER FUNCTIONS - ( 10 Mar 2024 05:55 )  Alb: 3.5 g/dL / Pro: 6.9 g/dL / ALK PHOS: 91 U/L / ALT: 12 U/L / AST: 12 U/L / GGT: x                                                                                                MEDICATIONS  (STANDING):  cyanocobalamin 1000 MICROGram(s) Oral daily  enoxaparin Injectable 40 milliGRAM(s) SubCutaneous every 24 hours  furosemide    Tablet 40 milliGRAM(s) Oral daily  influenza  Vaccine (HIGH DOSE) 0.7 milliLiter(s) IntraMuscular once  losartan 25 milliGRAM(s) Oral daily  metoprolol succinate ER 25 milliGRAM(s) Oral daily  potassium chloride  20 mEq/100 mL IVPB 20 milliEquivalent(s) IV Intermittent every 2 hours    MEDICATIONS  (PRN):  guaiFENesin Oral Liquid (Sugar-Free) 100 milliGRAM(s) Oral every 6 hours PRN Cough    Chest ct noted

## 2024-03-12 ENCOUNTER — RESULT REVIEW (OUTPATIENT)
Age: 79
End: 2024-03-12

## 2024-03-12 PROBLEM — Z00.00 ENCOUNTER FOR PREVENTIVE HEALTH EXAMINATION: Status: ACTIVE | Noted: 2024-03-12

## 2024-03-12 PROBLEM — Z78.9 OTHER SPECIFIED HEALTH STATUS: Chronic | Status: ACTIVE | Noted: 2024-03-05

## 2024-03-12 LAB
ALBUMIN SERPL ELPH-MCNC: 3.3 G/DL — LOW (ref 3.5–5.2)
ALP SERPL-CCNC: 76 U/L — SIGNIFICANT CHANGE UP (ref 30–115)
ALT FLD-CCNC: 12 U/L — SIGNIFICANT CHANGE UP (ref 0–41)
ANION GAP SERPL CALC-SCNC: 7 MMOL/L — SIGNIFICANT CHANGE UP (ref 7–14)
AST SERPL-CCNC: 12 U/L — SIGNIFICANT CHANGE UP (ref 0–41)
BASOPHILS # BLD AUTO: 0 K/UL — SIGNIFICANT CHANGE UP (ref 0–0.2)
BASOPHILS # BLD AUTO: 0.04 K/UL — SIGNIFICANT CHANGE UP (ref 0–0.2)
BASOPHILS NFR BLD AUTO: 0 % — SIGNIFICANT CHANGE UP (ref 0–1)
BASOPHILS NFR BLD AUTO: 1.3 % — HIGH (ref 0–1)
BILIRUB SERPL-MCNC: 0.9 MG/DL — SIGNIFICANT CHANGE UP (ref 0.2–1.2)
BUN SERPL-MCNC: 18 MG/DL — SIGNIFICANT CHANGE UP (ref 10–20)
CALCIUM SERPL-MCNC: 9.1 MG/DL — SIGNIFICANT CHANGE UP (ref 8.4–10.4)
CHLORIDE SERPL-SCNC: 100 MMOL/L — SIGNIFICANT CHANGE UP (ref 98–110)
CO2 SERPL-SCNC: 29 MMOL/L — SIGNIFICANT CHANGE UP (ref 17–32)
CREAT SERPL-MCNC: 0.7 MG/DL — SIGNIFICANT CHANGE UP (ref 0.7–1.5)
EGFR: 94 ML/MIN/1.73M2 — SIGNIFICANT CHANGE UP
EOSINOPHIL # BLD AUTO: 0.01 K/UL — SIGNIFICANT CHANGE UP (ref 0–0.7)
EOSINOPHIL # BLD AUTO: 0.02 K/UL — SIGNIFICANT CHANGE UP (ref 0–0.7)
EOSINOPHIL NFR BLD AUTO: 0.3 % — SIGNIFICANT CHANGE UP (ref 0–8)
EOSINOPHIL NFR BLD AUTO: 0.9 % — SIGNIFICANT CHANGE UP (ref 0–8)
GLUCOSE SERPL-MCNC: 87 MG/DL — SIGNIFICANT CHANGE UP (ref 70–99)
HCT VFR BLD CALC: 33.1 % — LOW (ref 42–52)
HCT VFR BLD CALC: 34.5 % — LOW (ref 42–52)
HGB BLD-MCNC: 10.4 G/DL — LOW (ref 14–18)
HGB BLD-MCNC: 10.9 G/DL — LOW (ref 14–18)
IMM GRANULOCYTES NFR BLD AUTO: 0.3 % — SIGNIFICANT CHANGE UP (ref 0.1–0.3)
LYMPHOCYTES # BLD AUTO: 0.55 K/UL — LOW (ref 1.2–3.4)
LYMPHOCYTES # BLD AUTO: 1.01 K/UL — LOW (ref 1.2–3.4)
LYMPHOCYTES # BLD AUTO: 20.2 % — LOW (ref 20.5–51.1)
LYMPHOCYTES # BLD AUTO: 32.1 % — SIGNIFICANT CHANGE UP (ref 20.5–51.1)
MAGNESIUM SERPL-MCNC: 2.2 MG/DL — SIGNIFICANT CHANGE UP (ref 1.8–2.4)
MCHC RBC-ENTMCNC: 27.2 PG — SIGNIFICANT CHANGE UP (ref 27–31)
MCHC RBC-ENTMCNC: 27.5 PG — SIGNIFICANT CHANGE UP (ref 27–31)
MCHC RBC-ENTMCNC: 31.4 G/DL — LOW (ref 32–37)
MCHC RBC-ENTMCNC: 31.6 G/DL — LOW (ref 32–37)
MCV RBC AUTO: 86.4 FL — SIGNIFICANT CHANGE UP (ref 80–94)
MCV RBC AUTO: 86.9 FL — SIGNIFICANT CHANGE UP (ref 80–94)
METHYLMALONATE SERPL-SCNC: 463 NMOL/L — HIGH (ref 0–378)
MONOCYTES # BLD AUTO: 0.49 K/UL — SIGNIFICANT CHANGE UP (ref 0.1–0.6)
MONOCYTES # BLD AUTO: 0.5 K/UL — SIGNIFICANT CHANGE UP (ref 0.1–0.6)
MONOCYTES NFR BLD AUTO: 15.6 % — HIGH (ref 1.7–9.3)
MONOCYTES NFR BLD AUTO: 18.4 % — HIGH (ref 1.7–9.3)
NEUTROPHILS # BLD AUTO: 1.49 K/UL — SIGNIFICANT CHANGE UP (ref 1.4–6.5)
NEUTROPHILS # BLD AUTO: 1.59 K/UL — SIGNIFICANT CHANGE UP (ref 1.4–6.5)
NEUTROPHILS NFR BLD AUTO: 50.4 % — SIGNIFICANT CHANGE UP (ref 42.2–75.2)
NEUTROPHILS NFR BLD AUTO: 54.4 % — SIGNIFICANT CHANGE UP (ref 42.2–75.2)
NRBC # BLD: 0 /100 WBCS — SIGNIFICANT CHANGE UP (ref 0–0)
NRBC # BLD: SIGNIFICANT CHANGE UP /100 WBCS (ref 0–0)
PLATELET # BLD AUTO: 38 K/UL — LOW (ref 130–400)
PLATELET # BLD AUTO: 40 K/UL — LOW (ref 130–400)
PMV BLD: 11.9 FL — HIGH (ref 7.4–10.4)
PMV BLD: 12.5 FL — HIGH (ref 7.4–10.4)
POTASSIUM SERPL-MCNC: 4.1 MMOL/L — SIGNIFICANT CHANGE UP (ref 3.5–5)
POTASSIUM SERPL-SCNC: 4.1 MMOL/L — SIGNIFICANT CHANGE UP (ref 3.5–5)
PROT SERPL-MCNC: 6.6 G/DL — SIGNIFICANT CHANGE UP (ref 6–8)
RBC # BLD: 3.83 M/UL — LOW (ref 4.7–6.1)
RBC # BLD: 3.97 M/UL — LOW (ref 4.7–6.1)
RBC # FLD: 17.7 % — HIGH (ref 11.5–14.5)
RBC # FLD: 18 % — HIGH (ref 11.5–14.5)
SODIUM SERPL-SCNC: 136 MMOL/L — SIGNIFICANT CHANGE UP (ref 135–146)
WBC # BLD: 2.73 K/UL — LOW (ref 4.8–10.8)
WBC # BLD: 3.15 K/UL — LOW (ref 4.8–10.8)
WBC # FLD AUTO: 2.73 K/UL — LOW (ref 4.8–10.8)
WBC # FLD AUTO: 3.15 K/UL — LOW (ref 4.8–10.8)

## 2024-03-12 PROCEDURE — 88291 CYTO/MOLECULAR REPORT: CPT

## 2024-03-12 PROCEDURE — 88305 TISSUE EXAM BY PATHOLOGIST: CPT | Mod: 26

## 2024-03-12 PROCEDURE — 88342 IMHCHEM/IMCYTCHM 1ST ANTB: CPT | Mod: 26

## 2024-03-12 PROCEDURE — 99232 SBSQ HOSP IP/OBS MODERATE 35: CPT

## 2024-03-12 PROCEDURE — 99233 SBSQ HOSP IP/OBS HIGH 50: CPT | Mod: 25

## 2024-03-12 PROCEDURE — 88311 DECALCIFY TISSUE: CPT | Mod: 26

## 2024-03-12 PROCEDURE — 38222 DX BONE MARROW BX & ASPIR: CPT | Mod: RT

## 2024-03-12 PROCEDURE — 88341 IMHCHEM/IMCYTCHM EA ADD ANTB: CPT | Mod: 26

## 2024-03-12 PROCEDURE — 88313 SPECIAL STAINS GROUP 2: CPT | Mod: 26

## 2024-03-12 RX ORDER — LOSARTAN POTASSIUM 100 MG/1
25 TABLET, FILM COATED ORAL DAILY
Refills: 0 | Status: DISCONTINUED | OUTPATIENT
Start: 2024-03-13 | End: 2024-03-13

## 2024-03-12 RX ORDER — ENOXAPARIN SODIUM 100 MG/ML
40 INJECTION SUBCUTANEOUS EVERY 24 HOURS
Refills: 0 | Status: DISCONTINUED | OUTPATIENT
Start: 2024-03-12 | End: 2024-03-15

## 2024-03-12 RX ADMIN — Medication 25 MILLIGRAM(S): at 06:42

## 2024-03-12 RX ADMIN — SACUBITRIL AND VALSARTAN 1 TABLET(S): 24; 26 TABLET, FILM COATED ORAL at 06:42

## 2024-03-12 RX ADMIN — Medication 40 MILLIGRAM(S): at 06:42

## 2024-03-12 RX ADMIN — Medication 100 MILLIGRAM(S): at 06:43

## 2024-03-12 RX ADMIN — CHLORHEXIDINE GLUCONATE 1 APPLICATION(S): 213 SOLUTION TOPICAL at 06:45

## 2024-03-12 RX ADMIN — PREGABALIN 1000 MICROGRAM(S): 225 CAPSULE ORAL at 11:03

## 2024-03-12 RX ADMIN — Medication 100 MILLIGRAM(S): at 20:26

## 2024-03-12 NOTE — PROGRESS NOTE ADULT - SUBJECTIVE AND OBJECTIVE BOX
NIYA CLINE 79y Male  MRN#: [id number]   Hospital Day: 7d    SUBJECTIVE  Patient is a 79y old Male who presents with a chief complaint of shortness of breath (11 Mar 2024 13:09)  Currently admitted to medicine with the primary diagnosis of SOB (shortness of breath)      INTERVAL HPI AND OVERNIGHT EVENTS:  Patient was examined and seen at bedside. This morning he is resting comfortably in bed and reports no issues or overnight events.    REVIEW OF SYMPTOMS:  CONSTITUTIONAL: No weakness, fevers or chills; No headaches  EYES: No visual changes, eye pain, or discharge  ENT: No vertigo; No ear pain or change in hearing; No sore throat or difficulty swallowing  NECK: No pain or stiffness  RESPIRATORY: No cough, wheezing, or hemoptysis; No shortness of breath  CARDIOVASCULAR: No chest pain or palpitations  GASTROINTESTINAL: No abdominal or epigastric pain; No nausea, vomiting, or hematemesis; No diarrhea or constipation; No melena or hematochezia  GENITOURINARY: No dysuria, frequency or hematuria  MUSCULOSKELETAL: No joint pain, no muscle pain, no weakness  NEUROLOGICAL: No numbness or weakness  SKIN: No itching or rashes    OBJECTIVE  PAST MEDICAL & SURGICAL HISTORY  No pertinent past medical history    No significant past surgical history      ALLERGIES:  No Known Allergies    MEDICATIONS:  STANDING MEDICATIONS  chlorhexidine 2% Cloths 1 Application(s) Topical <User Schedule>  cyanocobalamin 1000 MICROGram(s) Oral daily  furosemide    Tablet 40 milliGRAM(s) Oral daily  influenza  Vaccine (HIGH DOSE) 0.7 milliLiter(s) IntraMuscular once  metoprolol succinate ER 25 milliGRAM(s) Oral daily  sacubitril 24 mG/valsartan 26 mG 1 Tablet(s) Oral two times a day    PRN MEDICATIONS  guaiFENesin Oral Liquid (Sugar-Free) 100 milliGRAM(s) Oral every 6 hours PRN      VITAL SIGNS: Last 24 Hours  T(C): 36.2 (12 Mar 2024 04:49), Max: 36.6 (11 Mar 2024 12:33)  T(F): 97.1 (12 Mar 2024 04:49), Max: 97.9 (11 Mar 2024 20:09)  HR: 85 (12 Mar 2024 04:49) (53 - 85)  BP: 96/64 (12 Mar 2024 04:49) (96/64 - 122/53)  BP(mean): --  RR: 18 (12 Mar 2024 04:49) (18 - 18)  SpO2: 95% (12 Mar 2024 04:49) (95% - 95%)    LABS:                        10.4   2.73  )-----------( 38       ( 12 Mar 2024 07:26 )             33.1     03-12    136  |  100  |  18  ----------------------------<  87  4.1   |  29  |  0.7    Ca    9.1      12 Mar 2024 07:26  Mg     2.2     03-12    TPro  6.6  /  Alb  3.3<L>  /  TBili  0.9  /  DBili  x   /  AST  12  /  ALT  12  /  AlkPhos  76  03-12      Urinalysis Basic - ( 12 Mar 2024 07:26 )    Color: x / Appearance: x / SG: x / pH: x  Gluc: 87 mg/dL / Ketone: x  / Bili: x / Urobili: x   Blood: x / Protein: x / Nitrite: x   Leuk Esterase: x / RBC: x / WBC x   Sq Epi: x / Non Sq Epi: x / Bacteria: x        PHYSICAL EXAM:  CONSTITUTIONAL: No acute distress, well-developed, well-groomed, AAOx3  HEAD: Atraumatic, normocephalic  EYES: EOM intact, PERRLA, conjunctiva and sclera clear  ENT: moist mucous membranes  PULMONARY: Clear to auscultation bilaterally; no wheezes  CARDIOVASCULAR: Regular rate and rhythm; no murmurs  GASTROINTESTINAL: Soft, non-tender, non-distended  MUSCULOSKELETAL: no edema  SKIN: No rashes or lesions; warm and dry    ASSESSMENT & PLAN  80 yo with no PMH presents for eval of new onset shortness of breath for the past few days. Patient reported that he started feeling SOB for the past few days at rest and on exertion and it was progressively worsening where he couldn't sleep for the past 2 nights bcz of orthopnea. He reported associated bilateral ankle swelling.      #Pancytopenia  - no prior blood work for review  - no hx of pancytopenia, malignancy, or hematological disorder per patient  - not taking any medications, denies alcohol and drug use  - Hemoglobin: 10.7 g/dL (03.06.24 @ 06:36)  - Mean Cell Volume: 89.7 fL (03.06.24 @ 06:36)  - Platelet Count - Automated: 51 K/uL (03.06.24 @ 06:36)  - WBC Count: 2.43 K/uL (03.06.24 @ 06:36)  - Auto Neutrophil #: 1.17 K/uL (03.06.24 @ 06:36)  - US liver no definite evidence of cirrhosis   - B12 and folate level noted   - iron studies WNL   - peripheral smear: rare schistocytes giant platelets/platelet clumping, no spherocytosis,   - being worked up by cardiology for HF with reduced ejection fraction 25%   - CT C/A/P : no signs of any LAD  - repeat COOMB test positive x 3    Recommendations:   - follow up MMA level  - c/w oral B12  - follow up flow cytometry   - repeat dino positive but given Hb is stable and very rare schistocytosis seen on smear, no need to treat hemolytic anemia at this time  - monitor daily cbc w/ differential  - plan for bedside bone marrow biopsy today, patient agreeable  NIYA CLINE 79y Male  MRN#: [id number]   Hospital Day: 7d    SUBJECTIVE  Patient is a 79y old Male who presents with a chief complaint of shortness of breath (11 Mar 2024 13:09)  Currently admitted to medicine with the primary diagnosis of SOB (shortness of breath)      INTERVAL HPI AND OVERNIGHT EVENTS:  Patient was examined and seen at bedside. This morning he is resting comfortably in bed and reports no issues or overnight events.    REVIEW OF SYMPTOMS:  CONSTITUTIONAL: No weakness, fevers or chills; No headaches  EYES: No visual changes, eye pain, or discharge  ENT: No vertigo; No ear pain or change in hearing; No sore throat or difficulty swallowing  NECK: No pain or stiffness  RESPIRATORY: No cough, wheezing, or hemoptysis; No shortness of breath  CARDIOVASCULAR: No chest pain or palpitations  GASTROINTESTINAL: No abdominal or epigastric pain; No nausea, vomiting, or hematemesis; No diarrhea or constipation; No melena or hematochezia  GENITOURINARY: No dysuria, frequency or hematuria  MUSCULOSKELETAL: No joint pain, no muscle pain, no weakness  NEUROLOGICAL: No numbness or weakness  SKIN: No itching or rashes    OBJECTIVE  PAST MEDICAL & SURGICAL HISTORY  No pertinent past medical history    No significant past surgical history      ALLERGIES:  No Known Allergies    MEDICATIONS:  STANDING MEDICATIONS  chlorhexidine 2% Cloths 1 Application(s) Topical <User Schedule>  cyanocobalamin 1000 MICROGram(s) Oral daily  furosemide    Tablet 40 milliGRAM(s) Oral daily  influenza  Vaccine (HIGH DOSE) 0.7 milliLiter(s) IntraMuscular once  metoprolol succinate ER 25 milliGRAM(s) Oral daily  sacubitril 24 mG/valsartan 26 mG 1 Tablet(s) Oral two times a day    PRN MEDICATIONS  guaiFENesin Oral Liquid (Sugar-Free) 100 milliGRAM(s) Oral every 6 hours PRN      VITAL SIGNS: Last 24 Hours  T(C): 36.2 (12 Mar 2024 04:49), Max: 36.6 (11 Mar 2024 12:33)  T(F): 97.1 (12 Mar 2024 04:49), Max: 97.9 (11 Mar 2024 20:09)  HR: 85 (12 Mar 2024 04:49) (53 - 85)  BP: 96/64 (12 Mar 2024 04:49) (96/64 - 122/53)  BP(mean): --  RR: 18 (12 Mar 2024 04:49) (18 - 18)  SpO2: 95% (12 Mar 2024 04:49) (95% - 95%)    LABS:                        10.4   2.73  )-----------( 38       ( 12 Mar 2024 07:26 )             33.1     03-12    136  |  100  |  18  ----------------------------<  87  4.1   |  29  |  0.7    Ca    9.1      12 Mar 2024 07:26  Mg     2.2     03-12    TPro  6.6  /  Alb  3.3<L>  /  TBili  0.9  /  DBili  x   /  AST  12  /  ALT  12  /  AlkPhos  76  03-12      Urinalysis Basic - ( 12 Mar 2024 07:26 )    Color: x / Appearance: x / SG: x / pH: x  Gluc: 87 mg/dL / Ketone: x  / Bili: x / Urobili: x   Blood: x / Protein: x / Nitrite: x   Leuk Esterase: x / RBC: x / WBC x   Sq Epi: x / Non Sq Epi: x / Bacteria: x        PHYSICAL EXAM:  CONSTITUTIONAL: No acute distress, well-developed, well-groomed, AAOx3  HEAD: Atraumatic, normocephalic  EYES: EOM intact, PERRLA, conjunctiva and sclera clear  ENT: moist mucous membranes  PULMONARY: Clear to auscultation bilaterally; no wheezes  CARDIOVASCULAR: Regular rate and rhythm; no murmurs  GASTROINTESTINAL: Soft, non-tender, non-distended  MUSCULOSKELETAL: no edema  SKIN: No rashes or lesions; warm and dry    ASSESSMENT & PLAN  78 yo with no PMH presents for eval of new onset shortness of breath for the past few days. Patient reported that he started feeling SOB for the past few days at rest and on exertion and it was progressively worsening where he couldn't sleep for the past 2 nights bcz of orthopnea. He reported associated bilateral ankle swelling.      #Pancytopenia  - no prior blood work for review  - no hx of pancytopenia, malignancy, or hematological disorder per patient  - not taking any medications, denies alcohol and drug use  - Hemoglobin: 10.7 g/dL (03.06.24 @ 06:36)  - Mean Cell Volume: 89.7 fL (03.06.24 @ 06:36)  - Platelet Count - Automated: 51 K/uL (03.06.24 @ 06:36)  - WBC Count: 2.43 K/uL (03.06.24 @ 06:36)  - Auto Neutrophil #: 1.17 K/uL (03.06.24 @ 06:36)  - US liver no definite evidence of cirrhosis   - B12 and folate level noted   - iron studies WNL   - peripheral smear: rare schistocytes giant platelets/platelet clumping, no spherocytosis,   - being worked up by cardiology for HF with reduced ejection fraction 25%   - CT C/A/P : no signs of any LAD  - repeat COOMB test positive x 3    Recommendations:   - follow up MMA level  - c/w oral B12  - follow up flow cytometry   - repeat dino positive but given Hb is stable and very rare schistocytosis seen on smear, no need to treat hemolytic anemia at this time  - monitor daily cbc w/ differential  - check MM panel: SPEP, serum immunofixation, immunoglobulin panel, free light chains  - peripheral smear reviewed: erythroblasts noted, polychromasia, and reticulocytes noted, no schistocytosis no abnormal WBC   - plan for bedside bone marrow biopsy today, patient agreeable  NIYA CLINE 79y Male  MRN#: [id number]   Hospital Day: 7d    SUBJECTIVE  Patient is a 79y old Male who presents with a chief complaint of shortness of breath (11 Mar 2024 13:09)  Currently admitted to medicine with the primary diagnosis of SOB (shortness of breath)      INTERVAL HPI AND OVERNIGHT EVENTS:  Patient was examined and seen at bedside. This morning he is resting comfortably in bed and reports no issues or overnight events.    REVIEW OF SYMPTOMS:  CONSTITUTIONAL: No weakness, fevers or chills; No headaches  EYES: No visual changes, eye pain, or discharge  ENT: No vertigo; No ear pain or change in hearing; No sore throat or difficulty swallowing  NECK: No pain or stiffness  RESPIRATORY: No cough, wheezing, or hemoptysis; No shortness of breath  CARDIOVASCULAR: No chest pain or palpitations  GASTROINTESTINAL: No abdominal or epigastric pain; No nausea, vomiting, or hematemesis; No diarrhea or constipation; No melena or hematochezia  GENITOURINARY: No dysuria, frequency or hematuria  MUSCULOSKELETAL: No joint pain, no muscle pain, no weakness  NEUROLOGICAL: No numbness or weakness  SKIN: No itching or rashes    OBJECTIVE  PAST MEDICAL & SURGICAL HISTORY  No pertinent past medical history    No significant past surgical history      ALLERGIES:  No Known Allergies    MEDICATIONS:  STANDING MEDICATIONS  chlorhexidine 2% Cloths 1 Application(s) Topical <User Schedule>  cyanocobalamin 1000 MICROGram(s) Oral daily  furosemide    Tablet 40 milliGRAM(s) Oral daily  influenza  Vaccine (HIGH DOSE) 0.7 milliLiter(s) IntraMuscular once  metoprolol succinate ER 25 milliGRAM(s) Oral daily  sacubitril 24 mG/valsartan 26 mG 1 Tablet(s) Oral two times a day    PRN MEDICATIONS  guaiFENesin Oral Liquid (Sugar-Free) 100 milliGRAM(s) Oral every 6 hours PRN      VITAL SIGNS: Last 24 Hours  T(C): 36.2 (12 Mar 2024 04:49), Max: 36.6 (11 Mar 2024 12:33)  T(F): 97.1 (12 Mar 2024 04:49), Max: 97.9 (11 Mar 2024 20:09)  HR: 85 (12 Mar 2024 04:49) (53 - 85)  BP: 96/64 (12 Mar 2024 04:49) (96/64 - 122/53)  BP(mean): --  RR: 18 (12 Mar 2024 04:49) (18 - 18)  SpO2: 95% (12 Mar 2024 04:49) (95% - 95%)    LABS:                        10.4   2.73  )-----------( 38       ( 12 Mar 2024 07:26 )             33.1     03-12    136  |  100  |  18  ----------------------------<  87  4.1   |  29  |  0.7    Ca    9.1      12 Mar 2024 07:26  Mg     2.2     03-12    TPro  6.6  /  Alb  3.3<L>  /  TBili  0.9  /  DBili  x   /  AST  12  /  ALT  12  /  AlkPhos  76  03-12      Urinalysis Basic - ( 12 Mar 2024 07:26 )    Color: x / Appearance: x / SG: x / pH: x  Gluc: 87 mg/dL / Ketone: x  / Bili: x / Urobili: x   Blood: x / Protein: x / Nitrite: x   Leuk Esterase: x / RBC: x / WBC x   Sq Epi: x / Non Sq Epi: x / Bacteria: x        PHYSICAL EXAM:  CONSTITUTIONAL: No acute distress, well-developed, well-groomed, AAOx3  HEAD: Atraumatic, normocephalic  EYES: EOM intact, PERRLA, conjunctiva and sclera clear  ENT: moist mucous membranes  PULMONARY: Clear to auscultation bilaterally; no wheezes  CARDIOVASCULAR: Regular rate and rhythm; no murmurs  GASTROINTESTINAL: Soft, non-tender, non-distended  MUSCULOSKELETAL: no edema  SKIN: No rashes or lesions; warm and dry    ASSESSMENT & PLAN  80 yo with no PMH presents for eval of new onset shortness of breath for the past few days. Patient reported that he started feeling SOB for the past few days at rest and on exertion and it was progressively worsening where he couldn't sleep for the past 2 nights bcz of orthopnea. He reported associated bilateral ankle swelling.      #Pancytopenia  - no prior blood work for review  - no hx of pancytopenia, malignancy, or hematological disorder per patient  - not taking any medications, denies alcohol and drug use  - Hemoglobin: 10.7 g/dL (03.06.24 @ 06:36)  - Mean Cell Volume: 89.7 fL (03.06.24 @ 06:36)  - Platelet Count - Automated: 51 K/uL (03.06.24 @ 06:36)  - WBC Count: 2.43 K/uL (03.06.24 @ 06:36)  - Auto Neutrophil #: 1.17 K/uL (03.06.24 @ 06:36)  - US liver no definite evidence of cirrhosis   - B12 and folate level noted   - iron studies WNL   - peripheral smear: rare schistocytes giant platelets/platelet clumping, no spherocytosis,   - being worked up by cardiology for HF with reduced ejection fraction 25%   - CT C/A/P : no signs of any LAD  - repeat COOMB test positive x 3    Recommendations:   - follow up MMA level  - c/w oral B12  - follow up flow cytometry   - repeat dino positive but given Hb is stable , no need to treat hemolytic anemia at this time  - monitor daily cbc w/ differential  - check MM panel: SPEP, serum immunofixation, immunoglobulin panel, free light chains  - peripheral smear reviewed: nucleated rbcs noted, polychromasia, and  no schistocytosis no abnormal WBC   - plan for bedside bone marrow biopsy today, patient agreeable

## 2024-03-12 NOTE — PHYSICAL THERAPY INITIAL EVALUATION ADULT - ADDITIONAL COMMENTS
Pt lives with niece in pvt home, has 6+6 steps to main level. Pt was independent prior to this admission.  Niece reports pt has had issues with balance for past 6 months.

## 2024-03-12 NOTE — PROGRESS NOTE ADULT - SUBJECTIVE AND OBJECTIVE BOX
24H events:    Patient is a 79y old Male who presents with a chief complaint of shortness of breath (12 Mar 2024 10:44)    Primary diagnosis of SOB (shortness of breath)        Today is hospital day 7d. This morning patient was seen and examined at bedside, resting comfortably in bed.    No acute or major events overnight.    Code Status:    Family communication:  Contact date:  Name of person contacted:  Relationship to patient:  Communication details:  What matters most:    PAST MEDICAL & SURGICAL HISTORY  No pertinent past medical history    No significant past surgical history      SOCIAL HISTORY:  Social History:      ALLERGIES:  No Known Allergies    MEDICATIONS:  STANDING MEDICATIONS  chlorhexidine 2% Cloths 1 Application(s) Topical <User Schedule>  cyanocobalamin 1000 MICROGram(s) Oral daily  furosemide    Tablet 40 milliGRAM(s) Oral daily  influenza  Vaccine (HIGH DOSE) 0.7 milliLiter(s) IntraMuscular once  metoprolol succinate ER 25 milliGRAM(s) Oral daily    PRN MEDICATIONS  guaiFENesin Oral Liquid (Sugar-Free) 100 milliGRAM(s) Oral every 6 hours PRN    VITALS:   T(F): 97.1  HR: 74  BP: 92/65  RR: 18  SpO2: 95%    PHYSICAL EXAM:  GENERAL:   ( x ) NAD, lying in bed comfortably     (  ) obtunded     (  ) lethargic     (  ) somnolent    HEAD:   ( x ) Atraumatic     (  ) hematoma     (  ) laceration (specify location:       )     NECK:  ( x ) Supple     (  ) neck stiffness     (  ) nuchal rigidity     (  )  no JVD     (  ) JVD present ( -- cm)    HEART:  Rate -->     ( x ) normal rate     (  ) bradycardic     (  ) tachycardic  Rhythm -->     ( x ) regular     (  ) regularly irregular     (  ) irregularly irregular  Murmurs -->     ( x ) normal s1s2     (  ) systolic murmur     (  ) diastolic murmur     (  ) continuous murmur      (  ) S3 present     (  ) S4 present    LUNGS:   ( x )Unlabored respirations     (  ) tachypnea  ( x ) B/L air entry     (  ) decreased breath sounds in:  (location     )    (  ) no adventitious sound     (  ) crackles     (  ) wheezing      (  ) rhonchi      (specify location:       )  (  ) chest wall tenderness (specify location:       )    ABDOMEN:   ( x ) Soft     (  ) tense   |   ( x ) nondistended     (  ) distended   |   ( x ) +BS     (  ) hypoactive bowel sounds     (  ) hyperactive bowel sounds  ( x ) nontender     (  ) RUQ tenderness     (  ) RLQ tenderness     (  ) LLQ tenderness     (  ) epigastric tenderness     (  ) diffuse tenderness  (  ) Splenomegaly      (  ) Hepatomegaly      (  ) Jaundice     (  ) ecchymosis     EXTREMITIES: RUE with phlebitis around anterior forearm  (  ) Normal     (  ) Rash     (  ) ecchymosis     (  ) varicose veins      (  ) pitting edema     (  ) non-pitting edema   (  ) ulceration     (  ) gangrene:     (location:     )    NERVOUS SYSTEM:    ( x ) A&Ox3     (  ) confused     (  ) lethargic  CN II-XII:     (  ) Intact     (  ) deficits found     (Specify:     )   Upper extremities:     (  ) no sensorimotor deficits     (  ) weakness     (  ) loss of proprioception/vibration     (  ) loss of touch/temperature (specify:    )  Lower extremities:     (  ) no sensorimotor deficits     (  ) weakness     (  ) loss of proprioception/vibration     (  ) loss of touch/temperature (specify:    )    SKIN:   ( x ) No rashes or lesions     (  ) maculopapular rash     (  ) pustules     (  ) vesicles     (  ) ulcer     (  ) ecchymosis     (specify location:     )      AMPAC score:    (  ) Indwelling Tadeo Catheter:   Date insterted:    Reason (  ) Critical illness     (  ) urinary retention    (  ) Accurate Ins/Outs Monitoring     (  ) CMO patient    (  ) Central Line:   Date inserted:  Location: (  ) Right IJ     (  ) Left IJ     (  ) Right Fem     (  ) Left Fem    (  ) SPC        (  ) pigtail       (  ) PEG tube       (  ) colostomy       (  ) jejunostomy  (  ) U-Dall    LABS:                        10.9   3.15  )-----------( 40       ( 12 Mar 2024 11:30 )             34.5     03-12    136  |  100  |  18  ----------------------------<  87  4.1   |  29  |  0.7    Ca    9.1      12 Mar 2024 07:26  Mg     2.2     03-12    TPro  6.6  /  Alb  3.3<L>  /  TBili  0.9  /  DBili  x   /  AST  12  /  ALT  12  /  AlkPhos  76  03-12      Urinalysis Basic - ( 12 Mar 2024 07:26 )    Color: x / Appearance: x / SG: x / pH: x  Gluc: 87 mg/dL / Ketone: x  / Bili: x / Urobili: x   Blood: x / Protein: x / Nitrite: x   Leuk Esterase: x / RBC: x / WBC x   Sq Epi: x / Non Sq Epi: x / Bacteria: x                RADIOLOGY:

## 2024-03-12 NOTE — PHYSICAL THERAPY INITIAL EVALUATION ADULT - PERTINENT HX OF CURRENT PROBLEM, REHAB EVAL
Pt is a 80 yo with no PMH presents for eval of new onset shortness of breath for the past few days. Patient reported that he started feeling SOB for the past few days at rest and on exertion and it was progressively worsening where he couldn't sleep for the past 2 nights because of orthopnea. He reported associated bilateral ankle swelling. Denies chest pain, palpitations, fever, chills, abdominal pain. This is the first time he experiences such sxs. He is usually an active man, never felt SOB or chest pain on exertion or climbing up the stairs. No known sick contacts, no recent travel, ex heavy smoker ( 1 pack per day > 30 yrs, stopped 8 yrs ago ), no alcohol use, no recent weight loss or night sweats. He did not follow u with a PCP for years.

## 2024-03-12 NOTE — PROGRESS NOTE ADULT - ATTENDING COMMENTS
Patient examined at the bedside by myself , above noted edited where appropriate , Pancytopenia , positive direct coomb's . negative FLow.  Bone Marrow done to rule out BM failure states including MDS. Also check DWAIN.

## 2024-03-12 NOTE — PROGRESS NOTE ADULT - TIME BILLING
time spent on review of labs, imaging studies, old records, obtaining history, personally examining patient, counselling and communicating with patient, entering orders for medications/tests/etc, discussions with other health care providers, documentation in electronic health records, independent interpretation of labs, imaging/procedure results and care coordination.
time spent on review of labs, imaging studies, old records, obtaining history, personally examining patient, counselling and communicating with patient, entering orders for medications/tests/etc, discussions with other health care providers, documentation in electronic health records, independent interpretation of labs, imaging/procedure results and care coordination and GOC

## 2024-03-12 NOTE — PHYSICAL THERAPY INITIAL EVALUATION ADULT - GENERAL OBSERVATIONS, REHAB EVAL
14:00-14:32 Pt encountered semifowler in bed in NAD. + O2, + tele.  Pt requesting to use bathroom. + niece at bedside. SPo2 on room air 98%, after ambulation 95%.

## 2024-03-12 NOTE — PROGRESS NOTE ADULT - ATTENDING COMMENTS
My note supersedes all residents notes that I sign, My correction for their notes are in my notes   the patient stated that he feels better, he walked with the help of his niece in the hallway with no sob   his nice at bedside   On exam  General: awake, alert, NAD, chronic ill appearance  Lungs:  clear to ausculations b/l, normal resp effort  Heart: regular ryhthm   Abdomen: soft, non tender non distended  Ext: no edema, can move all  his extremities       78 yo M with no significant PMH presents to ED for worsening BARRIENTOS.     Acute hypoxic respiratory failure 2/2 pulmonary edema 2/2 suspected new onset CHF.  Pt symptoms improving   - proBNP 06176  - monitor daily weight, strict I & O, Low Na diet with fluid restriction.   - monitor on Telemetry.   troponin 42--> 42--> 48   no active chest pain and the sob improved   Echo Left ventricular ejection fraction, by visual estimation, is25 to 30%.   Dilatation of the ascending aorta.  c/w  lasix po to 40 daily   cardiology consult appreciated-patient is not good candidate for aggressive cardiac workup at this point  will treat with GDMT for now as BP tolerat  metoprolol started , losartan switched to Entresto as blood pressure improved but blood pressure noted to systolic 90s so switch back to losartan   EP consulted - Once work up  of pancytopenia as per Hem-onc is performed, will need Ischemic w-up- prefer to be done as inpatient  Consider doing MRI heart once w-up is done  Will benefit from WCD. Discussed risk of Ventricular arrhythmia and risk of SCD w/non-compliance. Agreeable.  Tele Recall  EP when ready to be discharged.  cardiology discussed with the family as well     Pancytopenia  RUQ sono with No definite liver cirrhosis. Liver echogenic foci measuring up to 2 cm, likely hemangiomas.  Hematology on board - to get BM biopsy today - follow   peripheral smear: rare schistocytes giant platelets/platelet clumping, no spherocytosis,  f/u  MMA level  US spleen  splenomegaly  follow up MMA level  follow up MMA level  - c/w oral B12  - follow up flow cytometry   - repeat dino positive but given Hb is stable , no need to treat hemolytic anemia at this time  - monitor daily cbc w/ differential  - check MM panel: SPEP, serum immunofixation, immunoglobulin panel, free light chains  - peripheral smear reviewed: nucleated rbcs noted, polychromasia, and  no schistocytosis no abnormal WBC   - plan for bedside bone marrow biopsy today, patient agreeable     CTAP NO LAD but noted lung lesions - pulmonary consult  appreciated - Recommend F/u outpatient for PET / epeat CT scan in 4-6 weeks.  Oxygen goal 92-95%. When discharged he should follow up with pulmonary for PFTs  incentive spirometry      R cubital area swelling and erythema - improving ( likely infiltrative IV line)    RUE venous Duplex No evidence of right upper extremity deep venous thrombosis. Superficial thrombophlebitis is visualized in right basilic vein.  compresses and elevate the arm       DVT ppx: lovenox in am held for the biopsy - resume tonight     as per hem verbal reccs can give dvt ppx as long as Plt > 30 and no bleeding     pending; cardiology follow up - , BM biopsy today  , K level and platelet / EP before discharge for [facility]  PT        discussed with the patient and his niece in details

## 2024-03-12 NOTE — PROGRESS NOTE ADULT - ASSESSMENT
80 yo with no PMH presents for eval of new onset shortness of breath for the past few days. Patient reported that he started feeling SOB for the past few days at rest and on exertion and it was progressively worsening where he couldn't sleep for the past 2 nights bcz of orthopnea. He reported associated bilateral ankle swelling.    #Shortness of breath 2/2 to volume overload due to new onset CHF   - hemodynamically stable   - spo2 98% on 3.5 L NC - now on 2 L  - trop 42 -> 42 -> 48, pro-BN [id number]  - VBG: lactate 2.8, pH 7.35, pCO2 43    - EKG: sinus with PVCs, LVH   - CXR bilateral basilar opacities, hilar congestion, increased vascular markings    - switched lasix 40 mg IV BID to PO lasix 40 QD  - strict I&Os  - Echo reduced EF 25-30% - cardio recommending optimizing GDMT not a good candidate for ischemic workup at this time, BP soft - switched back from entresto to losartan  - covid/flu/RSV negative  - keep K > 4 and Mg > 2   - EP consulted - Once work up  of pancytopenia as per Hem-onc is performed, will need Ischemic w-up- prefer to be done as inpatient, consider MRI once w/u done. will benefit from WCD on discharge due to NSVTs and risk of arrythmia, will recall EP on dc for WCD      #Pancytopenia  #CT Chest with lung nodules  - RUQ sono with No definite liver cirrhosis. Liver echogenic foci measuring up to 2 cm, likely hemangiomas  - WBC 2.92, Hb 11.6, MCV 88.3, Plt 63, ANC 1250   - no B symptoms, no hx of alcohol use   - retic count 159, , iron studies, B12, folate, dino test, TSH noted  - HIV, hep B and C profile, INR noted  - Heme/onc consult recs appreciated - c/w B12 supplements  - repeat dino +  - f/u MMA level, flow cytometry  - pt agreeable for BM bx - planned for BM bx today  - lung nodules on CT chest and CTAP - pulm cs for suspicious nodules, concern of malignancy, but needs HF to be managed before bx, recommend f/u outpatient for PET or repeat CT scan in 4-6 weeks and PFTs. Oxygen goal 92-95%.      #RUE superficial phlebitis  - superficial phlebitis due to IV infitration  - conservative management for now  - will monitor      #DVT ppx: lovenox - resumed per heme/onc as long as plts >30 and no bleeding  #GI ppx: NA   #Diet: DASH   #Activity: as tolerated 78 yo with no PMH presents for eval of new onset shortness of breath for the past few days. Patient reported that he started feeling SOB for the past few days at rest and on exertion and it was progressively worsening where he couldn't sleep for the past 2 nights bcz of orthopnea. He reported associated bilateral ankle swelling.    #Shortness of breath 2/2 to volume overload due to new onset CHF   - hemodynamically stable   - spo2 98% on 3.5 L NC - now on 2 L  - trop 42 -> 42 -> 48, pro-BN [id number]  - VBG: lactate 2.8, pH 7.35, pCO2 43    - EKG: sinus with PVCs, LVH   - CXR bilateral basilar opacities, hilar congestion, increased vascular markings    - switched lasix 40 mg IV BID to PO lasix 40 QD  - strict I&Os  - Echo reduced EF 25-30% - cardio recommending optimizing GDMT not a good candidate for ischemic workup at this time, BP soft - switched back from entresto to losartan  - covid/flu/RSV negative  - keep K > 4 and Mg > 2   - EP consulted - Once work up  of pancytopenia as per Hem-onc is performed, will need Ischemic w-up- prefer to be done as inpatient, consider MRI once w/u done. Will benefit from WCD on discharge due to NSVTs and risk of arrythmia, will recall EP on dc for WCD      #Pancytopenia  #CT Chest with lung nodules  - RUQ sono with No definite liver cirrhosis. Liver echogenic foci measuring up to 2 cm, likely hemangiomas  - WBC 2.92, Hb 11.6, MCV 88.3, Plt 63, ANC 1250   - no B symptoms, no hx of alcohol use   - retic count 159, , iron studies, B12, folate, idno test, TSH noted  - HIV, hep B and C profile, INR noted  - Heme/onc consult recs appreciated - c/w B12 supplements  - repeat dino +  - f/u MMA level, flow cytometry  - pt agreeable for BM bx - planned for BM bx today  - lung nodules on CT chest and CTAP - pulm cs for suspicious nodules, concern of malignancy, but needs HF to be managed before bx, recommend f/u outpatient for PET or repeat CT scan in 4-6 weeks and PFTs. Oxygen goal 92-95%.      #RUE superficial phlebitis  - superficial phlebitis due to IV infiltration  - conservative management for now  - will monitor      #DVT ppx: lovenox - resumed per heme/onc as long as plts >30 and no bleeding  #GI ppx: NA   #Diet: DASH   #Activity: as tolerated, per PT with home PT 80 yo with no PMH presents for eval of new onset shortness of breath for the past few days. Patient reported that he started feeling SOB for the past few days at rest and on exertion and it was progressively worsening where he couldn't sleep for the past 2 nights bcz of orthopnea. He reported associated bilateral ankle swelling.    #Shortness of breath 2/2 to volume overload due to new onset CHF   - hemodynamically stable   - spo2 98% on 3.5 L NC - now on 2 L  - trop 42 -> 42 -> 48, pro-BN [id number]  - VBG: lactate 2.8, pH 7.35, pCO2 43    - EKG: sinus with PVCs, LVH   - CXR bilateral basilar opacities, hilar congestion, increased vascular markings    - switched lasix 40 mg IV BID to PO lasix 40 QD  - strict I&Os  - Echo reduced EF 25-30% - cardio recommending optimizing GDMT not a good candidate for ischemic workup at this time, BP soft - switched back from entresto to losartan  - covid/flu/RSV negative  - keep K > 4 and Mg > 2   - EP consulted - Once work up  of pancytopenia as per Hem-onc is performed, will need Ischemic w-up- prefer to be done as inpatient, consider MRI once w/u done. Will benefit from WCD on discharge due to NSVTs and risk of arrythmia, will recall EP on dc for WCD      #Pancytopenia  #CT Chest with lung nodules  - RUQ sono with No definite liver cirrhosis. Liver echogenic foci measuring up to 2 cm, likely hemangiomas  - WBC 2.92, Hb 11.6, MCV 88.3, Plt 63, ANC 1250   - no B symptoms, no hx of alcohol use   - retic count 159, , iron studies, B12, folate, dino test, TSH noted  - HIV, hep B and C profile, INR noted  - Heme/onc consult recs appreciated - c/w B12 supplements  - repeat dino +  - f/u MMA level, flow cytometry  - pt agreeable for BM bx - planned for BM bx today, resume lovenox tomorrow  - lung nodules on CT chest and CTAP - pulm cs for suspicious nodules, concern of malignancy, but needs HF to be managed before bx, recommend f/u outpatient for PET or repeat CT scan in 4-6 weeks and PFTs. Oxygen goal 92-95%.      #RUE superficial phlebitis  - superficial phlebitis due to IV infiltration seen on RUE duplex  - no DVT seen on RUE duplex  - conservative management for now  - will monitor      #DVT ppx: lovenox - resumed per heme/onc as long as plts >30 and no bleeding  #GI ppx: NA   #Diet: DASH   #Activity: as tolerated, per PT with home PT

## 2024-03-12 NOTE — PHYSICAL THERAPY INITIAL EVALUATION ADULT - LEVEL OF INDEPENDENCE: STAIR NEGOTIATION, REHAB EVAL
upon return to room both rep from [facility] and MD present for bone biopsy . Will f/u for stairs on next session

## 2024-03-12 NOTE — PHYSICAL THERAPY INITIAL EVALUATION ADULT - GAIT DISTANCE, PT EVAL
20 ft x 1 to bathroom with RW  then 20 ft x 1 back to bed without RW with contact guard. PT recommended to use RW for improved balance but pt declined. Pt ambulated 50 ft x 2 with contact guard 2* to NBOS and increased loss of balance with progressive ambulation. - 2 episodes of loss of balance requiring min A for recovery

## 2024-03-13 LAB
ALBUMIN SERPL ELPH-MCNC: 3.2 G/DL — LOW (ref 3.5–5.2)
ALP SERPL-CCNC: 74 U/L — SIGNIFICANT CHANGE UP (ref 30–115)
ALT FLD-CCNC: 13 U/L — SIGNIFICANT CHANGE UP (ref 0–41)
ANION GAP SERPL CALC-SCNC: 10 MMOL/L — SIGNIFICANT CHANGE UP (ref 7–14)
AST SERPL-CCNC: 11 U/L — SIGNIFICANT CHANGE UP (ref 0–41)
BASOPHILS # BLD AUTO: 0.03 K/UL — SIGNIFICANT CHANGE UP (ref 0–0.2)
BASOPHILS NFR BLD AUTO: 1.4 % — HIGH (ref 0–1)
BILIRUB SERPL-MCNC: 0.9 MG/DL — SIGNIFICANT CHANGE UP (ref 0.2–1.2)
BUN SERPL-MCNC: 18 MG/DL — SIGNIFICANT CHANGE UP (ref 10–20)
CALCIUM SERPL-MCNC: 8.7 MG/DL — SIGNIFICANT CHANGE UP (ref 8.4–10.5)
CHLORIDE SERPL-SCNC: 100 MMOL/L — SIGNIFICANT CHANGE UP (ref 98–110)
CO2 SERPL-SCNC: 29 MMOL/L — SIGNIFICANT CHANGE UP (ref 17–32)
CREAT SERPL-MCNC: 0.8 MG/DL — SIGNIFICANT CHANGE UP (ref 0.7–1.5)
EGFR: 90 ML/MIN/1.73M2 — SIGNIFICANT CHANGE UP
EOSINOPHIL # BLD AUTO: 0.03 K/UL — SIGNIFICANT CHANGE UP (ref 0–0.7)
EOSINOPHIL NFR BLD AUTO: 1.4 % — SIGNIFICANT CHANGE UP (ref 0–8)
GLUCOSE SERPL-MCNC: 96 MG/DL — SIGNIFICANT CHANGE UP (ref 70–99)
HCT VFR BLD CALC: 31.5 % — LOW (ref 42–52)
HGB BLD-MCNC: 10 G/DL — LOW (ref 14–18)
IMM GRANULOCYTES NFR BLD AUTO: 0.5 % — HIGH (ref 0.1–0.3)
LYMPHOCYTES # BLD AUTO: 0.75 K/UL — LOW (ref 1.2–3.4)
LYMPHOCYTES # BLD AUTO: 35.7 % — SIGNIFICANT CHANGE UP (ref 20.5–51.1)
MAGNESIUM SERPL-MCNC: 2.2 MG/DL — SIGNIFICANT CHANGE UP (ref 1.8–2.4)
MCHC RBC-ENTMCNC: 27.1 PG — SIGNIFICANT CHANGE UP (ref 27–31)
MCHC RBC-ENTMCNC: 31.7 G/DL — LOW (ref 32–37)
MCV RBC AUTO: 85.4 FL — SIGNIFICANT CHANGE UP (ref 80–94)
MONOCYTES # BLD AUTO: 0.48 K/UL — SIGNIFICANT CHANGE UP (ref 0.1–0.6)
MONOCYTES NFR BLD AUTO: 22.9 % — HIGH (ref 1.7–9.3)
NEUTROPHILS # BLD AUTO: 0.8 K/UL — LOW (ref 1.4–6.5)
NEUTROPHILS NFR BLD AUTO: 38.1 % — LOW (ref 42.2–75.2)
NRBC # BLD: 0 /100 WBCS — SIGNIFICANT CHANGE UP (ref 0–0)
PLATELET # BLD AUTO: 32 K/UL — LOW (ref 130–400)
PMV BLD: 11.6 FL — HIGH (ref 7.4–10.4)
POTASSIUM SERPL-MCNC: 3.3 MMOL/L — LOW (ref 3.5–5)
POTASSIUM SERPL-SCNC: 3.3 MMOL/L — LOW (ref 3.5–5)
PROT SERPL-MCNC: 6.7 G/DL — SIGNIFICANT CHANGE UP (ref 6–8)
RBC # BLD: 3.69 M/UL — LOW (ref 4.7–6.1)
RBC # FLD: 17.4 % — HIGH (ref 11.5–14.5)
SODIUM SERPL-SCNC: 139 MMOL/L — SIGNIFICANT CHANGE UP (ref 135–146)
WBC # BLD: 2.1 K/UL — LOW (ref 4.8–10.8)
WBC # FLD AUTO: 2.1 K/UL — LOW (ref 4.8–10.8)

## 2024-03-13 PROCEDURE — 99232 SBSQ HOSP IP/OBS MODERATE 35: CPT

## 2024-03-13 PROCEDURE — 71045 X-RAY EXAM CHEST 1 VIEW: CPT | Mod: 26

## 2024-03-13 RX ORDER — LOSARTAN POTASSIUM 100 MG/1
25 TABLET, FILM COATED ORAL DAILY
Refills: 0 | Status: DISCONTINUED | OUTPATIENT
Start: 2024-03-14 | End: 2024-03-15

## 2024-03-13 RX ORDER — POTASSIUM CHLORIDE 20 MEQ
40 PACKET (EA) ORAL ONCE
Refills: 0 | Status: COMPLETED | OUTPATIENT
Start: 2024-03-13 | End: 2024-03-13

## 2024-03-13 RX ORDER — BUDESONIDE AND FORMOTEROL FUMARATE DIHYDRATE 160; 4.5 UG/1; UG/1
2 AEROSOL RESPIRATORY (INHALATION)
Refills: 0 | Status: DISCONTINUED | OUTPATIENT
Start: 2024-03-13 | End: 2024-03-15

## 2024-03-13 RX ORDER — POTASSIUM CHLORIDE 20 MEQ
20 PACKET (EA) ORAL ONCE
Refills: 0 | Status: DISCONTINUED | OUTPATIENT
Start: 2024-03-13 | End: 2024-03-13

## 2024-03-13 RX ORDER — FUROSEMIDE 40 MG
40 TABLET ORAL DAILY
Refills: 0 | Status: DISCONTINUED | OUTPATIENT
Start: 2024-03-13 | End: 2024-03-15

## 2024-03-13 RX ADMIN — Medication 40 MILLIGRAM(S): at 14:53

## 2024-03-13 RX ADMIN — Medication 40 MILLIEQUIVALENT(S): at 16:47

## 2024-03-13 RX ADMIN — CHLORHEXIDINE GLUCONATE 1 APPLICATION(S): 213 SOLUTION TOPICAL at 06:24

## 2024-03-13 RX ADMIN — ENOXAPARIN SODIUM 40 MILLIGRAM(S): 100 INJECTION SUBCUTANEOUS at 05:44

## 2024-03-13 RX ADMIN — Medication 40 MILLIGRAM(S): at 05:44

## 2024-03-13 RX ADMIN — Medication 25 MILLIGRAM(S): at 05:44

## 2024-03-13 RX ADMIN — Medication 100 MILLIGRAM(S): at 14:48

## 2024-03-13 RX ADMIN — LOSARTAN POTASSIUM 25 MILLIGRAM(S): 100 TABLET, FILM COATED ORAL at 05:44

## 2024-03-13 RX ADMIN — PREGABALIN 1000 MICROGRAM(S): 225 CAPSULE ORAL at 11:31

## 2024-03-13 NOTE — PROGRESS NOTE ADULT - SUBJECTIVE AND OBJECTIVE BOX
24H events:    Patient is a 79y old Male who presents with a chief complaint of shortness of breath (13 Mar 2024 08:50)    Primary diagnosis of SOB (shortness of breath)        Today is hospital day 8d. This morning patient was seen and examined at bedside, resting comfortably in bed.    No acute or major events overnight.    Code Status:    Family communication:  Contact date:  Name of person contacted:  Relationship to patient:  Communication details:  What matters most:    PAST MEDICAL & SURGICAL HISTORY  No pertinent past medical history    No significant past surgical history      SOCIAL HISTORY:  Social History:      ALLERGIES:  No Known Allergies    MEDICATIONS:  STANDING MEDICATIONS  budesonide 160 MICROgram(s)/formoterol 4.5 MICROgram(s) Inhaler 2 Puff(s) Inhalation two times a day  chlorhexidine 2% Cloths 1 Application(s) Topical <User Schedule>  cyanocobalamin 1000 MICROGram(s) Oral daily  enoxaparin Injectable 40 milliGRAM(s) SubCutaneous every 24 hours  furosemide   Injectable 40 milliGRAM(s) IV Push daily  influenza  Vaccine (HIGH DOSE) 0.7 milliLiter(s) IntraMuscular once  metoprolol succinate ER 25 milliGRAM(s) Oral daily    PRN MEDICATIONS  guaiFENesin Oral Liquid (Sugar-Free) 100 milliGRAM(s) Oral every 6 hours PRN    VITALS:   T(F): 96.5  HR: 84  BP: 112/60  RR: 18  SpO2: 90%    PHYSICAL EXAM:  GENERAL:   ( x ) NAD, lying in bed comfortably     (  ) obtunded     (  ) lethargic     (  ) somnolent    HEAD:   ( x ) Atraumatic     (  ) hematoma     (  ) laceration (specify location:       )     NECK:  ( x ) Supple     (  ) neck stiffness     (  ) nuchal rigidity     (  )  no JVD     (  ) JVD present ( -- cm)    HEART:  Rate -->     ( x ) normal rate     (  ) bradycardic     (  ) tachycardic  Rhythm -->     ( x ) regular     (  ) regularly irregular     (  ) irregularly irregular  Murmurs -->     ( x ) normal s1s2     (  ) systolic murmur     (  ) diastolic murmur     (  ) continuous murmur      (  ) S3 present     (  ) S4 present    LUNGS:   ( x )Unlabored respirations     (  ) tachypnea  ( x ) B/L air entry     (  ) decreased breath sounds in:  (location     )    (  ) no adventitious sound     (  ) crackles     (  ) wheezing      (  ) rhonchi      (specify location:       )  (  ) chest wall tenderness (specify location:       )    ABDOMEN:   ( x ) Soft     (  ) tense   |   ( x ) nondistended     (  ) distended   |   ( x ) +BS     (  ) hypoactive bowel sounds     (  ) hyperactive bowel sounds  ( x ) nontender     (  ) RUQ tenderness     (  ) RLQ tenderness     (  ) LLQ tenderness     (  ) epigastric tenderness     (  ) diffuse tenderness  (  ) Splenomegaly      (  ) Hepatomegaly      (  ) Jaundice     (  ) ecchymosis     EXTREMITIES: RUE with phlebitis around anterior forearm  (  ) Normal     (  ) Rash     (  ) ecchymosis     (  ) varicose veins      (  ) pitting edema     (  ) non-pitting edema   (  ) ulceration     (  ) gangrene:     (location:     )    NERVOUS SYSTEM:    ( x ) A&Ox3     (  ) confused     (  ) lethargic  CN II-XII:     (  ) Intact     (  ) deficits found     (Specify:     )   Upper extremities:     (  ) no sensorimotor deficits     (  ) weakness     (  ) loss of proprioception/vibration     (  ) loss of touch/temperature (specify:    )  Lower extremities:     (  ) no sensorimotor deficits     (  ) weakness     (  ) loss of proprioception/vibration     (  ) loss of touch/temperature (specify:    )    SKIN:   ( x ) No rashes or lesions     (  ) maculopapular rash     (  ) pustules     (  ) vesicles     (  ) ulcer     (  ) ecchymosis     (specify location:     )    AMPAC score:    (  ) Indwelling Tadeo Catheter:   Date insterted:    Reason (  ) Critical illness     (  ) urinary retention    (  ) Accurate Ins/Outs Monitoring     (  ) CMO patient    (  ) Central Line:   Date inserted:  Location: (  ) Right IJ     (  ) Left IJ     (  ) Right Fem     (  ) Left Fem    (  ) SPC        (  ) pigtail       (  ) PEG tube       (  ) colostomy       (  ) jejunostomy  (  ) U-Dall    LABS:                        10.0   2.10  )-----------( 32       ( 13 Mar 2024 04:30 )             31.5     03-13    139  |  100  |  18  ----------------------------<  96  3.3<L>   |  29  |  0.8    Ca    8.7      13 Mar 2024 04:30  Mg     2.2     03-13    TPro  6.7  /  Alb  3.2<L>  /  TBili  0.9  /  DBili  x   /  AST  11  /  ALT  13  /  AlkPhos  74  03-13      Urinalysis Basic - ( 13 Mar 2024 04:30 )    Color: x / Appearance: x / SG: x / pH: x  Gluc: 96 mg/dL / Ketone: x  / Bili: x / Urobili: x   Blood: x / Protein: x / Nitrite: x   Leuk Esterase: x / RBC: x / WBC x   Sq Epi: x / Non Sq Epi: x / Bacteria: x                RADIOLOGY:

## 2024-03-13 NOTE — PROGRESS NOTE ADULT - SUBJECTIVE AND OBJECTIVE BOX
NIYA CLINE  79y  [facility] 018 A      Patient is a 79y old  Male who presents with a chief complaint of shortness of breath (12 Mar 2024 15:06)      INTERVAL HPI/OVERNIGHT EVENTS:        REVIEW OF SYSTEMS:        FAMILY HISTORY:  No pertinent family history in first degree relatives      T(C): 36.7 (03-13-24 @ 04:27), Max: 36.9 (03-12-24 @ 19:38)  HR: 82 (03-13-24 @ 04:27) (74 - 91)  BP: 109/63 (03-13-24 @ 04:27) (92/65 - 109/63)  RR: 18 (03-13-24 @ 08:17) (18 - 18)  SpO2: 96% (03-13-24 @ 08:17) (95% - 96%)  Wt(kg): --Vital Signs Last 24 Hrs  T(C): 36.7 (13 Mar 2024 04:27), Max: 36.9 (12 Mar 2024 19:38)  T(F): 98 (13 Mar 2024 04:27), Max: 98.5 (12 Mar 2024 19:38)  HR: 82 (13 Mar 2024 04:27) (74 - 91)  BP: 109/63 (13 Mar 2024 04:27) (92/65 - 109/63)  BP(mean): --  RR: 18 (13 Mar 2024 08:17) (18 - 18)  SpO2: 96% (13 Mar 2024 08:17) (95% - 96%)    Parameters below as of 13 Mar 2024 08:17  Patient On (Oxygen Delivery Method): room air        PHYSICAL EXAM:  GENERAL: NAD, well-groomed, well-developed  HEAD:  Atraumatic, Normocephalic  EYES: EOMI, PERRLA, conjunctiva and sclera clear  ENMT: No tonsillar erythema, exudates, or enlargement; Moist mucous membranes, Good dentition, No lesions  NECK: Supple, No JVD, Normal thyroid  NERVOUS SYSTEM:  Alert & Oriented X3, Good concentration; Motor Strength 5/5 B/L upper and lower extremities; DTRs 2+ intact and symmetric  PULM: Clear to auscultation bilaterally  CARDIAC: Regular rate and rhythm; No murmurs, rubs, or gallops  GI: Soft, Nontender, Nondistended; Bowel sounds present  EXTREMITIES:  2+ Peripheral Pulses, No clubbing, cyanosis, or edema  LYMPH: No lymphadenopathy noted  SKIN: No rashes or lesions    Consultant(s) Notes Reviewed:  [x ] YES  [ ] NO  Care Discussed with Consultants/Other Providers [ x] YES  [ ] NO    LABS:                            10.0   2.10  )-----------( 32       ( 13 Mar 2024 04:30 )             31.5   03-13    139  |  100  |  18  ----------------------------<  96  3.3<L>   |  29  |  0.8    Ca    8.7      13 Mar 2024 04:30  Mg     2.2     03-13    TPro  6.7  /  Alb  3.2<L>  /  TBili  0.9  /  DBili  x   /  AST  11  /  ALT  13  /  AlkPhos  74  03-13            chlorhexidine 2% Cloths 1 Application(s) Topical <User Schedule>  cyanocobalamin 1000 MICROGram(s) Oral daily  enoxaparin Injectable 40 milliGRAM(s) SubCutaneous every 24 hours  furosemide    Tablet 40 milliGRAM(s) Oral daily  guaiFENesin Oral Liquid (Sugar-Free) 100 milliGRAM(s) Oral every 6 hours PRN  influenza  Vaccine (HIGH DOSE) 0.7 milliLiter(s) IntraMuscular once  losartan 25 milliGRAM(s) Oral daily  metoprolol succinate ER 25 milliGRAM(s) Oral daily        78 yo M with no significant PMH presents to ED for worsening BARRIENTOS.     1. Acute hypoxic respiratory failure secondary to suspected new onset acute systolic CHF.  - Telemetry                - ECG:                    - BNP elevated   - Trop detectable   - Cont lasix   - Started on metoprolol  - losartan was switched to entresto   - Echo Left ventricular ejection fraction, by visual estimation, is25 to 30%.  - CT Chest:  a) Emphysematous changes with architectural distortion.  b) Bilateral pleural effusions with compressive atelectasis.  c) Several bilateral masslike opacities within the lungs, nonspecific. Short-term follow-up after treatment with eventual PET/CT scanning as an outpatient if these structures do not respond.  - EP consulted:  a)  Once work up  of pancytopenia as per Hem-onc is performed, will need Ischemic w-up- prefer to be done as inpatient +Consider doing MRI heart once w-up is done  b) Will benefit from WCD. Discussed risk of Ventricular arrhythmia and risk of SCD w/non-compliance. Agreeable.  - Cardio consult appreciated       2. Pancytopenia  - peripheral smear: rare schistocytes giant platelets/platelet clumping, no spherocytosis,        - repeat dino positive but given Hb is stable , no need to treat hemolytic anemia at this time  - RUQ sono with No definite liver cirrhosis. Liver echogenic foci measuring up to 2 cm, likely hemangiomas. + splenomegaly   - cont vit b12         - MMA:  - SPEP:          - Immunofixation:          - Free light chains:   - Flow cytometry:   - Bone marrow bx:  - Hematology consult appreciated       3. R cubital area swelling and erythema - improving ( likely infiltrative IV line)  - RUE venous Duplex No evidence of right upper extremity deep venous thrombosis. Superficial thrombophlebitis is visualized in right basilic vein.  compresses and elevate the arm       DVT ppx: lovenox in am held for the biopsy - resume tonight     as per hem verbal reccs can give dvt ppx as long as Plt > 30 and no bleeding            SONNIYA  79y  [facility] 3[facility] 018 A      Patient is a 79y old  Male who presents with a chief complaint of shortness of breath (12 Mar 2024 15:06)      INTERVAL HPI/OVERNIGHT EVENTS:    Patient feels better  still on oxygen which he usually doesn't use at home  no overnight events  family at the bedside updated about his condition       FAMILY HISTORY:  No pertinent family history in first degree relatives      T(C): 36.7 (03-13-24 @ 04:27), Max: 36.9 (03-12-24 @ 19:38)  HR: 82 (03-13-24 @ 04:27) (74 - 91)  BP: 109/63 (03-13-24 @ 04:27) (92/65 - 109/63)  RR: 18 (03-13-24 @ 08:17) (18 - 18)  SpO2: 96% (03-13-24 @ 08:17) (95% - 96%)  Wt(kg): --Vital Signs Last 24 Hrs  T(C): 36.7 (13 Mar 2024 04:27), Max: 36.9 (12 Mar 2024 19:38)  T(F): 98 (13 Mar 2024 04:27), Max: 98.5 (12 Mar 2024 19:38)  HR: 82 (13 Mar 2024 04:27) (74 - 91)  BP: 109/63 (13 Mar 2024 04:27) (92/65 - 109/63)  BP(mean): --  RR: 18 (13 Mar 2024 08:17) (18 - 18)  SpO2: 96% (13 Mar 2024 08:17) (95% - 96%)    Parameters below as of 13 Mar 2024 08:17  Patient On (Oxygen Delivery Method): room air        PHYSICAL EXAM:  GENERAL: Slightly cachectic , chronically ill   NERVOUS SYSTEM:  Alert & Oriented X3  PULM: Clear to auscultation bilaterally  CARDIAC: Regular rate and rhythm;  GI: Soft, Nontender, Nondistended; Bowel sounds present  EXTREMITIES:  2+ Peripheral Pulses,    Consultant(s) Notes Reviewed:  [x ] YES  [ ] NO  Care Discussed with Consultants/Other Providers [ x] YES  [ ] NO    LABS:                            10.0   2.10  )-----------( 32       ( 13 Mar 2024 04:30 )             31.5   03-13    139  |  100  |  18  ----------------------------<  96  3.3<L>   |  29  |  0.8    Ca    8.7      13 Mar 2024 04:30  Mg     2.2     03-13    TPro  6.7  /  Alb  3.2<L>  /  TBili  0.9  /  DBili  x   /  AST  11  /  ALT  13  /  AlkPhos  74  03-13            chlorhexidine 2% Cloths 1 Application(s) Topical <User Schedule>  cyanocobalamin 1000 MICROGram(s) Oral daily  enoxaparin Injectable 40 milliGRAM(s) SubCutaneous every 24 hours  furosemide    Tablet 40 milliGRAM(s) Oral daily  guaiFENesin Oral Liquid (Sugar-Free) 100 milliGRAM(s) Oral every 6 hours PRN  influenza  Vaccine (HIGH DOSE) 0.7 milliLiter(s) IntraMuscular once  losartan 25 milliGRAM(s) Oral daily  metoprolol succinate ER 25 milliGRAM(s) Oral daily        78 yo M with no significant PMH presents to ED for worsening BARRIENTOS.     1. Acute hypoxic respiratory failure secondary to suspected new onset acute systolic CHF and emphysema   - Telemetry                - ECG: Sinus with PVC                    - CXR: interstitial marking. BNP elevated   - Trop detectable   - Cont lasix 40mg IV daily   - Started on metoprolol 25 mg po daily   - Cont losartan 25mg po daily with parameters   * pt had low bp with entresto defer to outpatient to re-start   - start Symbicort bid   - Echo Left ventricular ejection fraction, by visual estimation, is25 to 30%.  - CT Chest:  a) Emphysematous changes with architectural distortion.  b) Bilateral pleural effusions with compressive atelectasis.  c) Several bilateral masslike opacities within the lungs, nonspecific. Short-term follow-up after treatment with eventual PET/CT scanning as an outpatient if these structures do not respond.  - EP consulted:  a)  Once work up  of pancytopenia as per Hem-onc is performed, will need Ischemic w-up- prefer to be done as inpatient +Consider doing MRI heart once w-up is done  b) Will benefit from WCD. Discussed risk of Ventricular arrhythmia and risk of SCD w/non-compliance. Agreeable.  - Cardio consult appreciated       2. Pancytopenia  - peripheral smear: rare schistocytes giant platelets/platelet clumping, no spherocytosis,        - repeat dino positive but given Hb is stable , no need to treat hemolytic anemia at this time  - RUQ sono with No definite liver cirrhosis. Liver echogenic foci measuring up to 2 cm, likely hemangiomas. + splenomegaly   - cont vit b12         - MMA:elevated   - SPEP:pending          - Immunofixation:pending          - Free light chains: pending  - Flow cytometry: pending  - Bone marrow bx done  - Hematology consult appreciated       3. R cubital area swelling and erythema - improving ( likely infiltrative IV line)  - RUE venous Duplex No evidence of right upper extremity deep venous thrombosis. Superficial thrombophlebitis is visualized in right basilic vein.  compresses and elevate the arm       DVT ppx: lovenox    as per hem verbal reccs can give dvt ppx as long as Plt > 30 and no bleeding

## 2024-03-13 NOTE — PROGRESS NOTE ADULT - ASSESSMENT
78 yo with no PMH presents for eval of new onset shortness of breath for the past few days. Patient reported that he started feeling SOB for the past few days at rest and on exertion and it was progressively worsening where he couldn't sleep for the past 2 nights bcz of orthopnea. He reported associated bilateral ankle swelling.    #Shortness of breath 2/2 to volume overload due to new onset CHF   - hemodynamically stable   - spo2 98% on 3.5 L NC - now on 2 L  - trop 42 -> 42 -> 48, pro-BN [id number]  - VBG: lactate 2.8, pH 7.35, pCO2 43    - EKG: sinus with PVCs, LVH   - CXR bilateral basilar opacities, hilar congestion, increased vascular markings - improved CXR this AM  - switched back to IV lasix 40 daily  - satting 90% on RA, placed back on NC  - added symbicort  - strict I&Os  - Echo reduced EF 25-30% - cardio recommending optimizing GDMT not a good candidate for ischemic workup at this time, BP soft - switched back from entresto to losartan (will need op f/u with cardio for medication optimizaiton)  - covid/flu/RSV negative  - keep K > 4 and Mg > 2   - EP consulted - Once work up  of pancytopenia as per Hem-onc is performed, will need Ischemic w-up- prefer to be done as inpatient, consider MRI once w/u done. Will benefit from WCD on discharge due to NSVTs and risk of arrythmia, repeat fitting today      #Pancytopenia  #CT Chest with lung nodules  - RUQ sono with No definite liver cirrhosis. Liver echogenic foci measuring up to 2 cm, likely hemangiomas  - WBC 2.92, Hb 11.6, MCV 88.3, Plt 63, ANC 1250   - no B symptoms, no hx of alcohol use   - retic count 159, , iron studies, B12, folate, dino test, TSH noted  - HIV, hep B and C profile, INR noted  - Heme/onc consult recs appreciated - c/w B12 supplements  - repeat dino +  - MMA level elevated 463 elevated, f/u flow cytometry, SPEP, IF  - s/p BM bx by heme/onc yesterday, f/u path results  - lung nodules on CT chest and CTAP - pulm cs for suspicious nodules, concern of malignancy, but needs HF to be managed before bx, recommend f/u outpatient for PET or repeat CT scan in 4-6 weeks and PFTs. Oxygen goal 92-95%.      #RUE superficial phlebitis  - superficial phlebitis due to IV infiltration seen on RUE duplex  - no DVT seen on RUE duplex  - conservative management for now  - will monitor      #DVT ppx: lovenox - resumed per heme/onc as long as plts >30 and no bleeding  #GI ppx: NA   #Diet: DASH   #Activity: as tolerated, per PT with home PT 78 yo with no PMH presents for eval of new onset shortness of breath for the past few days. Patient reported that he started feeling SOB for the past few days at rest and on exertion and it was progressively worsening where he couldn't sleep for the past 2 nights bcz of orthopnea. He reported associated bilateral ankle swelling.    #Shortness of breath 2/2 to volume overload due to new onset CHF   - hemodynamically stable   - spo2 98% on 3.5 L NC - now on 2 L  - trop 42 -> 42 -> 48, pro-BN [id number], f/u repeat BNP  - VBG: lactate 2.8, pH 7.35, pCO2 43    - EKG: sinus with PVCs, LVH   - CXR bilateral basilar opacities, hilar congestion, increased vascular markings - improved CXR this AM  - switched back to IV lasix 40 daily  - satting 90% on RA, placed back on NC  - added symbicort  - strict I&Os  - Echo reduced EF 25-30% - cardio recommending optimizing GDMT not a good candidate for ischemic workup at this time, BP soft - switched back from entresto to losartan (will need op f/u with cardio for medication optimizaiton)  - covid/flu/RSV negative  - keep K > 4 and Mg > 2   - EP consulted - Once work up  of pancytopenia as per Hem-onc is performed, will need Ischemic w-up- prefer to be done as inpatient, consider MRI once w/u done. Will benefit from WCD on discharge due to NSVTs and risk of arrythmia, repeat fitting today      #Pancytopenia  #CT Chest with lung nodules  - RUQ sono with No definite liver cirrhosis. Liver echogenic foci measuring up to 2 cm, likely hemangiomas  - WBC 2.92, Hb 11.6, MCV 88.3, Plt 63, ANC 1250   - no B symptoms, no hx of alcohol use   - retic count 159, , iron studies, B12, folate, dino test, TSH noted  - HIV, hep B and C profile, INR noted  - Heme/onc consult recs appreciated - c/w B12 supplements  - repeat dino +  - MMA level elevated 463 elevated, f/u flow cytometry, SPEP, IF  - s/p BM bx by heme/onc yesterday, f/u path results  - lung nodules on CT chest and CTAP - pulm cs for suspicious nodules, concern of malignancy, but needs HF to be managed before bx, recommend f/u outpatient for PET or repeat CT scan in 4-6 weeks and PFTs. Oxygen goal 92-95%.      #RUE superficial phlebitis  - superficial phlebitis due to IV infiltration seen on RUE duplex  - no DVT seen on RUE duplex  - conservative management for now  - will monitor      #DVT ppx: lovenox - resumed per heme/onc as long as plts >30 and no bleeding  #GI ppx: NA   #Diet: DASH   #Activity: as tolerated, per PT with home PT

## 2024-03-14 ENCOUNTER — TRANSCRIPTION ENCOUNTER (OUTPATIENT)
Age: 79
End: 2024-03-14

## 2024-03-14 LAB
ALBUMIN SERPL ELPH-MCNC: 3.3 G/DL — LOW (ref 3.5–5.2)
ALP SERPL-CCNC: 77 U/L — SIGNIFICANT CHANGE UP (ref 30–115)
ALT FLD-CCNC: 13 U/L — SIGNIFICANT CHANGE UP (ref 0–41)
ANA TITR SER: NEGATIVE — SIGNIFICANT CHANGE UP
ANION GAP SERPL CALC-SCNC: 8 MMOL/L — SIGNIFICANT CHANGE UP (ref 7–14)
AST SERPL-CCNC: 13 U/L — SIGNIFICANT CHANGE UP (ref 0–41)
BASOPHILS # BLD AUTO: 0.06 K/UL — SIGNIFICANT CHANGE UP (ref 0–0.2)
BASOPHILS NFR BLD AUTO: 2.7 % — HIGH (ref 0–1)
BILIRUB SERPL-MCNC: 0.9 MG/DL — SIGNIFICANT CHANGE UP (ref 0.2–1.2)
BUN SERPL-MCNC: 15 MG/DL — SIGNIFICANT CHANGE UP (ref 10–20)
CALCIUM SERPL-MCNC: 8.9 MG/DL — SIGNIFICANT CHANGE UP (ref 8.4–10.5)
CHLORIDE SERPL-SCNC: 97 MMOL/L — LOW (ref 98–110)
CO2 SERPL-SCNC: 30 MMOL/L — SIGNIFICANT CHANGE UP (ref 17–32)
CREAT SERPL-MCNC: 0.8 MG/DL — SIGNIFICANT CHANGE UP (ref 0.7–1.5)
EGFR: 90 ML/MIN/1.73M2 — SIGNIFICANT CHANGE UP
EOSINOPHIL # BLD AUTO: 0.01 K/UL — SIGNIFICANT CHANGE UP (ref 0–0.7)
EOSINOPHIL NFR BLD AUTO: 0.4 % — SIGNIFICANT CHANGE UP (ref 0–8)
GLUCOSE SERPL-MCNC: 93 MG/DL — SIGNIFICANT CHANGE UP (ref 70–99)
HCT VFR BLD CALC: 32.5 % — LOW (ref 42–52)
HGB BLD-MCNC: 10.3 G/DL — LOW (ref 14–18)
IMM GRANULOCYTES NFR BLD AUTO: 0.4 % — HIGH (ref 0.1–0.3)
LYMPHOCYTES # BLD AUTO: 1.06 K/UL — LOW (ref 1.2–3.4)
LYMPHOCYTES # BLD AUTO: 47.5 % — SIGNIFICANT CHANGE UP (ref 20.5–51.1)
MAGNESIUM SERPL-MCNC: 2.1 MG/DL — SIGNIFICANT CHANGE UP (ref 1.8–2.4)
MCHC RBC-ENTMCNC: 27.1 PG — SIGNIFICANT CHANGE UP (ref 27–31)
MCHC RBC-ENTMCNC: 31.7 G/DL — LOW (ref 32–37)
MCV RBC AUTO: 85.5 FL — SIGNIFICANT CHANGE UP (ref 80–94)
MONOCYTES # BLD AUTO: 0.55 K/UL — SIGNIFICANT CHANGE UP (ref 0.1–0.6)
MONOCYTES NFR BLD AUTO: 24.7 % — HIGH (ref 1.7–9.3)
NEUTROPHILS # BLD AUTO: 0.54 K/UL — LOW (ref 1.4–6.5)
NEUTROPHILS NFR BLD AUTO: 24.3 % — LOW (ref 42.2–75.2)
NRBC # BLD: 0 /100 WBCS — SIGNIFICANT CHANGE UP (ref 0–0)
NT-PROBNP SERPL-SCNC: 2311 PG/ML — HIGH (ref 0–300)
PLATELET # BLD AUTO: 39 K/UL — LOW (ref 130–400)
PMV BLD: 11.2 FL — HIGH (ref 7.4–10.4)
POTASSIUM SERPL-MCNC: 3.8 MMOL/L — SIGNIFICANT CHANGE UP (ref 3.5–5)
POTASSIUM SERPL-SCNC: 3.8 MMOL/L — SIGNIFICANT CHANGE UP (ref 3.5–5)
PROT SERPL-MCNC: 7.1 G/DL — SIGNIFICANT CHANGE UP (ref 6–8)
RBC # BLD: 3.8 M/UL — LOW (ref 4.7–6.1)
RBC # FLD: 16.9 % — HIGH (ref 11.5–14.5)
SODIUM SERPL-SCNC: 135 MMOL/L — SIGNIFICANT CHANGE UP (ref 135–146)
WBC # BLD: 2.23 K/UL — LOW (ref 4.8–10.8)
WBC # FLD AUTO: 2.23 K/UL — LOW (ref 4.8–10.8)

## 2024-03-14 PROCEDURE — 99239 HOSP IP/OBS DSCHRG MGMT >30: CPT

## 2024-03-14 RX ORDER — METOPROLOL TARTRATE 50 MG
1 TABLET ORAL
Qty: 30 | Refills: 0
Start: 2024-03-14 | End: 2024-04-12

## 2024-03-14 RX ORDER — LOSARTAN POTASSIUM 100 MG/1
1 TABLET, FILM COATED ORAL
Qty: 30 | Refills: 0
Start: 2024-03-14 | End: 2024-04-12

## 2024-03-14 RX ORDER — BUDESONIDE AND FORMOTEROL FUMARATE DIHYDRATE 160; 4.5 UG/1; UG/1
2 AEROSOL RESPIRATORY (INHALATION)
Qty: 1 | Refills: 0
Start: 2024-03-14 | End: 2024-03-14

## 2024-03-14 RX ORDER — PREGABALIN 225 MG/1
1 CAPSULE ORAL
Qty: 30 | Refills: 0
Start: 2024-03-14 | End: 2024-04-12

## 2024-03-14 RX ORDER — FUROSEMIDE 40 MG
1 TABLET ORAL
Qty: 30 | Refills: 0
Start: 2024-03-14 | End: 2024-04-12

## 2024-03-14 RX ADMIN — CHLORHEXIDINE GLUCONATE 1 APPLICATION(S): 213 SOLUTION TOPICAL at 05:30

## 2024-03-14 RX ADMIN — ENOXAPARIN SODIUM 40 MILLIGRAM(S): 100 INJECTION SUBCUTANEOUS at 05:30

## 2024-03-14 RX ADMIN — Medication 25 MILLIGRAM(S): at 05:30

## 2024-03-14 RX ADMIN — Medication 40 MILLIGRAM(S): at 05:30

## 2024-03-14 RX ADMIN — LOSARTAN POTASSIUM 25 MILLIGRAM(S): 100 TABLET, FILM COATED ORAL at 05:30

## 2024-03-14 RX ADMIN — Medication 100 MILLIGRAM(S): at 01:50

## 2024-03-14 NOTE — PROGRESS NOTE ADULT - SUBJECTIVE AND OBJECTIVE BOX
SONNIYA  79y  [facility] [address] A      Patient is a 79y old  Male who presents with a chief complaint of shortness of breath (12 Mar 2024 15:06)      INTERVAL HPI/OVERNIGHT EVENTS:    Patient feels better  still on oxygen which he usually doesn't use at home  no overnight events  family at the bedside updated about his condition       FAMILY HISTORY:  No pertinent family history in first degree relatives      T(C): 36.7 (03-13-24 @ 04:27), Max: 36.9 (03-12-24 @ 19:38)  HR: 82 (03-13-24 @ 04:27) (74 - 91)  BP: 109/63 (03-13-24 @ 04:27) (92/65 - 109/63)  RR: 18 (03-13-24 @ 08:17) (18 - 18)  SpO2: 96% (03-13-24 @ 08:17) (95% - 96%)  Wt(kg): --Vital Signs Last 24 Hrs  T(C): 36.7 (13 Mar 2024 04:27), Max: 36.9 (12 Mar 2024 19:38)  T(F): 98 (13 Mar 2024 04:27), Max: 98.5 (12 Mar 2024 19:38)  HR: 82 (13 Mar 2024 04:27) (74 - 91)  BP: 109/63 (13 Mar 2024 04:27) (92/65 - 109/63)  BP(mean): --  RR: 18 (13 Mar 2024 08:17) (18 - 18)  SpO2: 96% (13 Mar 2024 08:17) (95% - 96%)    Parameters below as of 13 Mar 2024 08:17  Patient On (Oxygen Delivery Method): room air        PHYSICAL EXAM:  GENERAL: Slightly cachectic , chronically ill   NERVOUS SYSTEM:  Alert & Oriented X3  PULM: Clear to auscultation bilaterally  CARDIAC: Regular rate and rhythm;  GI: Soft, Nontender, Nondistended; Bowel sounds present  EXTREMITIES:  2+ Peripheral Pulses,    Consultant(s) Notes Reviewed:  [x ] YES  [ ] NO  Care Discussed with Consultants/Other Providers [ x] YES  [ ] NO    LABS:                            10.0   2.10  )-----------( 32       ( 13 Mar 2024 04:30 )             31.5   03-13    139  |  100  |  18  ----------------------------<  96  3.3<L>   |  29  |  0.8    Ca    8.7      13 Mar 2024 04:30  Mg     2.2     03-13    TPro  6.7  /  Alb  3.2<L>  /  TBili  0.9  /  DBili  x   /  AST  11  /  ALT  13  /  AlkPhos  74  03-13            chlorhexidine 2% Cloths 1 Application(s) Topical <User Schedule>  cyanocobalamin 1000 MICROGram(s) Oral daily  enoxaparin Injectable 40 milliGRAM(s) SubCutaneous every 24 hours  furosemide    Tablet 40 milliGRAM(s) Oral daily  guaiFENesin Oral Liquid (Sugar-Free) 100 milliGRAM(s) Oral every 6 hours PRN  influenza  Vaccine (HIGH DOSE) 0.7 milliLiter(s) IntraMuscular once  losartan 25 milliGRAM(s) Oral daily  metoprolol succinate ER 25 milliGRAM(s) Oral daily        80 yo M with no significant PMH presents to ED for worsening BARRIENTOS.     1. Acute hypoxic respiratory failure secondary to suspected new onset acute systolic CHF and emphysema   - Telemetry                - ECG: Sinus with PVC                    - CXR: interstitial marking. BNP elevated   - Trop detectable   - Cont lasix 40mg IV daily   - Started on metoprolol 25 mg po daily   - Cont losartan 25mg po daily with parameters   * pt had low bp with entresto defer to outpatient to re-start   - start Symbicort bid   - Echo Left ventricular ejection fraction, by visual estimation, is 25 to 30%.  - CT Chest:  a) Emphysematous changes with architectural distortion.  b) Bilateral pleural effusions with compressive atelectasis.  c) Several bilateral masslike opacities within the lungs, nonspecific. Short-term follow-up after treatment with eventual PET/CT scanning as an outpatient if these structures do not respond.  - EP consulted:  a)  Once work up  of pancytopenia as per Hem-onc is performed, will need Ischemic w-up- prefer to be done as inpatient +Consider doing MRI heart once w-up is done  b) Will benefit from WCD. Discussed risk of Ventricular arrhythmia and risk of SCD w/non-compliance. Agreeable.  - Cardio consult appreciated     2. Pancytopenia  - peripheral smear: rare schistocytes giant platelets/platelet clumping, no spherocytosis,        - repeat dino positive but given Hb is stable , no need to treat hemolytic anemia at this time  - RUQ sono with No definite liver cirrhosis. Liver echogenic foci measuring up to 2 cm, likely hemangiomas. + splenomegaly   - cont vit b12         - MMA:elevated   - SPEP:pending          - Immunofixation:pending          - Free light chains: pending  - Flow cytometry: pending  - Bone marrow bx done  - Hematology consult appreciated       3. R cubital area swelling and erythema - improving ( likely infiltrative IV line)  - RUE venous Duplex No evidence of right upper extremity deep venous thrombosis. Superficial thrombophlebitis is visualized in right basilic vein.  compresses and elevate the arm       DVT ppx: lovenox    as per hem verbal reccs can give dvt ppx as long as Plt > 30 and no bleeding

## 2024-03-14 NOTE — DISCHARGE NOTE PROVIDER - HOSPITAL COURSE
80 yo with no PMH presented with worsening BARRIENTOS, orthopnea and b/l ankle edema. Pt was ex heavy smoker ( 1 pack per day > 30 yrs, stopped 8 yrs ago ), no alcohol use, no recent weight loss or night sweats. He did not follow u with a PCP for years.    He was Acute hypoxic respiratory failure secondary to suspected new onset acute systolic CHF and emphysema   - Telemetry                - ECG: Sinus with PVC                    - CXR: interstitial marking. BNP elevated   - Trop detectable   - Cont lasix 40mg IV daily   - Started on metoprolol 25 mg po daily   - Cont losartan 25mg po daily with parameters   * pt had low bp with entresto defer to outpatient to re-start   - start Symbicort bid   - Echo Left ventricular ejection fraction, by visual estimation, is 25 to 30%.  - CT Chest:  a) Emphysematous changes with architectural distortion.  b) Bilateral pleural effusions with compressive atelectasis.  c) Several bilateral masslike opacities within the lungs, nonspecific. Short-term follow-up after treatment with eventual PET/CT scanning as an outpatient if these structures do not respond.  - EP consulted:  a)  Once work up  of pancytopenia as per Hem-onc is performed, will need Ischemic w-up- prefer to be done as inpatient +Consider doing MRI heart once w-up is done  b) Will benefit from WCD. Discussed risk of Ventricular arrhythmia and risk of SCD w/non-compliance. Agreeable.  - Cardio consult appreciated     2. Pancytopenia  - peripheral smear: rare schistocytes giant platelets/platelet clumping, no spherocytosis,        - repeat dino positive but given Hb is stable , no need to treat hemolytic anemia at this time  - RUQ sono with No definite liver cirrhosis. Liver echogenic foci measuring up to 2 cm, likely hemangiomas. + splenomegaly   - cont vit b12         - MMA:elevated   - SPEP:pending          - Immunofixation:pending          - Free light chains: pending  - Flow cytometry: pending  - Bone marrow bx done  - Hematology consult appreciated       3. R cubital area swelling and erythema - improving ( likely infiltrative IV line)  - RUE venous Duplex No evidence of right upper extremity deep venous thrombosis. Superficial thrombophlebitis is visualized in right basilic vein.  compresses and elevate the arm       DVT ppx: lovenox    as per hem verbal reccs can give dvt ppx as long as Plt > 30 and no bleeding 80 yo with no PMH presented with worsening BARRIENTOS, orthopnea and b/l ankle edema. Pt was ex heavy smoker ( 1 pack per day > 30 yrs, stopped 8 yrs ago ), no alcohol use, no recent weight loss or night sweats. He did not follow up with a PCP for years.    1. Acute hypoxic respiratory failure secondary to suspected new onset acute systolic CHF and emphysema   - monitored on telemetry  - ECG: Sinus with PVC  - CXR: interstitial marking. BNP elevated   - Trop detectable   - received lasix 40mg IV daily for diuresis  - Started on metoprolol 25 mg po daily and losartan 25mg po daily with parameters   * pt had low bp with entresto defer to outpatient to re-start   - started Symbicort bid   - Echo Left ventricular ejection fraction, by visual estimation, is 25 to 30%.  - CT Chest:  a) Emphysematous changes with architectural distortion.  b) Bilateral pleural effusions with compressive atelectasis.  c) Several bilateral masslike opacities within the lungs, nonspecific. Short-term follow-up after treatment with eventual PET/CT scanning as an outpatient if these structures do not respond.  - EP consulted:  a)  Once work up  of pancytopenia as per Hem-onc is performed, will need Ischemic w-up  b) Discussed risk of Ventricular arrhythmia and risk of SCD w/non-compliance. Pt received WCD and will be discharged with it  - Cardio consult appreciated     2. Pancytopenia  - peripheral smear: rare schistocytes giant platelets/platelet clumping, no spherocytosis  - repeat dino positive but given Hb is stable , no need to treat hemolytic anemia at this time  - RUQ sono with No definite liver cirrhosis. Liver echogenic foci measuring up to 2 cm, likely hemangiomas. + splenomegaly   - cont vit b12         - MMA:elevated   - SPEP:pending          - Immunofixation:pending          - Free light chains: pending  - Flow cytometry: pending  - Bone marrow bx done  - Hematology consult appreciated     3. R cubital area swelling and erythema - improving ( likely infiltrative IV line)  - RUE venous Duplex No evidence of right upper extremity deep venous thrombosis. Superficial thrombophlebitis is visualized in right basilic vein.  compresses and elevate the arm 78 yo with no PMH presented with worsening BARRIENTOS, orthopnea and b/l ankle edema. Pt was ex heavy smoker ( 1 pack per day > 30 yrs, stopped 8 yrs ago ), no alcohol use, no recent weight loss or night sweats. He did not follow up with a PCP for years.    1. Acute hypoxic respiratory failure secondary to suspected new onset acute systolic CHF and emphysema   - monitored on telemetry  - ECG: Sinus with PVC  - CXR: interstitial marking. BNP elevated   - Trop detectable   - received lasix 40mg IV daily for diuresis. DC on lasix 40mg po daily  - DC on symbicort bid   - Started on metoprolol 25 mg po daily and losartan 25mg po daily with parameters   * pt had low bp with entresto defer to outpatient to re-start   - started Symbicort bid   - Echo Left ventricular ejection fraction, by visual estimation, is 25 to 30%.  - CT Chest:  a) Emphysematous changes with architectural distortion.  b) Bilateral pleural effusions with compressive atelectasis.  c) Several bilateral masslike opacities within the lungs, nonspecific. Short-term follow-up after treatment with eventual PET/CT scanning as an outpatient if these structures do not respond.  - EP consulted:  a)  Once work up  of pancytopenia as per Hem-onc is performed, will need Ischemic w-up  b) Discussed risk of Ventricular arrhythmia and risk of SCD w/non-compliance. Pt received WCD and will be discharged with it  - Cardio consult appreciated     2. Pancytopenia  - peripheral smear: rare schistocytes giant platelets/platelet clumping, no spherocytosis  - repeat dino positive but given Hb is stable , no need to treat hemolytic anemia at this time  - RUQ sono with No definite liver cirrhosis. Liver echogenic foci measuring up to 2 cm, likely hemangiomas. + splenomegaly   - cont vit b12. DC on vit b12 daily          - MMA:elevated   - SPEP:pending          - Immunofixation:pending          - Free light chains: pending  - Flow cytometry: pending  - Bone marrow bx done  - Hematology consult appreciated     3. R cubital area swelling and erythema - improving ( likely infiltrative IV line)  - RUE venous Duplex No evidence of right upper extremity deep venous thrombosis. Superficial thrombophlebitis is visualized in right basilic vein.  compresses and elevate the arm

## 2024-03-14 NOTE — DISCHARGE NOTE PROVIDER - CARE PROVIDER_API CALL
Zeke Pereyra  Internal Medicine  [address] [place] [address]  Phone: [phone]  Fax: [phone]  Follow Up Time: 2 weeks    Fredis Mckeon  Pulmonary Disease  [address] [place] [address]  Phone: [phone]  Fax: [phone]  Follow Up Time: 1 week   Fredis Mckeon  Pulmonary Disease  [address] [place] [address]  Phone: [phone]  Fax: [phone]  Follow Up Time: 1 week    Santi Diaz  Medical Oncology  [address] [place] [id number]  Phone: [phone]  Fax: [phone]  Follow Up Time: 1 week

## 2024-03-14 NOTE — DISCHARGE NOTE PROVIDER - NSDCMRMEDTOKEN_GEN_ALL_CORE_FT
budesonide-formoterol 160 mcg-4.5 mcg/inh inhalation aerosol: 2 puff(s) inhaled 2 times a day  cyanocobalamin 1000 mcg oral tablet: 1 tab(s) orally once a day  Lasix 40 mg oral tablet: 1 tab(s) orally once a day  losartan 25 mg oral tablet: 1 tab(s) orally once a day  metoprolol succinate 25 mg oral tablet, extended release: 1 tab(s) orally once a day

## 2024-03-14 NOTE — DISCHARGE NOTE PROVIDER - NSDCFUADDAPPT_GEN_ALL_CORE_FT
Vignesh Diaz's office tel # [phone] to made STAR appointment.  As per Brooklynn, appointment is March 28th at 2 pm.  stated someone from the office will contact the patient tomorrow to see if an earlier appointment can be made.

## 2024-03-14 NOTE — DISCHARGE NOTE PROVIDER - NSDCFUSCHEDAPPT_GEN_ALL_CORE_FT
Vignesh Rainey  [facility]  CARDIOLOGY [address]  Scheduled Appointment: 03/28/2024    Rula Conley  [facility]  ELECTROPH [address]  Scheduled Appointment: 05/09/2024

## 2024-03-14 NOTE — DISCHARGE NOTE PROVIDER - NSDCCPCAREPLAN_GEN_ALL_CORE_FT
PRINCIPAL DISCHARGE DIAGNOSIS  Diagnosis: SOB (shortness of breath)  Assessment and Plan of Treatment: You came to the hospital with SOB and we performed several tests which indicated that you have new onset heart failure. Due to your heart failure there was back flow of fluids into your lungs which resulted in your shortness of breath. We gave you diuretic medications to help your urinate this excess fluid out of your body. We started medications that are regularly used for patient with heart failure, particularly metoprolol and losartan. Please follow up with the cardiologist, Dr. Rainey, who saw your in the hospital, outpatient for further optimization for your heart failure medications, which may include entresto. Please wear the wearable cardiac defibrillator that you had fitted to help prevent your heart from going into a prolonged arrythmia. During your stay we also performed a CT of your chest which showed that you have emphysema which is damage to your lungs. Please follow-up outpatient with pulmonary to undergo further tests for your lungs and use the Symbicort inhaler at home.      SECONDARY DISCHARGE DIAGNOSES  Diagnosis: Pancytopenia  Assessment and Plan of Treatment: During your stay, the levels of multiple blood cells lines were decreased and we had hematology and oncology evaluate you. You underwent several blood tests and a bone marrow biopsy. Please follow up with them outpatient for these results.

## 2024-03-14 NOTE — DISCHARGE NOTE PROVIDER - PROVIDER TOKENS
PROVIDER:[TOKEN:[[id number]],FOLLOWUP:[2 weeks]],PROVIDER:[TOKEN:[[id number]],FOLLOWUP:[1 week]] PROVIDER:[TOKEN:[[id number]],FOLLOWUP:[1 week]],PROVIDER:[TOKEN:[[id number]],FOLLOWUP:[1 week]]

## 2024-03-14 NOTE — DISCHARGE NOTE PROVIDER - CARE PROVIDERS DIRECT ADDRESSES
[email or web address]

## 2024-03-14 NOTE — DISCHARGE NOTE PROVIDER - ATTENDING DISCHARGE PHYSICAL EXAMINATION:
VITALS:   T(C): 37.1 (03-14-24 @ 12:21), Max: 37.1 (03-14-24 @ 12:21)  HR: 87 (03-14-24 @ 12:21) (81 - 87)  BP: 105/- (03-14-24 @ 12:21) (103/59 - 126/63)  RR: 18 (03-14-24 @ 12:21) (18 - 18)  SpO2: 88% (03-14-24 @ 10:31) (88% - 88%)    GENERAL: muscle wasting   HEART: Regular rate and rhythm,   LUNGS: Faint crackles   ABDOMEN: Soft, nontender, nondistended, +BS  EXTREMITIES: 2+ peripheral pulses bilaterally.   NERVOUS SYSTEM:  A&Ox3,

## 2024-03-15 VITALS
DIASTOLIC BLOOD PRESSURE: 56 MMHG | HEART RATE: 80 BPM | TEMPERATURE: 97 F | SYSTOLIC BLOOD PRESSURE: 96 MMHG | RESPIRATION RATE: 18 BRPM

## 2024-03-15 RX ADMIN — LOSARTAN POTASSIUM 25 MILLIGRAM(S): 100 TABLET, FILM COATED ORAL at 05:10

## 2024-03-15 RX ADMIN — PREGABALIN 1000 MICROGRAM(S): 225 CAPSULE ORAL at 11:34

## 2024-03-15 RX ADMIN — ENOXAPARIN SODIUM 40 MILLIGRAM(S): 100 INJECTION SUBCUTANEOUS at 05:09

## 2024-03-15 RX ADMIN — BUDESONIDE AND FORMOTEROL FUMARATE DIHYDRATE 2 PUFF(S): 160; 4.5 AEROSOL RESPIRATORY (INHALATION) at 08:31

## 2024-03-15 RX ADMIN — Medication 25 MILLIGRAM(S): at 05:10

## 2024-03-15 RX ADMIN — CHLORHEXIDINE GLUCONATE 1 APPLICATION(S): 213 SOLUTION TOPICAL at 05:13

## 2024-03-15 RX ADMIN — Medication 40 MILLIGRAM(S): at 05:10

## 2024-03-15 NOTE — PROGRESS NOTE ADULT - SUBJECTIVE AND OBJECTIVE BOX
24H events:    Patient is a 79y old Male who presents with a chief complaint of shortness of breath (14 Mar 2024 12:09)    Primary diagnosis of SOB (shortness of breath)    Today is hospital day 10d. This morning patient was seen and examined at bedside, resting comfortably in bed.    No acute or major events overnight.    Family communication:  Contact date:  Name of person contacted:  Relationship to patient:  Communication details:  What matters most:    PAST MEDICAL & SURGICAL HISTORY  No pertinent past medical history    No significant past surgical history      SOCIAL HISTORY:  Social History:      ALLERGIES:  No Known Allergies    MEDICATIONS:  STANDING MEDICATIONS  budesonide 160 MICROgram(s)/formoterol 4.5 MICROgram(s) Inhaler 2 Puff(s) Inhalation two times a day  chlorhexidine 2% Cloths 1 Application(s) Topical <User Schedule>  cyanocobalamin 1000 MICROGram(s) Oral daily  enoxaparin Injectable 40 milliGRAM(s) SubCutaneous every 24 hours  furosemide   Injectable 40 milliGRAM(s) IV Push daily  influenza  Vaccine (HIGH DOSE) 0.7 milliLiter(s) IntraMuscular once  losartan 25 milliGRAM(s) Oral daily  metoprolol succinate ER 25 milliGRAM(s) Oral daily    PRN MEDICATIONS  guaiFENesin Oral Liquid (Sugar-Free) 100 milliGRAM(s) Oral every 6 hours PRN    VITALS:   T(F): 97.2  HR: 77  BP: 102/61  RR: 18  SpO2: 96%    PHYSICAL EXAM:  GENERAL:   ( x ) NAD, lying in bed comfortably     (  ) obtunded     (  ) lethargic     (  ) somnolent    HEAD:   ( x ) Atraumatic     (  ) hematoma     (  ) laceration (specify location:       )     NECK:  ( x ) Supple     (  ) neck stiffness     (  ) nuchal rigidity     (  )  no JVD     (  ) JVD present ( -- cm)    HEART:  Rate -->     ( x ) normal rate     (  ) bradycardic     (  ) tachycardic  Rhythm -->     ( x ) regular     (  ) regularly irregular     (  ) irregularly irregular  Murmurs -->     ( x ) normal s1s2     (  ) systolic murmur     (  ) diastolic murmur     (  ) continuous murmur      (  ) S3 present     (  ) S4 present    LUNGS:   ( x )Unlabored respirations     (  ) tachypnea  ( x ) B/L air entry     (  ) decreased breath sounds in:  (location     )    (  ) no adventitious sound     (  ) crackles     (  ) wheezing      (  ) rhonchi      (specify location:       )  (  ) chest wall tenderness (specify location:       )    ABDOMEN:   ( x ) Soft     (  ) tense   |   ( x ) nondistended     (  ) distended   |   ( x ) +BS     (  ) hypoactive bowel sounds     (  ) hyperactive bowel sounds  ( x ) nontender     (  ) RUQ tenderness     (  ) RLQ tenderness     (  ) LLQ tenderness     (  ) epigastric tenderness     (  ) diffuse tenderness  (  ) Splenomegaly      (  ) Hepatomegaly      (  ) Jaundice     (  ) ecchymosis     EXTREMITIES: RUE with phlebitis around anterior forearm  (  ) Normal     (  ) Rash     (  ) ecchymosis     (  ) varicose veins      (  ) pitting edema     (  ) non-pitting edema   (  ) ulceration     (  ) gangrene:     (location:     )    NERVOUS SYSTEM:    ( x ) A&Ox3     (  ) confused     (  ) lethargic  CN II-XII:     (  ) Intact     (  ) deficits found     (Specify:     )   Upper extremities:     (  ) no sensorimotor deficits     (  ) weakness     (  ) loss of proprioception/vibration     (  ) loss of touch/temperature (specify:    )  Lower extremities:     (  ) no sensorimotor deficits     (  ) weakness     (  ) loss of proprioception/vibration     (  ) loss of touch/temperature (specify:    )    SKIN:   ( x ) No rashes or lesions     (  ) maculopapular rash     (  ) pustules     (  ) vesicles     (  ) ulcer     (  ) ecchymosis     (specify location:     )    AMPAC score:    (  ) Indwelling Tadeo Catheter:   Date insterted:    Reason (  ) Critical illness     (  ) urinary retention    (  ) Accurate Ins/Outs Monitoring     (  ) CMO patient    (  ) Central Line:   Date inserted:  Location: (  ) Right IJ     (  ) Left IJ     (  ) Right Fem     (  ) Left Fem    (  ) SPC        (  ) pigtail       (  ) PEG tube       (  ) colostomy       (  ) jejunostomy  (  ) U-Dall    LABS:                        10.3   2.23  )-----------( 39       ( 14 Mar 2024 07:32 )             32.5     03-14    135  |  97<L>  |  15  ----------------------------<  93  3.8   |  30  |  0.8    Ca    8.9      14 Mar 2024 07:32  Mg     2.1     03-14    TPro  7.1  /  Alb  3.3<L>  /  TBili  0.9  /  DBili  x   /  AST  13  /  ALT  13  /  AlkPhos  77  03-14      Urinalysis Basic - ( 14 Mar 2024 07:32 )    Color: x / Appearance: x / SG: x / pH: x  Gluc: 93 mg/dL / Ketone: x  / Bili: x / Urobili: x   Blood: x / Protein: x / Nitrite: x   Leuk Esterase: x / RBC: x / WBC x   Sq Epi: x / Non Sq Epi: x / Bacteria: x

## 2024-03-15 NOTE — CHART NOTE - NSCHARTNOTEFT_GEN_A_CORE
Letter of Medical Necessity    Rolling Walker:  Patient requires rolling walker due to deconditioning for safe ambulation at discharge. Letter of Medical Necessity    Rolling Walker:  Patient requires rolling walker due to deconditioning for safe ambulation at discharge.    The beneficiary is able to safely use the walker. The functional mobility deficit can be sufficiently resolved with the use of a walker. Letter of Medical Necessity    Rolling Walker:  Patient requires rolling walker due to deconditioning for safe ambulation at discharge.    The beneficiary is able to safely use the walker. The functional mobility deficit can be sufficiently resolved with the use of a walker..

## 2024-03-15 NOTE — PROGRESS NOTE ADULT - ASSESSMENT
78 yo with no PMH presented with worsening BARRIENTOS, orthopnea and b/l ankle edema. Pt was ex heavy smoker ( 1 pack per day > 30 yrs, stopped 8 yrs ago ), no alcohol use, no recent weight loss or night sweats. He did not follow up with a PCP for years.    #Acute hypoxic respiratory failure secondary to suspected new onset acute systolic CHF and emphysema   - monitored on telemetry  - ECG: Sinus with PVC  - CXR: interstitial marking. BNP elevated   - Trop detectable   - received lasix 40mg IV daily for diuresis. DC on lasix 40mg po daily  - DC on symbicort bid   - Started on metoprolol 25 mg po daily and losartan 25mg po daily with parameters   * pt had low bp with entresto defer to outpatient to re-start   - started Symbicort bid   - Echo Left ventricular ejection fraction, by visual estimation, is 25 to 30%.  - CT Chest:  a) Emphysematous changes with architectural distortion.  b) Bilateral pleural effusions with compressive atelectasis.  c) Several bilateral masslike opacities within the lungs, nonspecific. Short-term follow-up after treatment with eventual PET/CT scanning as an outpatient if these structures do not respond.  - EP consulted:  a) Once work up  of pancytopenia as per Hem-onc is performed, will need Ischemic w-up  b) Discussed risk of Ventricular arrhythmia and risk of SCD w/non-compliance. Pt received WCD and will be discharged with it  - Cardio consult appreciated     #Pancytopenia  - peripheral smear: rare schistocytes giant platelets/platelet clumping, no spherocytosis  - repeat dino positive but given Hb is stable , no need to treat hemolytic anemia at this time  - RUQ sono with No definite liver cirrhosis. Liver echogenic foci measuring up to 2 cm, likely hemangiomas. + splenomegaly   - cont vit b12. DC on vit b12 daily          - MMA: elevated   - SPEP: pending          - Immunofixation: pending          - Free light chains: pending  - Flow cytometry: pending  - Bone marrow bx done  - Hematology consult appreciated     #R cubital area swelling and erythema - improving ( likely infiltrative IV line)  - RUE venous Duplex No evidence of right upper extremity deep venous thrombosis. Superficial thrombophlebitis is visualized in right basilic vein.  compresses and elevate the arm    DVT ppx:  80 yo with no PMH presented with worsening BARRIENTOS, orthopnea and b/l ankle edema. Pt was ex heavy smoker ( 1 pack per day > 30 yrs, stopped 8 yrs ago ), no alcohol use, no recent weight loss or night sweats. He did not follow up with a PCP for years.    #Acute hypoxic respiratory failure secondary to suspected new onset acute systolic CHF and emphysema   - monitored on telemetry  - ECG: Sinus with PVC  - CXR: interstitial marking. BNP elevated   - Trop detectable   - received lasix 40mg IV daily for diuresis. DC on lasix 40mg po daily  - DC on symbicort bid   - Started on metoprolol 25 mg po daily and losartan 25mg po daily with parameters   * pt had low bp with entresto defer to outpatient to re-start   - started Symbicort bid   - Echo Left ventricular ejection fraction, by visual estimation, is 25 to 30%.  - CT Chest:  a) Emphysematous changes with architectural distortion.  b) Bilateral pleural effusions with compressive atelectasis.  c) Several bilateral masslike opacities within the lungs, nonspecific. Short-term follow-up after treatment with eventual PET/CT scanning as an outpatient if these structures do not respond.  - EP consulted:  a) Once work up  of pancytopenia as per Hem-onc is performed, will need Ischemic w-up  b) Discussed risk of Ventricular arrhythmia and risk of SCD w/non-compliance. Pt received WCD and will be discharged with it  - Cardio consult appreciated     #Pancytopenia  - peripheral smear: rare schistocytes giant platelets/platelet clumping, no spherocytosis  - repeat dino positive but given Hb is stable , no need to treat hemolytic anemia at this time  - RUQ sono with No definite liver cirrhosis. Liver echogenic foci measuring up to 2 cm, likely hemangiomas. + splenomegaly   - cont vit b12. DC on vit b12 daily            - MMA: elevated   - SPEP: pending            - Immunofixation: pending            - Free light chains: pending  - Flow cytometry: pending  - Bone marrow bx done  - Hematology consult appreciated     #R cubital area swelling and erythema - improving ( likely infiltrative IV line)  - RUE venous Duplex No evidence of right upper extremity deep venous thrombosis. Superficial thrombophlebitis is visualized in right basilic vein.  compresses and elevate the arm    DVT ppx: Lovenox

## 2024-03-15 NOTE — PROGRESS NOTE ADULT - SUBJECTIVE AND OBJECTIVE BOX
NIYA CLINE  79y  [facility] 3[facility] 018 A      Patient is a 79y old  Male who presents with a chief complaint of shortness of breath (12 Mar 2024 15:06)      INTERVAL HPI/OVERNIGHT EVENTS:    Patient feels well  still waiting for portable oxygen for dc     FAMILY HISTORY:  No pertinent family history in first degree relatives      T(C): 36.7 (03-13-24 @ 04:27), Max: 36.9 (03-12-24 @ 19:38)  HR: 82 (03-13-24 @ 04:27) (74 - 91)  BP: 109/63 (03-13-24 @ 04:27) (92/65 - 109/63)  RR: 18 (03-13-24 @ 08:17) (18 - 18)  SpO2: 96% (03-13-24 @ 08:17) (95% - 96%)  Wt(kg): --Vital Signs Last 24 Hrs  T(C): 36.7 (13 Mar 2024 04:27), Max: 36.9 (12 Mar 2024 19:38)  T(F): 98 (13 Mar 2024 04:27), Max: 98.5 (12 Mar 2024 19:38)  HR: 82 (13 Mar 2024 04:27) (74 - 91)  BP: 109/63 (13 Mar 2024 04:27) (92/65 - 109/63)  BP(mean): --  RR: 18 (13 Mar 2024 08:17) (18 - 18)  SpO2: 96% (13 Mar 2024 08:17) (95% - 96%)    Parameters below as of 13 Mar 2024 08:17  Patient On (Oxygen Delivery Method): room air        PHYSICAL EXAM:  GENERAL: Slightly cachectic , chronically ill   NERVOUS SYSTEM:  Alert & Oriented X3  PULM: Clear to auscultation bilaterally  CARDIAC: Regular rate and rhythm;  GI: Soft, Nontender, Nondistended; Bowel sounds present  EXTREMITIES:  2+ Peripheral Pulses,    Consultant(s) Notes Reviewed:  [x ] YES  [ ] NO  Care Discussed with Consultants/Other Providers [ x] YES  [ ] NO    LABS:                            10.0   2.10  )-----------( 32       ( 13 Mar 2024 04:30 )             31.5   03-13    139  |  100  |  18  ----------------------------<  96  3.3<L>   |  29  |  0.8    Ca    8.7      13 Mar 2024 04:30  Mg     2.2     03-13    TPro  6.7  /  Alb  3.2<L>  /  TBili  0.9  /  DBili  x   /  AST  11  /  ALT  13  /  AlkPhos  74  03-13            chlorhexidine 2% Cloths 1 Application(s) Topical <User Schedule>  cyanocobalamin 1000 MICROGram(s) Oral daily  enoxaparin Injectable 40 milliGRAM(s) SubCutaneous every 24 hours  furosemide    Tablet 40 milliGRAM(s) Oral daily  guaiFENesin Oral Liquid (Sugar-Free) 100 milliGRAM(s) Oral every 6 hours PRN  influenza  Vaccine (HIGH DOSE) 0.7 milliLiter(s) IntraMuscular once  losartan 25 milliGRAM(s) Oral daily  metoprolol succinate ER 25 milliGRAM(s) Oral daily        78 yo with no PMH presented with worsening BARRIENTOS, orthopnea and b/l ankle edema. Pt was ex heavy smoker ( 1 pack per day > 30 yrs, stopped 8 yrs ago ), no alcohol use, no recent weight loss or night sweats. He did not follow up with a PCP for years.    1. Acute hypoxic respiratory failure secondary to suspected new onset acute systolic CHF and emphysema   - monitored on telemetry  - ECG: Sinus with PVC  - CXR: interstitial marking. BNP elevated   - Trop detectable   - received lasix 40mg IV daily for diuresis. DC on lasix 40mg po daily  - DC on symbicort bid   - Started on metoprolol 25 mg po daily and losartan 25mg po daily with parameters   * pt had low bp with entresto defer to outpatient to re-start   - started Symbicort bid   - Echo Left ventricular ejection fraction, by visual estimation, is 25 to 30%.  - CT Chest:  a) Emphysematous changes with architectural distortion.  b) Bilateral pleural effusions with compressive atelectasis.  c) Several bilateral masslike opacities within the lungs, nonspecific. Short-term follow-up after treatment with eventual PET/CT scanning as an outpatient if these structures do not respond.  - EP consulted:  a)  Once work up  of pancytopenia as per Hem-onc is performed, will need Ischemic w-up  b) Discussed risk of Ventricular arrhythmia and risk of SCD w/non-compliance. Pt received WCD and will be discharged with it  - Cardio consult appreciated     2. Pancytopenia  - peripheral smear: rare schistocytes giant platelets/platelet clumping, no spherocytosis  - repeat dino positive but given Hb is stable , no need to treat hemolytic anemia at this time  - RUQ sono with No definite liver cirrhosis. Liver echogenic foci measuring up to 2 cm, likely hemangiomas. + splenomegaly   - cont vit b12. DC on vit b12 daily          - MMA:elevated   - SPEP:pending          - Immunofixation:pending          - Free light chains: pending  - Flow cytometry: pending  - Bone marrow bx done  - Hematology consult appreciated     3. R cubital area swelling and erythema - improving ( likely infiltrative IV line)  - RUE venous Duplex No evidence of right upper extremity deep venous thrombosis. Superficial thrombophlebitis is visualized in right basilic vein.  compresses and elevate the arm      Pt is medically clear for dc awaiting portable oxygen

## 2024-03-15 NOTE — PROGRESS NOTE ADULT - PROVIDER SPECIALTY LIST ADULT
Heme/Onc
Hospitalist
Hospitalist
Internal Medicine
Internal Medicine
Heme/Onc
Heme/Onc
Hospitalist
Hospitalist
Internal Medicine

## 2024-03-18 ENCOUNTER — NON-APPOINTMENT (OUTPATIENT)
Age: 79
End: 2024-03-18

## 2024-03-18 ENCOUNTER — TRANSCRIPTION ENCOUNTER (OUTPATIENT)
Age: 79
End: 2024-03-18

## 2024-03-18 LAB
CHROM ANALY INTERPHASE BLD FISH-IMP: SIGNIFICANT CHANGE UP
CHROM ANALY OVERALL INTERP SPEC-IMP: SIGNIFICANT CHANGE UP
TM INTERPRETATION: SIGNIFICANT CHANGE UP

## 2024-03-21 ENCOUNTER — APPOINTMENT (OUTPATIENT)
Dept: CARE COORDINATION | Facility: HOME HEALTH | Age: 79
End: 2024-03-21

## 2024-03-21 DIAGNOSIS — R91.1 SOLITARY PULMONARY NODULE: ICD-10-CM

## 2024-03-21 DIAGNOSIS — D61.818 OTHER PANCYTOPENIA: ICD-10-CM

## 2024-03-21 DIAGNOSIS — I80.8 PHLEBITIS AND THROMBOPHLEBITIS OF OTHER SITES: ICD-10-CM

## 2024-03-21 DIAGNOSIS — I42.9 CARDIOMYOPATHY, UNSPECIFIED: ICD-10-CM

## 2024-03-21 DIAGNOSIS — D18.03 HEMANGIOMA OF INTRA-ABDOMINAL STRUCTURES: ICD-10-CM

## 2024-03-21 DIAGNOSIS — J44.9 CHRONIC OBSTRUCTIVE PULMONARY DISEASE, UNSPECIFIED: ICD-10-CM

## 2024-03-21 DIAGNOSIS — J96.01 ACUTE RESPIRATORY FAILURE WITH HYPOXIA: ICD-10-CM

## 2024-03-21 DIAGNOSIS — J43.9 EMPHYSEMA, UNSPECIFIED: ICD-10-CM

## 2024-03-21 DIAGNOSIS — R16.1 SPLENOMEGALY, NOT ELSEWHERE CLASSIFIED: ICD-10-CM

## 2024-03-21 DIAGNOSIS — I34.0 NONRHEUMATIC MITRAL (VALVE) INSUFFICIENCY: ICD-10-CM

## 2024-03-21 DIAGNOSIS — I49.3 VENTRICULAR PREMATURE DEPOLARIZATION: ICD-10-CM

## 2024-03-21 DIAGNOSIS — J98.11 ATELECTASIS: ICD-10-CM

## 2024-03-21 DIAGNOSIS — Z87.891 PERSONAL HISTORY OF NICOTINE DEPENDENCE: ICD-10-CM

## 2024-03-21 DIAGNOSIS — D69.6 THROMBOCYTOPENIA, UNSPECIFIED: ICD-10-CM

## 2024-03-21 DIAGNOSIS — I50.21 ACUTE SYSTOLIC (CONGESTIVE) HEART FAILURE: ICD-10-CM

## 2024-03-22 LAB — CHROM ANALY OVERALL INTERP SPEC-IMP: SIGNIFICANT CHANGE UP

## 2024-03-26 LAB — CHROM ANALY INTERPHASE BLD FISH-IMP: SIGNIFICANT CHANGE UP

## 2024-03-28 ENCOUNTER — APPOINTMENT (OUTPATIENT)
Dept: PULMONOLOGY | Facility: CLINIC | Age: 79
End: 2024-03-28
Payer: MEDICARE

## 2024-03-28 ENCOUNTER — APPOINTMENT (OUTPATIENT)
Dept: CARDIOLOGY | Facility: CLINIC | Age: 79
End: 2024-03-28
Payer: MEDICARE

## 2024-03-28 VITALS
SYSTOLIC BLOOD PRESSURE: 118 MMHG | WEIGHT: 129 LBS | BODY MASS INDEX: 19.55 KG/M2 | OXYGEN SATURATION: 92 % | HEART RATE: 43 BPM | DIASTOLIC BLOOD PRESSURE: 68 MMHG | HEIGHT: 68 IN

## 2024-03-28 VITALS
HEART RATE: 87 BPM | HEIGHT: 68 IN | SYSTOLIC BLOOD PRESSURE: 112 MMHG | WEIGHT: 129 LBS | BODY MASS INDEX: 19.55 KG/M2 | DIASTOLIC BLOOD PRESSURE: 70 MMHG

## 2024-03-28 DIAGNOSIS — Z82.49 FAMILY HISTORY OF ISCHEMIC HEART DISEASE AND OTHER DISEASES OF THE CIRCULATORY SYSTEM: ICD-10-CM

## 2024-03-28 DIAGNOSIS — Z78.9 OTHER SPECIFIED HEALTH STATUS: ICD-10-CM

## 2024-03-28 DIAGNOSIS — Z87.891 PERSONAL HISTORY OF NICOTINE DEPENDENCE: ICD-10-CM

## 2024-03-28 DIAGNOSIS — D69.6 THROMBOCYTOPENIA, UNSPECIFIED: ICD-10-CM

## 2024-03-28 DIAGNOSIS — Z80.6 FAMILY HISTORY OF LEUKEMIA: ICD-10-CM

## 2024-03-28 PROCEDURE — 99204 OFFICE O/P NEW MOD 45 MIN: CPT

## 2024-03-28 PROCEDURE — 93000 ELECTROCARDIOGRAM COMPLETE: CPT

## 2024-03-28 PROCEDURE — 99214 OFFICE O/P EST MOD 30 MIN: CPT

## 2024-03-28 RX ORDER — BUDESONIDE AND FORMOTEROL FUMARATE DIHYDRATE 160; 4.5 UG/1; UG/1
160-4.5 AEROSOL RESPIRATORY (INHALATION) TWICE DAILY
Refills: 0 | Status: ACTIVE | COMMUNITY

## 2024-03-28 NOTE — PHYSICAL EXAM
[No Acute Distress] : no acute distress [Normal] : no edema, no cyanosis, no clubbing, no varicosities [Moves all extremities] : moves all extremities [Alert and Oriented] : alert and oriented [de-identified] : decrease bilateral airways entries.

## 2024-03-28 NOTE — REVIEW OF SYSTEMS
[Fever] : no fever [SOB] : no shortness of breath [Chills] : no chills [Chest Discomfort] : no chest discomfort [Syncope] : no syncope [Palpitations] : no palpitations [Cough] : no cough [Wheezing] : no wheezing [Convulsions] : no convulsions [Confusion] : no confusion was observed [Negative] : Gastrointestinal

## 2024-03-28 NOTE — DISCUSSION/SUMMARY
[FreeTextEntry1] : - COPD ex-smoker stopped 8 years ago smoked more than 40 years Keep inhalers Plan PFTs His pulse ox was 88% on room air at rest needs to stay on oxygen -Multiple lung opacities high risk Repeat CAT scan in 6 weeks -Heart failure reduced EF followed by cardiologist Poor prognosis

## 2024-03-28 NOTE — PHYSICAL EXAM
[Normal Oropharynx] : normal oropharynx [Normal Appearance] : normal appearance [No Neck Mass] : no neck mass [Normal S1, S2] : normal s1, s2 [Normal Rate/Rhythm] : normal rate/rhythm [No Murmurs] : no murmurs [No Abnormalities] : no abnormalities [Benign] : benign [Normal Gait] : normal gait [No Clubbing] : no clubbing [No Cyanosis] : no cyanosis [No Edema] : no edema [FROM] : FROM [Normal Color/ Pigmentation] : normal color/ pigmentation [No Focal Deficits] : no focal deficits [Oriented x3] : oriented x3 [Normal Affect] : normal affect [TextBox_2] : ill looking [TextBox_68] : dec bs both bases

## 2024-03-28 NOTE — HISTORY OF PRESENT ILLNESS
[TextBox_4] : 79 years old presented for above was admitted to the hospital for shortness of breath prior to that did not see any doctor.  Underwent investigation which included echo which showed his EF around 25% cardiology saw him was diuresed and discharged home on LifeVest.  He had a CAT scan of the lungs done which show bilateral pleural effusion and multiple lung opacity no old CAT scan for comparison diagnosed with COPD discharged on Symbicort as well as oxygen and he came in for follow-up visit all over he feels better he is compliant with his inhalers and medication

## 2024-03-28 NOTE — REASON FOR VISIT
[Symptom and Test Evaluation] : symptom and test evaluation [Cardiac Failure] : cardiac failure [CV Risk Factors and Non-Cardiac Disease] : CV risk factors and non-cardiac disease

## 2024-04-03 ENCOUNTER — APPOINTMENT (OUTPATIENT)
Dept: CARDIOLOGY | Facility: CLINIC | Age: 79
End: 2024-04-03

## 2024-04-08 ENCOUNTER — LABORATORY RESULT (OUTPATIENT)
Age: 79
End: 2024-04-08

## 2024-04-08 ENCOUNTER — OUTPATIENT (OUTPATIENT)
Dept: OUTPATIENT SERVICES | Facility: HOSPITAL | Age: 79
LOS: 1 days | End: 2024-04-08
Payer: MEDICARE

## 2024-04-08 ENCOUNTER — APPOINTMENT (OUTPATIENT)
Age: 79
End: 2024-04-08
Payer: MEDICARE

## 2024-04-08 VITALS
TEMPERATURE: 97.5 F | DIASTOLIC BLOOD PRESSURE: 77 MMHG | HEIGHT: 65 IN | BODY MASS INDEX: 21.33 KG/M2 | RESPIRATION RATE: 16 BRPM | SYSTOLIC BLOOD PRESSURE: 123 MMHG | WEIGHT: 128 LBS | HEART RATE: 101 BPM

## 2024-04-08 DIAGNOSIS — D51.9 VITAMIN B12 DEFICIENCY ANEMIA, UNSPECIFIED: ICD-10-CM

## 2024-04-08 DIAGNOSIS — D61.818 OTHER PANCYTOPENIA: ICD-10-CM

## 2024-04-08 PROCEDURE — 36415 COLL VENOUS BLD VENIPUNCTURE: CPT

## 2024-04-08 PROCEDURE — 86880 COOMBS TEST DIRECT: CPT

## 2024-04-08 PROCEDURE — 80076 HEPATIC FUNCTION PANEL: CPT

## 2024-04-08 PROCEDURE — 83615 LACTATE (LD) (LDH) ENZYME: CPT

## 2024-04-08 PROCEDURE — 99215 OFFICE O/P EST HI 40 MIN: CPT

## 2024-04-08 PROCEDURE — 85046 RETICYTE/HGB CONCENTRATE: CPT

## 2024-04-08 PROCEDURE — 83010 ASSAY OF HAPTOGLOBIN QUANT: CPT

## 2024-04-08 PROCEDURE — 82668 ASSAY OF ERYTHROPOIETIN: CPT

## 2024-04-08 PROCEDURE — 85027 COMPLETE CBC AUTOMATED: CPT

## 2024-04-08 PROCEDURE — 80048 BASIC METABOLIC PNL TOTAL CA: CPT

## 2024-04-08 NOTE — REASON FOR VISIT
[Initial Consultation] : an initial consultation [Family Member] : family member [FreeTextEntry2] : pancytopenia

## 2024-04-08 NOTE — HISTORY OF PRESENT ILLNESS
[de-identified] : 80 yo with no PMH presents for eval of new onset shortness of breath for the past few days. Patient reported that he started feeling SOB for the past few days at rest and on exertion and it was progressively worsening where he couldn't sleep for the past 2 nights bcz of orthopnea. He reported associated bilateral ankle swelling. Denies chest pain, palpitations, fever, chills, abdominal pain. This is the first time he experiences such sxs. He is usually an active man, never felt SOB or chest pain on exertion or climbing up the stairs. No known sick contacts, no recent travel, ex heavy smoker ( 1 pack per day > 30 yrs, stopped 8 yrs ago ), no alcohol use, no recent weight loss or night sweats. He did not follow u with a PCP for years.  Have not been seen by a PMD for ? number of years  BONE MARROW BIOPSY  Collected Date/Time:                   3/12/2024 14:30 EDT Received Date/Time:                    3/12/2024 15:22 EDT  Hematopathology Addendum Report - Auth (Verified)  Hematopathology Addendum This addendum is to report the results of iron stain, immunostains, cytogenetic studies and NGS panel.  SPECIAL STAINS: Iron stain (block 1A, Core): Absent, no ring sideroblasts identified.  IMMUNOSTAINS PERFORMED ON CLOT SECTIONS BLOCK 2A: CD34: Rare precursors present, <1%. Factor VIII: Increased megakaryocytes, mixed normal and hypolobated forms. : Positive in precursors, approximately 10%. E-cadherin: Positive in the erythroid precursors, approximately 30%.  MDS FISH ([id number]):   NORMAL FISH  Chromosome analysis ([id number]):   Normal karyotype Karyotype: 46,XY[20]  OnkoMyelo NGS panel ([id number] performed at [facility]): No mutations detected.  Cytogenetics and molecular studies fail to reveal cloanl evidence of myeloid neoplasm. Therefore, the patient's pancytopenia and bone marrow  changes are more likely attributed to folate/vitamin B12 deficiency or reactive changes resulting from the peripheral destruction of blood cells. Verified by: Kenya Quesada MD (Electronic Signature) Reported on: 03/27/24 14:01 EDT, [facility], [address] [place] [address] Phone: [phone]   Fax: [phone] _________________________________________________________________  Hematopathology Report - Auth (Verified)  Specimen(s) Submitted 1  Bone marrow biopsy 2  Bone marrow clot 3  Bone marrow aspirate slides (4)  Final Diagnosis BONE MARROW (ASPIRATE, CLOT AND CORE): - Hypercellular marrow (80-85%) with decreased, left-shifted trilineage hematopoiesis, granulocytic and megakaryocytic dysplasia, increased number of monocytes with left- shifted maturation. - Flow cytometry: 8.5% left-shifted monocytes with partial CD56 expression and 1.2% myeloblasts. -See comment.  COMMENT: According to the patient's chart, he presented with pancytopenia along with mild monocytosis ranging from 10.7% to 24.7% and an absolute count of 0.26 -0.91K/uL, as indicated by CBC results from 3/6/2024 to 3/14/2024. Upon bone marrow examination, an increased number of monocytes with left shift towards immaturity, 6% promonocytes, and granulocytic/megakaryocytic dysplasia are observed. 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 The potential diagnoses include chronic myelomonocytic leukemia and myelodysplastic neoplasm. However, a definitive diagnosis requires correlation with clinical presentation and pending cytogenetic/molecular studies.  MICROSCOPIC DESCRIPTION: BONE MARROW DIFFERENTIAL: 200 cells Promonocytes: 6% Myelocytes/Metamyelocytes: 13% Bands/Neutrophils: 7% Eosinophils: 2% Monocytes: 29% Plasma cells: 2% Lymphocytes: 8% Erythroid Precursors: 33% M:E ratio: 1.7:1   BONE MARROW ASPIRATE: Quality: Adequate. Megakaryocytes: Present with dysplastic morphology: hypogranulation, hypolobation with  nuclei. Granulocytes: Decreased with left-shifted maturation and dysplastic morphology: hypolobation and hypogranulation. Erythroids: Relatively increased with progressive maturation and unremarkable morphology. Other: Increased monocytes with atypical morphology: megakaryocytic changes and folded nuclei.  BONE MARROW CORE BIOPSY: Quality: Limited, composed of trabecular bone and limited marrow. See findings in the clot section.  BONE MARROW CLOT SECTION: Quality: Adequate. Cellularity: Hypercellular, 80-85%. Megakaryocytes: Normal in number with hypolobated forms present. Granulocytes: Decreased with left-shifted maturation. Erythroids: Relatively increased with progressive maturation. Other: Increased number of atypical monocytes.  ANCILLARY STUDIES: IRON STUDIES: Iron studies performed on aspirate, core and clot. Storage Iron: Pending. Ring Sideroblasts: Pending.  IMMUNOHISTOCHEMISTRY ON CLOT SECTION (2A): CD3: Positive in scattered small T cells CD4: Positive in small T cells and monomyelocytic cells. CD5: Positive in scattered small T cells. CD7: Positive in scattered T cells. CD15: Positive in neutrophils. CD20: Positive in scattered small B cells. CD30: Negative in hematolymphoid cells. CD45: Positive in  hematolymphoid cells. CD 64: Positive in monocytes. CD79a: Positive in small B cells. Mum 1: Positive in scattered B cells and plasma cells. PAX5: Positive in scattered small B cells.  IN-SITU HYBRIDIZATION ON CLOT SECTION (2A): EBV MELCHOR: Negative.  FLOW CYTOMETRY: Flow cytometry performed on a concurrent bone marrow aspirate (35-ZZ-): Diagnosis/Interpretation: - There is an increase of monocytes (8.7%) with left-shift towards immaturity and partial CD56 expression. Myeloblasts are not increased, 1.2% without immunophenotypic aberrancies. The above findings can be seen in low-grade monomyelocytic neoplasms or reactive conditions. Correlation with clinical and  cytogenetic/molecular studies is recommended for further evaluation and interpretation.  KARYOTYPE/CYTOGENETICS: Pending Verified by: Kenya Quesada MD (Electronic Signature) Reported on: 03/18/24 14:53 EDT, [facility], [address] [place] [address] Phone: [phone]   Fax: [phone] _________________________________________________________________   Clinical Information R/O MDS, pancytopenia  Gross Description 1.   The specimen is received in formalin labeled "bone marrow biopsy" and consists of a cylindrical segment of firm white bone, measuring  0.9  cm in length and 0.2 cm in diameter. The entire specimen is submitted after decalcification. (1 block)  2.  The specimen is received in formalin labeled "bone marrow clot" and consists of a blood clot measuring  2 x 1.2 x 0.5 cm in aggregate. The entire specimen is submitted. (1 block)  3.   Also received are four (4) bone marrow slides for review.  03/12/2024 15:37:04 EDT FA  Disclaimer In addition to other data that may appear on the specimen containers, all labels have been inspected to confirm the presence of the patient's name and date of birth.   Specimen was received and underwent gross examination at [facility], [address] [place] [address].

## 2024-04-08 NOTE — ASSESSMENT
[FreeTextEntry1] : 80 yo man who chose not to follow with PMD for years, is initially evaluated for pancyutopenia.  Bone marrow biopsy 3/12/24 was done and initially revealed suspicion for MDS; however, the review by hematopathology made a decision that the findings are more consistent NOT with myeloid neoplasm, but rather  deficiency of vitamin b12 or folate. In fact, his B12 level was normal but MMA is elevated ; the latter is more specific for megaloblastic anemia. Therefore, will initiate IM b12 daily x5, f/b weekly x5, f/b monthly; in addition, start oral folate daily reevaluate in 6-8 weeks

## 2024-04-09 DIAGNOSIS — D61.818 OTHER PANCYTOPENIA: ICD-10-CM

## 2024-04-09 LAB
ALBUMIN SERPL ELPH-MCNC: 3.7 G/DL
ALP BLD-CCNC: 117 U/L
ALT SERPL-CCNC: 11 U/L
ANION GAP SERPL CALC-SCNC: 13 MMOL/L
AST SERPL-CCNC: 14 U/L
BILIRUB DIRECT SERPL-MCNC: 0.2 MG/DL
BILIRUB INDIRECT SERPL-MCNC: 0.6 MG/DL
BILIRUB SERPL-MCNC: 0.8 MG/DL
BUN SERPL-MCNC: 19 MG/DL
CALCIUM SERPL-MCNC: 8.9 MG/DL
CHLORIDE SERPL-SCNC: 102 MMOL/L
CO2 SERPL-SCNC: 22 MMOL/L
CREAT SERPL-MCNC: 0.9 MG/DL
DAT POLY: ABNORMAL
EGFR: 87 ML/MIN/1.73M2
GLUCOSE SERPL-MCNC: 101 MG/DL
HAPTOGLOB SERPL-MCNC: 112 MG/DL
HCT VFR BLD CALC: 29.8 %
HGB BLD-MCNC: 9.9 G/DL
LDH SERPL-CCNC: 226 U/L
MCHC RBC-ENTMCNC: 26.9 PG
MCHC RBC-ENTMCNC: 33.2 G/DL
MCV RBC AUTO: 81 FL
PLATELET # BLD AUTO: 45 K/UL
PMV BLD: 10.6 FL
POTASSIUM SERPL-SCNC: 4.7 MMOL/L
PROT SERPL-MCNC: 8.1 G/DL
RBC # BLD: 3.68 M/UL
RBC # FLD: 18.2 %
RETICS # AUTO: 0.7 %
RETICS AGGREG/RBC NFR: 24.7 K/UL
SODIUM SERPL-SCNC: 137 MMOL/L
WBC # FLD AUTO: 2.54 K/UL

## 2024-04-10 LAB — EPO SERPL-MCNC: 9 MIU/ML

## 2024-04-15 ENCOUNTER — OUTPATIENT (OUTPATIENT)
Dept: OUTPATIENT SERVICES | Facility: HOSPITAL | Age: 79
LOS: 1 days | End: 2024-04-15
Payer: MEDICARE

## 2024-04-15 ENCOUNTER — APPOINTMENT (OUTPATIENT)
Age: 79
End: 2024-04-15

## 2024-04-15 DIAGNOSIS — D61.818 OTHER PANCYTOPENIA: ICD-10-CM

## 2024-04-15 PROCEDURE — 96372 THER/PROPH/DIAG INJ SC/IM: CPT

## 2024-04-15 RX ORDER — CYANOCOBALAMIN (VITAMIN B-12) 1000 MCG
1000 TABLET, EXTENDED RELEASE ORAL ONCE
Refills: 0 | Status: COMPLETED | OUTPATIENT
Start: 2024-04-15 | End: 2024-04-15

## 2024-04-15 RX ADMIN — Medication 1000 MICROGRAM(S): at 10:51

## 2024-04-16 ENCOUNTER — NON-APPOINTMENT (OUTPATIENT)
Age: 79
End: 2024-04-16

## 2024-04-16 ENCOUNTER — APPOINTMENT (OUTPATIENT)
Dept: CARDIOLOGY | Facility: CLINIC | Age: 79
End: 2024-04-16
Payer: MEDICARE

## 2024-04-16 DIAGNOSIS — I42.8 OTHER CARDIOMYOPATHIES: ICD-10-CM

## 2024-04-16 DIAGNOSIS — I50.20 UNSPECIFIED SYSTOLIC (CONGESTIVE) HEART FAILURE: ICD-10-CM

## 2024-04-16 PROCEDURE — 93306 TTE W/DOPPLER COMPLETE: CPT

## 2024-04-23 PROBLEM — I42.8 OTHER CARDIOMYOPATHY: Status: ACTIVE | Noted: 2024-03-28

## 2024-04-23 PROBLEM — I50.20 HEART FAILURE WITH REDUCED EJECTION FRACTION, NYHA CLASS IV: Status: ACTIVE | Noted: 2024-03-28

## 2024-05-09 ENCOUNTER — APPOINTMENT (OUTPATIENT)
Dept: ELECTROPHYSIOLOGY | Facility: CLINIC | Age: 79
End: 2024-05-09
Payer: MEDICARE

## 2024-05-09 VITALS
WEIGHT: 132 LBS | HEIGHT: 68 IN | BODY MASS INDEX: 20 KG/M2 | TEMPERATURE: 97.1 F | DIASTOLIC BLOOD PRESSURE: 70 MMHG | SYSTOLIC BLOOD PRESSURE: 116 MMHG | HEART RATE: 79 BPM

## 2024-05-09 DIAGNOSIS — Z78.9 OTHER SPECIFIED HEALTH STATUS: ICD-10-CM

## 2024-05-09 PROCEDURE — 99215 OFFICE O/P EST HI 40 MIN: CPT

## 2024-05-09 PROCEDURE — G2211 COMPLEX E/M VISIT ADD ON: CPT

## 2024-05-09 PROCEDURE — 93000 ELECTROCARDIOGRAM COMPLETE: CPT

## 2024-05-09 RX ORDER — ALBUTEROL SULFATE 90 UG/1
108 (90 BASE) INHALANT RESPIRATORY (INHALATION) EVERY 4 HOURS
Qty: 1 | Refills: 3 | Status: COMPLETED | COMMUNITY
Start: 2024-03-28 | End: 2024-05-09

## 2024-05-29 ENCOUNTER — APPOINTMENT (OUTPATIENT)
Age: 79
End: 2024-05-29
Payer: MEDICARE

## 2024-05-29 ENCOUNTER — LABORATORY RESULT (OUTPATIENT)
Age: 79
End: 2024-05-29

## 2024-05-29 VITALS
HEART RATE: 76 BPM | SYSTOLIC BLOOD PRESSURE: 119 MMHG | WEIGHT: 137 LBS | BODY MASS INDEX: 20.76 KG/M2 | TEMPERATURE: 97.6 F | RESPIRATION RATE: 16 BRPM | DIASTOLIC BLOOD PRESSURE: 65 MMHG | HEIGHT: 68 IN | OXYGEN SATURATION: 97 %

## 2024-05-29 DIAGNOSIS — D61.818 OTHER PANCYTOPENIA: ICD-10-CM

## 2024-05-29 LAB
ALBUMIN SERPL ELPH-MCNC: 3.7 G/DL
ALP BLD-CCNC: 102 U/L
ALT SERPL-CCNC: 7 U/L
ANION GAP SERPL CALC-SCNC: 11 MMOL/L
AST SERPL-CCNC: 12 U/L
BILIRUB DIRECT SERPL-MCNC: 0.2 MG/DL
BILIRUB INDIRECT SERPL-MCNC: 0.4 MG/DL
BILIRUB SERPL-MCNC: 0.6 MG/DL
BUN SERPL-MCNC: 17 MG/DL
CALCIUM SERPL-MCNC: 9.2 MG/DL
CHLORIDE SERPL-SCNC: 104 MMOL/L
CO2 SERPL-SCNC: 26 MMOL/L
CREAT SERPL-MCNC: 0.9 MG/DL
EGFR: 87 ML/MIN/1.73M2
GLUCOSE SERPL-MCNC: 78 MG/DL
HCT VFR BLD CALC: 31.4 %
HGB BLD-MCNC: 10.4 G/DL
MCHC RBC-ENTMCNC: 26.9 PG
MCHC RBC-ENTMCNC: 33.1 G/DL
MCV RBC AUTO: 81.3 FL
PLATELET # BLD AUTO: 48 K/UL
PMV BLD: 9.7 FL
POTASSIUM SERPL-SCNC: 4.1 MMOL/L
PROT SERPL-MCNC: 7.8 G/DL
RBC # BLD: 3.86 M/UL
RBC # FLD: 16.4 %
SODIUM SERPL-SCNC: 141 MMOL/L
WBC # FLD AUTO: 2.37 K/UL

## 2024-05-29 PROCEDURE — 99215 OFFICE O/P EST HI 40 MIN: CPT

## 2024-05-29 NOTE — ASSESSMENT
[FreeTextEntry1] : 80 yo man who chose not to follow with PMD for years, is initially evaluated for pancyutopenia.  Bone marrow biopsy 3/12/24 was done and initially revealed suspicion for MDS; however, the review by hematopathology made a decision that the findings are more consistent NOT with myeloid neoplasm, but rather  deficiency of vitamin b12 or folate. In fact, his B12 level was normal but MMA is elevated ; the latter is more specific for megaloblastic anemia. Therefore,  initiated IM b12 daily x5, f/b weekly x5, f/b monthly; in addition, start oral folate daily. Unfortunately,, CBC does not show improvement in WBC/RBC/ Platelets; I still suspect there may be a component of MDS (low grade based on bone marrow biopsy report; his EPO level is 9 10/2024; recommend a trial of procrit 10,000 units weekly reevaluate in 6-8 weeks

## 2024-05-29 NOTE — CONSULT LETTER
[Dear  ___] : Dear  [unfilled], [Consult Letter:] : I had the pleasure of evaluating your patient, [unfilled]. [Please see my note below.] : Please see my note below. [Sincerely,] : Sincerely, [FreeTextEntry3] : Travon Cummins DO Attending Physician, Hematology/ Medical Oncology [phone] office

## 2024-05-29 NOTE — HISTORY OF PRESENT ILLNESS
[de-identified] : 78 yo with no PMH presents for eval of new onset shortness of breath for the past few days. Patient reported that he started feeling SOB for the past few days at rest and on exertion and it was progressively worsening where he couldn't sleep for the past 2 nights bcz of orthopnea. He reported associated bilateral ankle swelling. Denies chest pain, palpitations, fever, chills, abdominal pain. This is the first time he experiences such sxs. He is usually an active man, never felt SOB or chest pain on exertion or climbing up the stairs. No known sick contacts, no recent travel, ex heavy smoker ( 1 pack per day > 30 yrs, stopped 8 yrs ago ), no alcohol use, no recent weight loss or night sweats. He did not follow u with a PCP for years.  Have not been seen by a PMD for ? number of years  BONE MARROW BIOPSY  Collected Date/Time:                   3/12/2024 14:30 EDT Received Date/Time:                    3/12/2024 15:22 EDT  Hematopathology Addendum Report - Auth (Verified)  Hematopathology Addendum This addendum is to report the results of iron stain, immunostains, cytogenetic studies and NGS panel.  SPECIAL STAINS: Iron stain (block 1A, Core): Absent, no ring sideroblasts identified.  IMMUNOSTAINS PERFORMED ON CLOT SECTIONS BLOCK 2A: CD34: Rare precursors present, <1%. Factor VIII: Increased megakaryocytes, mixed normal and hypolobated forms. : Positive in precursors, approximately 10%. E-cadherin: Positive in the erythroid precursors, approximately 30%.  MDS FISH ([id number]):   NORMAL FISH  Chromosome analysis ([id number]):   Normal karyotype Karyotype: 46,XY[20]  OnkoMyelo NGS panel ([id number] performed at [facility]): No mutations detected.  Cytogenetics and molecular studies fail to reveal cloanl evidence of myeloid neoplasm. Therefore, the patient's pancytopenia and bone marrow  changes are more likely attributed to folate/vitamin B12 deficiency or reactive changes resulting from the peripheral destruction of blood cells. Verified by: Kenya Quesada MD (Electronic Signature) Reported on: 03/27/24 14:01 EDT, [facility], [address] [place] [address] Phone: [phone]   Fax: [phone] _________________________________________________________________  Hematopathology Report - Auth (Verified)  Specimen(s) Submitted 1  Bone marrow biopsy 2  Bone marrow clot 3  Bone marrow aspirate slides (4)  Final Diagnosis BONE MARROW (ASPIRATE, CLOT AND CORE): - Hypercellular marrow (80-85%) with decreased, left-shifted trilineage hematopoiesis, granulocytic and megakaryocytic dysplasia, increased number of monocytes with left- shifted maturation. - Flow cytometry: 8.5% left-shifted monocytes with partial CD56 expression and 1.2% myeloblasts. -See comment.  COMMENT: According to the patient's chart, he presented with pancytopenia along with mild monocytosis ranging from 10.7% to 24.7% and an absolute count of 0.26 -0.91K/uL, as indicated by CBC results from 3/6/2024 to 3/14/2024. Upon bone marrow examination, an increased number of monocytes with left shift towards immaturity, 6% promonocytes, and granulocytic/megakaryocytic dysplasia are observed. 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 The potential diagnoses include chronic myelomonocytic leukemia and myelodysplastic neoplasm. However, a definitive diagnosis requires correlation with clinical presentation and pending cytogenetic/molecular studies.  MICROSCOPIC DESCRIPTION: BONE MARROW DIFFERENTIAL: 200 cells Promonocytes: 6% Myelocytes/Metamyelocytes: 13% Bands/Neutrophils: 7% Eosinophils: 2% Monocytes: 29% Plasma cells: 2% Lymphocytes: 8% Erythroid Precursors: 33% M:E ratio: 1.7:1   BONE MARROW ASPIRATE: Quality: Adequate. Megakaryocytes: Present with dysplastic morphology: hypogranulation, hypolobation with  nuclei. Granulocytes: Decreased with left-shifted maturation and dysplastic morphology: hypolobation and hypogranulation. Erythroids: Relatively increased with progressive maturation and unremarkable morphology. Other: Increased monocytes with atypical morphology: megakaryocytic changes and folded nuclei.  BONE MARROW CORE BIOPSY: Quality: Limited, composed of trabecular bone and limited marrow. See findings in the clot section.  BONE MARROW CLOT SECTION: Quality: Adequate. Cellularity: Hypercellular, 80-85%. Megakaryocytes: Normal in number with hypolobated forms present. Granulocytes: Decreased with left-shifted maturation. Erythroids: Relatively increased with progressive maturation. Other: Increased number of atypical monocytes.  ANCILLARY STUDIES: IRON STUDIES: Iron studies performed on aspirate, core and clot. Storage Iron: Pending. Ring Sideroblasts: Pending.  IMMUNOHISTOCHEMISTRY ON CLOT SECTION (2A): CD3: Positive in scattered small T cells CD4: Positive in small T cells and monomyelocytic cells. CD5: Positive in scattered small T cells. CD7: Positive in scattered T cells. CD15: Positive in neutrophils. CD20: Positive in scattered small B cells. CD30: Negative in hematolymphoid cells. CD45: Positive in  hematolymphoid cells. CD 64: Positive in monocytes. CD79a: Positive in small B cells. Mum 1: Positive in scattered B cells and plasma cells. PAX5: Positive in scattered small B cells.  IN-SITU HYBRIDIZATION ON CLOT SECTION (2A): EBV MELCHOR: Negative.  FLOW CYTOMETRY: Flow cytometry performed on a concurrent bone marrow aspirate (65-CA-): Diagnosis/Interpretation: - There is an increase of monocytes (8.7%) with left-shift towards immaturity and partial CD56 expression. Myeloblasts are not increased, 1.2% without immunophenotypic aberrancies. The above findings can be seen in low-grade monomyelocytic neoplasms or reactive conditions. Correlation with clinical and  cytogenetic/molecular studies is recommended for further evaluation and interpretation.  KARYOTYPE/CYTOGENETICS: Pending Verified by: Kenya Quesada MD (Electronic Signature) Reported on: 03/18/24 14:53 EDT, [facility], [address] [place] [address] Phone: [phone]   Fax: [phone] _________________________________________________________________   Clinical Information R/O MDS, pancytopenia  Gross Description 1.   The specimen is received in formalin labeled "bone marrow biopsy" and consists of a cylindrical segment of firm white bone, measuring  0.9  cm in length and 0.2 cm in diameter. The entire specimen is submitted after decalcification. (1 block)  2.  The specimen is received in formalin labeled "bone marrow clot" and consists of a blood clot measuring  2 x 1.2 x 0.5 cm in aggregate. The entire specimen is submitted. (1 block)  3.   Also received are four (4) bone marrow slides for review.  03/12/2024 15:37:04 EDT FA  Disclaimer In addition to other data that may appear on the specimen containers, all labels have been inspected to confirm the presence of the patient's name and date of birth.   Specimen was received and underwent gross examination at [facility], [address] [place] [address].

## 2024-05-31 NOTE — HISTORY OF PRESENT ILLNESS
[FreeTextEntry1] : Mr. Yoo is a 79-year-old male with PMHx of NICM, thrombocytopenia, smoking, s/p wearable defibrillator, is here for follow-up.   BARRIENTOS.   Feels slightly better.  Denies chest pain, shortness of breath, palpitation, dizziness or LOC except noted above.  EKG (05/09/2024): SR @ 79, , , QTc 435 Echo (04/2024): EF 30-35%, moderate LV dysfunction, mild LAE.  Cardio: Dr. Rainey

## 2024-05-31 NOTE — ADDENDUM
[FreeTextEntry1] : Patricia LAW assisted in documentation on 05/31/2024 acting as a scribe for Dr. Rula Conley.

## 2024-06-03 LAB — METHYLMALONATE SERPL-SCNC: 199 NMOL/L

## 2024-06-05 ENCOUNTER — RESULT REVIEW (OUTPATIENT)
Age: 79
End: 2024-06-05

## 2024-06-05 ENCOUNTER — OUTPATIENT (OUTPATIENT)
Dept: OUTPATIENT SERVICES | Facility: HOSPITAL | Age: 79
LOS: 1 days | End: 2024-06-05
Payer: MEDICARE

## 2024-06-05 DIAGNOSIS — R91.1 SOLITARY PULMONARY NODULE: ICD-10-CM

## 2024-06-05 DIAGNOSIS — Z00.8 ENCOUNTER FOR OTHER GENERAL EXAMINATION: ICD-10-CM

## 2024-06-05 PROCEDURE — 71250 CT THORAX DX C-: CPT

## 2024-06-05 PROCEDURE — 71250 CT THORAX DX C-: CPT | Mod: 26

## 2024-06-06 DIAGNOSIS — R91.1 SOLITARY PULMONARY NODULE: ICD-10-CM

## 2024-06-10 ENCOUNTER — APPOINTMENT (OUTPATIENT)
Dept: CARDIOLOGY | Facility: CLINIC | Age: 79
End: 2024-06-10
Payer: MEDICARE

## 2024-06-10 PROCEDURE — 93306 TTE W/DOPPLER COMPLETE: CPT

## 2024-06-12 ENCOUNTER — APPOINTMENT (OUTPATIENT)
Age: 79
End: 2024-06-12

## 2024-06-12 ENCOUNTER — OUTPATIENT (OUTPATIENT)
Dept: OUTPATIENT SERVICES | Facility: HOSPITAL | Age: 79
LOS: 1 days | End: 2024-06-12
Payer: MEDICARE

## 2024-06-12 ENCOUNTER — LABORATORY RESULT (OUTPATIENT)
Age: 79
End: 2024-06-12

## 2024-06-12 ENCOUNTER — APPOINTMENT (OUTPATIENT)
Dept: PULMONOLOGY | Facility: CLINIC | Age: 79
End: 2024-06-12

## 2024-06-12 VITALS
SYSTOLIC BLOOD PRESSURE: 130 MMHG | OXYGEN SATURATION: 95 % | HEART RATE: 80 BPM | BODY MASS INDEX: 24.45 KG/M2 | RESPIRATION RATE: 14 BRPM | WEIGHT: 138 LBS | HEIGHT: 63 IN | DIASTOLIC BLOOD PRESSURE: 74 MMHG

## 2024-06-12 DIAGNOSIS — D61.818 OTHER PANCYTOPENIA: ICD-10-CM

## 2024-06-12 DIAGNOSIS — J44.9 CHRONIC OBSTRUCTIVE PULMONARY DISEASE, UNSPECIFIED: ICD-10-CM

## 2024-06-12 DIAGNOSIS — R06.02 SHORTNESS OF BREATH: ICD-10-CM

## 2024-06-12 LAB
HCT VFR BLD CALC: 33.2 %
HGB BLD-MCNC: 10.9 G/DL
MCHC RBC-ENTMCNC: 26.3 PG
MCHC RBC-ENTMCNC: 32.8 G/DL
MCV RBC AUTO: 80.2 FL
PLATELET # BLD AUTO: 54 K/UL
PMV BLD: NORMAL
RBC # BLD: 4.14 M/UL
RBC # FLD: 16.4 %
WBC # FLD AUTO: 2.86 K/UL

## 2024-06-12 PROCEDURE — 96372 THER/PROPH/DIAG INJ SC/IM: CPT

## 2024-06-12 PROCEDURE — 99213 OFFICE O/P EST LOW 20 MIN: CPT | Mod: 25

## 2024-06-12 PROCEDURE — 94010 BREATHING CAPACITY TEST: CPT

## 2024-06-12 PROCEDURE — 94729 DIFFUSING CAPACITY: CPT

## 2024-06-12 PROCEDURE — G2211 COMPLEX E/M VISIT ADD ON: CPT

## 2024-06-12 PROCEDURE — 85027 COMPLETE CBC AUTOMATED: CPT

## 2024-06-12 PROCEDURE — 94727 GAS DIL/WSHOT DETER LNG VOL: CPT

## 2024-06-12 RX ORDER — EPOETIN ALFA 3000 [IU]/ML
10000 SOLUTION INTRAVENOUS; SUBCUTANEOUS ONCE
Refills: 0 | Status: COMPLETED | OUTPATIENT
Start: 2024-06-12 | End: 2024-06-12

## 2024-06-12 RX ADMIN — EPOETIN ALFA 10000 UNIT(S): 3000 SOLUTION INTRAVENOUS; SUBCUTANEOUS at 17:04

## 2024-06-12 NOTE — PHYSICAL EXAM
[Normal Oropharynx] : normal oropharynx [Normal Appearance] : normal appearance [No Neck Mass] : no neck mass [Normal Rate/Rhythm] : normal rate/rhythm [Normal S1, S2] : normal s1, s2 [No Murmurs] : no murmurs [No Abnormalities] : no abnormalities [Benign] : benign [Normal Gait] : normal gait [No Clubbing] : no clubbing [No Cyanosis] : no cyanosis [No Edema] : no edema [FROM] : FROM [Normal Color/ Pigmentation] : normal color/ pigmentation [No Focal Deficits] : no focal deficits [Oriented x3] : oriented x3 [Normal Affect] : normal affect [TextBox_2] : ill looking [TextBox_68] : dec bs both bases

## 2024-06-12 NOTE — DISCUSSION/SUMMARY
[FreeTextEntry1] : - COPD ex-smoker stopped 8 years ago smoked more than 40 years renew inhalers pft moderate obstructive pattern His pulse ox was 88% on room air at rest needs to stay on oxygen -Multiple lung opacities high risk Repeat CAT scan noted -Heart failure reduced EF followed by cardiologist Poor prognosis

## 2024-06-13 DIAGNOSIS — D61.818 OTHER PANCYTOPENIA: ICD-10-CM

## 2024-06-19 ENCOUNTER — LABORATORY RESULT (OUTPATIENT)
Age: 79
End: 2024-06-19

## 2024-06-19 ENCOUNTER — OUTPATIENT (OUTPATIENT)
Dept: OUTPATIENT SERVICES | Facility: HOSPITAL | Age: 79
LOS: 1 days | End: 2024-06-19
Payer: MEDICARE

## 2024-06-19 ENCOUNTER — APPOINTMENT (OUTPATIENT)
Age: 79
End: 2024-06-19

## 2024-06-19 VITALS
RESPIRATION RATE: 16 BRPM | DIASTOLIC BLOOD PRESSURE: 77 MMHG | SYSTOLIC BLOOD PRESSURE: 123 MMHG | HEART RATE: 68 BPM | TEMPERATURE: 97.9 F

## 2024-06-19 DIAGNOSIS — D61.818 OTHER PANCYTOPENIA: ICD-10-CM

## 2024-06-19 LAB
HCT VFR BLD CALC: 33.4 %
HGB BLD-MCNC: 10.7 G/DL
MCHC RBC-ENTMCNC: 26.4 PG
MCHC RBC-ENTMCNC: 32 G/DL
MCV RBC AUTO: 82.3 FL
PLATELET # BLD AUTO: 57 K/UL
PMV BLD: 11.1 FL
RBC # BLD: 4.06 M/UL
RBC # FLD: 17.4 %
WBC # FLD AUTO: 2.75 K/UL

## 2024-06-19 PROCEDURE — 96372 THER/PROPH/DIAG INJ SC/IM: CPT

## 2024-06-19 PROCEDURE — 85027 COMPLETE CBC AUTOMATED: CPT

## 2024-06-19 RX ORDER — EPOETIN ALFA 3000 [IU]/ML
10000 SOLUTION INTRAVENOUS; SUBCUTANEOUS ONCE
Refills: 0 | Status: COMPLETED | OUTPATIENT
Start: 2024-06-19 | End: 2024-06-19

## 2024-06-19 RX ADMIN — EPOETIN ALFA 10000 UNIT(S): 3000 SOLUTION INTRAVENOUS; SUBCUTANEOUS at 14:09

## 2024-06-20 ENCOUNTER — APPOINTMENT (OUTPATIENT)
Dept: ELECTROPHYSIOLOGY | Facility: CLINIC | Age: 79
End: 2024-06-20
Payer: MEDICARE

## 2024-06-20 VITALS
WEIGHT: 134 LBS | DIASTOLIC BLOOD PRESSURE: 70 MMHG | TEMPERATURE: 98 F | SYSTOLIC BLOOD PRESSURE: 118 MMHG | BODY MASS INDEX: 23.74 KG/M2 | HEART RATE: 84 BPM | HEIGHT: 63 IN

## 2024-06-20 PROCEDURE — 99214 OFFICE O/P EST MOD 30 MIN: CPT

## 2024-06-20 PROCEDURE — 93000 ELECTROCARDIOGRAM COMPLETE: CPT

## 2024-06-20 PROCEDURE — G2211 COMPLEX E/M VISIT ADD ON: CPT

## 2024-06-20 RX ORDER — BUDESONIDE AND FORMOTEROL FUMARATE DIHYDRATE 160; 4.5 UG/1; UG/1
160-4.5 AEROSOL RESPIRATORY (INHALATION) TWICE DAILY
Qty: 1 | Refills: 3 | Status: COMPLETED | COMMUNITY
Start: 2024-06-12 | End: 2024-06-20

## 2024-06-20 RX ORDER — BUDESONIDE AND FORMOTEROL FUMARATE DIHYDRATE 160; 4.5 UG/1; UG/1
160-4.5 AEROSOL RESPIRATORY (INHALATION) TWICE DAILY
Qty: 1 | Refills: 3 | Status: COMPLETED | COMMUNITY
Start: 2024-03-28 | End: 2024-06-20

## 2024-06-20 RX ORDER — LOSARTAN POTASSIUM 25 MG/1
25 TABLET, FILM COATED ORAL DAILY
Qty: 90 | Refills: 3 | Status: ACTIVE | COMMUNITY

## 2024-06-20 RX ORDER — CYANOCOBALAMIN
1000 KIT
Qty: 20 | Refills: 1 | Status: ACTIVE | COMMUNITY
Start: 2024-04-08

## 2024-06-20 RX ORDER — FUROSEMIDE 40 MG/1
40 TABLET ORAL
Qty: 90 | Refills: 0 | Status: ACTIVE | COMMUNITY

## 2024-06-20 RX ORDER — FOLIC ACID 1 MG/1
1 TABLET ORAL
Qty: 30 | Refills: 5 | Status: ACTIVE | COMMUNITY
Start: 2024-04-08

## 2024-06-20 RX ORDER — METOPROLOL SUCCINATE 50 MG/1
50 TABLET, EXTENDED RELEASE ORAL
Qty: 90 | Refills: 3 | Status: ACTIVE | COMMUNITY

## 2024-06-20 NOTE — ADDENDUM
[FreeTextEntry1] : Patricia LAW assisted in documentation on 06/20/2024 acting as a scribe for Dr. Rula Conley.

## 2024-06-20 NOTE — ASSESSMENT
[FreeTextEntry1] : ## NICM ## Thrombocytopenia   - Compliant with wearable defibrillator. No events of wearable defibrillator.  - Discussed compliance with the patient.  - Continue with Metoprolol Succinate 50 mg. - No other medications due to low BP for now. - Hematology referral for thrombocytopenia.   - EF is better. Will DC wearable defibrillator.  - Will do MRI to assess cardiomyopathy. - MCOT to assess PVC/NSVT burden.  - Ischemic workup as per cardiology. - RTC in 5 weeks.

## 2024-06-20 NOTE — HISTORY OF PRESENT ILLNESS
[FreeTextEntry1] : Mr. Yoo is a 79-year-old male with PMHx of NICM, thrombocytopenia, smoking, s/p wearable defibrillator, is here for follow-up.   BARRIENTOS.   Feels slightly better.  06/20/2024: Feels much better    Denies chest pain, shortness of breath, palpitation, dizziness or LOC except noted above.  EKG (06/20/2024): SR @ 84, , , QTc 431  EKG (05/09/2024): SR @ 79, , , QTc 435 Echo (05/2024): EF 40%, mild LVH, mild LAE, mild JOSE DANIEL  Echo (04/2024): EF 30-35%, moderate LV dysfunction, mild LAE.  Cardio: Dr. Rainey

## 2024-06-26 ENCOUNTER — APPOINTMENT (OUTPATIENT)
Age: 79
End: 2024-06-26

## 2024-07-03 ENCOUNTER — APPOINTMENT (OUTPATIENT)
Age: 79
End: 2024-07-03

## 2024-07-10 ENCOUNTER — APPOINTMENT (OUTPATIENT)
Age: 79
End: 2024-07-10

## 2024-07-17 ENCOUNTER — APPOINTMENT (OUTPATIENT)
Dept: CARDIOLOGY | Facility: CLINIC | Age: 79
End: 2024-07-17
Payer: MEDICARE

## 2024-07-17 VITALS
DIASTOLIC BLOOD PRESSURE: 78 MMHG | SYSTOLIC BLOOD PRESSURE: 118 MMHG | WEIGHT: 134 LBS | HEIGHT: 63 IN | HEART RATE: 80 BPM | BODY MASS INDEX: 23.74 KG/M2

## 2024-07-17 DIAGNOSIS — I42.8 OTHER CARDIOMYOPATHIES: ICD-10-CM

## 2024-07-17 DIAGNOSIS — D61.818 OTHER PANCYTOPENIA: ICD-10-CM

## 2024-07-17 DIAGNOSIS — I50.20 UNSPECIFIED SYSTOLIC (CONGESTIVE) HEART FAILURE: ICD-10-CM

## 2024-07-17 PROCEDURE — 99214 OFFICE O/P EST MOD 30 MIN: CPT

## 2024-07-17 PROCEDURE — 93000 ELECTROCARDIOGRAM COMPLETE: CPT

## 2024-07-17 RX ORDER — FUROSEMIDE 20 MG/1
20 TABLET ORAL
Qty: 90 | Refills: 0 | Status: ACTIVE | COMMUNITY
Start: 2024-07-17 | End: 1900-01-01

## 2024-07-17 RX ORDER — LACTOBACILLUS ACIDOPHILUS/PECT 30 MG-20MG
TABLET ORAL DAILY
Refills: 0 | Status: ACTIVE | COMMUNITY

## 2024-07-22 ENCOUNTER — APPOINTMENT (OUTPATIENT)
Age: 79
End: 2024-07-22

## 2024-07-24 ENCOUNTER — APPOINTMENT (OUTPATIENT)
Dept: ELECTROPHYSIOLOGY | Facility: CLINIC | Age: 79
End: 2024-07-24

## 2024-07-24 ENCOUNTER — APPOINTMENT (OUTPATIENT)
Age: 79
End: 2024-07-24

## 2024-07-24 VITALS
HEIGHT: 63 IN | DIASTOLIC BLOOD PRESSURE: 70 MMHG | SYSTOLIC BLOOD PRESSURE: 122 MMHG | BODY MASS INDEX: 23.74 KG/M2 | HEART RATE: 86 BPM | WEIGHT: 134 LBS

## 2024-07-24 PROCEDURE — G2211 COMPLEX E/M VISIT ADD ON: CPT

## 2024-07-24 PROCEDURE — 93000 ELECTROCARDIOGRAM COMPLETE: CPT

## 2024-07-24 PROCEDURE — 99214 OFFICE O/P EST MOD 30 MIN: CPT

## 2024-07-24 NOTE — ADDENDUM
[FreeTextEntry1] : Patricia LAW assisted in documentation on 07/24/2024 acting as a scribe for Dr. Rula Conley.

## 2024-07-24 NOTE — HISTORY OF PRESENT ILLNESS
[FreeTextEntry1] : Mr. Yoo is a 79-year-old male with PMHx of NICM, thrombocytopenia, smoking, s/p wearable defibrillator, is here for follow-up.   BARRIENTOS.   Feels slightly better.  06/20/2024: Feels much better   07/24/2024: Feels fine. Had a nuclear stress test. Result pending. MRI scheduled to be in September.     Denies chest pain, shortness of breath, palpitation, dizziness or LOC except noted above.  EKG (07/24/2024): SR @ 86, , , QTc 452, PVC   EKG (06/20/2024): SR @ 84, , , QTc 431  EKG (05/09/2024): SR @ 79, , , QTc 435 Echo (05/2024): EF 40%, mild LVH, mild LAE, mild JOSE DANIEL  Echo (04/2024): EF 30-35%, moderate LV dysfunction, mild LAE.  Cardio: Dr. Rainey

## 2024-07-24 NOTE — ASSESSMENT
[FreeTextEntry1] : ## NICM ## Thrombocytopenia   - Compliant with wearable defibrillator. No events of wearable defibrillator.  - Discussed compliance with the patient.  - Continue with Metoprolol Succinate 50 mg. - No other medications due to low BP for now. - Hematology referral for thrombocytopenia.   - EF is better. Awaiting MRI. Scheduled to be in September. - MCOT showed PVC burden of 5% with NSVT.  - Awaiting nuclear stress test results.  - Echo in 6 months with cardiologist. - RTC if MRI is abnormal.

## 2024-08-21 ENCOUNTER — NON-APPOINTMENT (OUTPATIENT)
Age: 79
End: 2024-08-21

## 2024-09-04 ENCOUNTER — APPOINTMENT (OUTPATIENT)
Dept: CARDIOLOGY | Facility: CLINIC | Age: 79
End: 2024-09-04

## 2024-09-18 ENCOUNTER — OUTPATIENT (OUTPATIENT)
Dept: OUTPATIENT SERVICES | Facility: HOSPITAL | Age: 79
LOS: 1 days | End: 2024-09-18
Payer: MEDICARE

## 2024-09-18 DIAGNOSIS — I42.8 OTHER CARDIOMYOPATHIES: ICD-10-CM

## 2024-09-18 PROCEDURE — 75561 CARDIAC MRI FOR MORPH W/DYE: CPT

## 2024-09-18 PROCEDURE — A9579: CPT

## 2024-09-18 PROCEDURE — 75561 CARDIAC MRI FOR MORPH W/DYE: CPT | Mod: 26,MH

## 2024-09-19 DIAGNOSIS — I42.8 OTHER CARDIOMYOPATHIES: ICD-10-CM

## 2024-09-30 ENCOUNTER — RX RENEWAL (OUTPATIENT)
Age: 79
End: 2024-09-30

## 2024-10-17 ENCOUNTER — APPOINTMENT (OUTPATIENT)
Dept: CARDIOLOGY | Facility: CLINIC | Age: 79
End: 2024-10-17

## 2024-10-17 ENCOUNTER — RX RENEWAL (OUTPATIENT)
Age: 79
End: 2024-10-17

## 2024-10-29 ENCOUNTER — NON-APPOINTMENT (OUTPATIENT)
Age: 79
End: 2024-10-29

## 2024-11-01 ENCOUNTER — OUTPATIENT (OUTPATIENT)
Dept: OUTPATIENT SERVICES | Facility: HOSPITAL | Age: 79
LOS: 1 days | End: 2024-11-01
Payer: MEDICARE

## 2024-11-01 VITALS
HEART RATE: 68 BPM | RESPIRATION RATE: 18 BRPM | WEIGHT: 123.9 LBS | OXYGEN SATURATION: 97 % | TEMPERATURE: 97 F | DIASTOLIC BLOOD PRESSURE: 68 MMHG | HEIGHT: 64 IN | SYSTOLIC BLOOD PRESSURE: 126 MMHG

## 2024-11-01 DIAGNOSIS — Z98.49 CATARACT EXTRACTION STATUS, UNSPECIFIED EYE: Chronic | ICD-10-CM

## 2024-11-01 DIAGNOSIS — I50.22 CHRONIC SYSTOLIC (CONGESTIVE) HEART FAILURE: ICD-10-CM

## 2024-11-01 DIAGNOSIS — Z01.818 ENCOUNTER FOR OTHER PREPROCEDURAL EXAMINATION: ICD-10-CM

## 2024-11-01 LAB
ALBUMIN SERPL ELPH-MCNC: 3.7 G/DL — SIGNIFICANT CHANGE UP (ref 3.5–5.2)
ALP SERPL-CCNC: 105 U/L — SIGNIFICANT CHANGE UP (ref 30–115)
ALT FLD-CCNC: 18 U/L — SIGNIFICANT CHANGE UP (ref 0–41)
ANION GAP SERPL CALC-SCNC: 12 MMOL/L — SIGNIFICANT CHANGE UP (ref 7–14)
APTT BLD: 33.6 SEC — SIGNIFICANT CHANGE UP (ref 27–39.2)
AST SERPL-CCNC: 31 U/L — SIGNIFICANT CHANGE UP (ref 0–41)
BASOPHILS # BLD AUTO: 0 K/UL — SIGNIFICANT CHANGE UP (ref 0–0.2)
BASOPHILS NFR BLD AUTO: 0 % — SIGNIFICANT CHANGE UP (ref 0–1)
BILIRUB SERPL-MCNC: 1.4 MG/DL — HIGH (ref 0.2–1.2)
BUN SERPL-MCNC: 12 MG/DL — SIGNIFICANT CHANGE UP (ref 10–20)
CALCIUM SERPL-MCNC: 9.2 MG/DL — SIGNIFICANT CHANGE UP (ref 8.4–10.5)
CHLORIDE SERPL-SCNC: 102 MMOL/L — SIGNIFICANT CHANGE UP (ref 98–110)
CO2 SERPL-SCNC: 25 MMOL/L — SIGNIFICANT CHANGE UP (ref 17–32)
CREAT SERPL-MCNC: 0.8 MG/DL — SIGNIFICANT CHANGE UP (ref 0.7–1.5)
EGFR: 90 ML/MIN/1.73M2 — SIGNIFICANT CHANGE UP
EOSINOPHIL # BLD AUTO: 0 K/UL — SIGNIFICANT CHANGE UP (ref 0–0.7)
EOSINOPHIL NFR BLD AUTO: 0 % — SIGNIFICANT CHANGE UP (ref 0–8)
GLUCOSE SERPL-MCNC: 70 MG/DL — SIGNIFICANT CHANGE UP (ref 70–99)
HCT VFR BLD CALC: 33.9 % — LOW (ref 42–52)
HGB BLD-MCNC: 10.3 G/DL — LOW (ref 14–18)
INR BLD: 1.05 RATIO — SIGNIFICANT CHANGE UP (ref 0.65–1.3)
LYMPHOCYTES # BLD AUTO: 2.09 K/UL — SIGNIFICANT CHANGE UP (ref 1–3.3)
LYMPHOCYTES # BLD AUTO: 62 % — HIGH (ref 13–44)
MCHC RBC-ENTMCNC: 28.5 PG — SIGNIFICANT CHANGE UP (ref 27–31)
MCHC RBC-ENTMCNC: 30.4 G/DL — LOW (ref 32–37)
MCV RBC AUTO: 93.6 FL — SIGNIFICANT CHANGE UP (ref 80–94)
MONOCYTES # BLD AUTO: 0.34 K/UL — SIGNIFICANT CHANGE UP (ref 0.1–0.6)
MONOCYTES NFR BLD AUTO: 10 % — HIGH (ref 1.7–9.3)
NEUTROPHILS # BLD AUTO: 0.51 K/UL — LOW (ref 1.4–6.5)
NEUTROPHILS NFR BLD AUTO: 15 % — LOW (ref 42.2–75.2)
NRBC # BLD: SIGNIFICANT CHANGE UP /100 WBCS (ref 0–0)
PLATELET # BLD AUTO: 41 K/UL — LOW (ref 130–400)
PMV BLD: 10.9 FL — HIGH (ref 7.4–10.4)
POTASSIUM SERPL-MCNC: 3.8 MMOL/L — SIGNIFICANT CHANGE UP (ref 3.5–5)
POTASSIUM SERPL-SCNC: 3.8 MMOL/L — SIGNIFICANT CHANGE UP (ref 3.5–5)
PROT SERPL-MCNC: 7.7 G/DL — SIGNIFICANT CHANGE UP (ref 6–8)
PROTHROM AB SERPL-ACNC: 12.4 SEC — SIGNIFICANT CHANGE UP (ref 9.95–12.87)
RBC # BLD: 3.62 M/UL — LOW (ref 4.7–6.1)
RBC # FLD: 18.5 % — HIGH (ref 11.5–14.5)
SODIUM SERPL-SCNC: 139 MMOL/L — SIGNIFICANT CHANGE UP (ref 135–146)
WBC # BLD: 3.37 K/UL — LOW (ref 4.8–10.8)
WBC # FLD AUTO: 3.37 K/UL — LOW (ref 4.8–10.8)

## 2024-11-01 PROCEDURE — 87641 MR-STAPH DNA AMP PROBE: CPT

## 2024-11-01 PROCEDURE — 93010 ELECTROCARDIOGRAM REPORT: CPT

## 2024-11-01 PROCEDURE — 86901 BLOOD TYPING SEROLOGIC RH(D): CPT

## 2024-11-01 PROCEDURE — 86850 RBC ANTIBODY SCREEN: CPT

## 2024-11-01 PROCEDURE — 85730 THROMBOPLASTIN TIME PARTIAL: CPT

## 2024-11-01 PROCEDURE — 93005 ELECTROCARDIOGRAM TRACING: CPT

## 2024-11-01 PROCEDURE — 85025 COMPLETE CBC W/AUTO DIFF WBC: CPT

## 2024-11-01 PROCEDURE — 80053 COMPREHEN METABOLIC PANEL: CPT

## 2024-11-01 PROCEDURE — 87640 STAPH A DNA AMP PROBE: CPT

## 2024-11-01 PROCEDURE — 85610 PROTHROMBIN TIME: CPT

## 2024-11-01 PROCEDURE — 99214 OFFICE O/P EST MOD 30 MIN: CPT | Mod: 25

## 2024-11-01 PROCEDURE — 36415 COLL VENOUS BLD VENIPUNCTURE: CPT

## 2024-11-01 PROCEDURE — 86900 BLOOD TYPING SEROLOGIC ABO: CPT

## 2024-11-01 NOTE — H&P PST ADULT - ATTENDING COMMENTS
Plt count 54. Had discussed with Hem onc prior to the procedure. Will proceed. Risks/benefits discussed with patient. Agreeable.

## 2024-11-01 NOTE — H&P PST ADULT - NSICDXPASTMEDICALHX_GEN_ALL_CORE_FT
PAST MEDICAL HISTORY:  H/O cardiomyopathy     History of COPD     NSVT (nonsustained ventricular tachycardia)

## 2024-11-01 NOTE — H&P PST ADULT - REASON FOR ADMISSION
Patient is a   79 year old  male presenting to [facility] in preparation for   ICD DC IMPLANT on   11/15/24 under LSB anesthesia by Dr. Conley .  The patient  has hx of NICM with EF 25-30% and NSVT.  He initially had a wearable defibrillator after dc from the hospital when he was admitted in March 2024 with symptoms of exertional dyspnea. He has a hx of thrombocytopenia and COPD.   His breathing feels significantly improved.  He denies any syncope or pre syncope; no CP or palpitations.    He does have a loose left upper molar; he will see his dentist in the upcoming week per instruction today.    Discussed plan for ICD implant and aware of NPO status; reviewed medications w/ pt pre op; he will hold lasix da;y of procedure and hold losartan 24 hours prior.  No NSAIDs or OTC vitamins/ supplements x7 days. Patient is a   79 year old  male presenting to [facility] in preparation for   ICD DC IMPLANT on   11/15/24 under LSB anesthesia by Dr. Conley .  The patient  has hx of NICM with EF 25-30% and NSVT.  He initially had a wearable defibrillator after dc from the hospital when he was admitted in March 2024 with symptoms of exertional dyspnea. He has a hx of thrombocytopenia and COPD.   His breathing feels significantly improved.  He denies any syncope or pre syncope; no CP or palpitations.    He does have a loose left upper molar; he will see his dentist in the upcoming week per instruction today.    Discussed plan for ICD implant and aware of NPO status; reviewed medications w/ pt pre op; he will hold lasix da;y of procedure and hold losartan 24 hours prior.  No NSAIDs or OTC vitamins/ supplements x7 days.  Of note, pt was unable to void during the appointment today and will return w/ urine sample on Monday 11/4.

## 2024-11-01 NOTE — H&P PST ADULT - HISTORY OF PRESENT ILLNESS
PATIENT/GUARDIAN CURRENTLY DENIES CHEST PAIN  SHORTNESS OF BREATH  PALPITATIONS,  DYSURIA, OR UPPER RESPIRATORY INFECTION IN PAST 2 WEEKS  denies travel outside the [place] in the past 30 days    Anesthesia Alert  NO--Difficult Airway; mallinpati II   NO--History of neck surgery or radiation  NO--Limited ROM of neck  NO--History of Malignant hyperthermia  NO--No personal or family history of Pseudocholinesterase deficiency.  NO--Prior Anesthesia Complication  NO--Latex Allergy  YES--Loose teeth; LEFT UPPER  NO--History of Rheumatoid Arthritis  NO--Bleeding risk  NO--KENNEDY  NO--Other_____    PT/GUARDIAN DENIES ANY RASHES, ABRASION, OR OPEN WOUNDS OR CUTS    AS PER THE PT/GUARDIAN, THIS IS HIS/HER COMPLETE MEDICAL AND SURGICAL HX, INCLUDING MEDICATIONS PRESCRIBED AND OVER THE COUNTER    Patient/guardian understands the instructions and was given the opportunity to ask questions and have them answered.          Mark Activity Status Index (DASI)  [facility] Activity Status Index (DASI) from Recommendi  on 11/1/2024  ** All calculations should be rechecked by clinician prior to use **    RESULT SUMMARY:  26.95 points  The higher the score (maximum 58.2), the higher the functional status.    6.05 METs        INPUTS:  Take care of self —> 2.75 = Yes  Walk indoors —> 1.75 = Yes  Walk 1&ndash;2 blocks on level ground —> 2.75 = Yes  Climb a flight of stairs or walk up a hill —> 5.5 = Yes  Run a short distance —> 8 = Yes  Do light work around the house —> 2.7 = Yes  Do moderate work around the house —> 3.5 = Yes  Do heavy work around the house —> 0 = No  Do yardwork —> 0 = No  Have sexual relations —> 0 = No  Participate in moderate recreational activities —> 0 = No  Participate in strenuous sports —> 0 = No        Revised Cardiac Risk Index for Pre-Operative Risk  Revised Cardiac Risk Index for Pre-Operative Risk from Recommendi  on 11/1/2024  ** All calculations should be rechecked by clinician prior to use **    RESULT SUMMARY:  2 points  Class III Risk    10.1 %  30-day risk of death, MI, or cardiac arrest    From Ducmargarito 2017. These numbers are higher than those from the original study (Abel 1999). See Evidence for details.      INPUTS:  Elevated-risk surgery —> 0 = No  History of ischemic heart disease —> 1 = Yes  History of congestive heart failure —> 1 = Yes  History of cerebrovascular disease —> 0 = No  Pre-operative treatment with insulin —> 0 = No  Pre-operative creatinine >2 mg/dL / 176.8 µmol/L —> 0 = No

## 2024-11-02 DIAGNOSIS — Z01.818 ENCOUNTER FOR OTHER PREPROCEDURAL EXAMINATION: ICD-10-CM

## 2024-11-02 DIAGNOSIS — I50.22 CHRONIC SYSTOLIC (CONGESTIVE) HEART FAILURE: ICD-10-CM

## 2024-11-04 ENCOUNTER — OUTPATIENT (OUTPATIENT)
Dept: OUTPATIENT SERVICES | Facility: HOSPITAL | Age: 79
LOS: 1 days | End: 2024-11-04
Payer: MEDICARE

## 2024-11-04 DIAGNOSIS — Z02.9 ENCOUNTER FOR ADMINISTRATIVE EXAMINATIONS, UNSPECIFIED: ICD-10-CM

## 2024-11-04 DIAGNOSIS — Z98.49 CATARACT EXTRACTION STATUS, UNSPECIFIED EYE: Chronic | ICD-10-CM

## 2024-11-04 PROBLEM — Z87.09 PERSONAL HISTORY OF OTHER DISEASES OF THE RESPIRATORY SYSTEM: Chronic | Status: ACTIVE | Noted: 2024-11-01

## 2024-11-04 PROBLEM — I47.29 OTHER VENTRICULAR TACHYCARDIA: Chronic | Status: ACTIVE | Noted: 2024-11-01

## 2024-11-04 PROBLEM — Z86.79 PERSONAL HISTORY OF OTHER DISEASES OF THE CIRCULATORY SYSTEM: Chronic | Status: ACTIVE | Noted: 2024-11-01

## 2024-11-04 LAB
APPEARANCE UR: CLEAR — SIGNIFICANT CHANGE UP
BACTERIA # UR AUTO: ABNORMAL /HPF
BILIRUB UR-MCNC: NEGATIVE — SIGNIFICANT CHANGE UP
CAST: 7 /LPF — HIGH (ref 0–4)
COLOR SPEC: YELLOW — SIGNIFICANT CHANGE UP
DIFF PNL FLD: NEGATIVE — SIGNIFICANT CHANGE UP
GLUCOSE UR QL: NEGATIVE MG/DL — SIGNIFICANT CHANGE UP
KETONES UR-MCNC: NEGATIVE MG/DL — SIGNIFICANT CHANGE UP
LEUKOCYTE ESTERASE UR-ACNC: ABNORMAL
MRSA PCR RESULT.: NEGATIVE — SIGNIFICANT CHANGE UP
NITRITE UR-MCNC: NEGATIVE — SIGNIFICANT CHANGE UP
PH UR: 6 — SIGNIFICANT CHANGE UP (ref 5–8)
PROT UR-MCNC: SIGNIFICANT CHANGE UP MG/DL
RBC CASTS # UR COMP ASSIST: 1 /HPF — SIGNIFICANT CHANGE UP (ref 0–4)
SP GR SPEC: 1.01 — SIGNIFICANT CHANGE UP (ref 1–1.03)
SQUAMOUS # UR AUTO: 1 /HPF — SIGNIFICANT CHANGE UP (ref 0–5)
UROBILINOGEN FLD QL: 0.2 MG/DL — SIGNIFICANT CHANGE UP (ref 0.2–1)
WBC UR QL: 28 /HPF — HIGH (ref 0–5)

## 2024-11-04 PROCEDURE — 81001 URINALYSIS AUTO W/SCOPE: CPT

## 2024-11-04 PROCEDURE — 87086 URINE CULTURE/COLONY COUNT: CPT

## 2024-11-05 DIAGNOSIS — Z02.9 ENCOUNTER FOR ADMINISTRATIVE EXAMINATIONS, UNSPECIFIED: ICD-10-CM

## 2024-11-06 LAB
CULTURE RESULTS: SIGNIFICANT CHANGE UP
SPECIMEN SOURCE: SIGNIFICANT CHANGE UP

## 2024-11-15 ENCOUNTER — TRANSCRIPTION ENCOUNTER (OUTPATIENT)
Age: 79
End: 2024-11-15

## 2024-11-15 ENCOUNTER — INPATIENT (INPATIENT)
Facility: HOSPITAL | Age: 79
LOS: 0 days | Discharge: HOME HEALTH | DRG: 316 | End: 2024-11-16
Attending: STUDENT IN AN ORGANIZED HEALTH CARE EDUCATION/TRAINING PROGRAM | Admitting: STUDENT IN AN ORGANIZED HEALTH CARE EDUCATION/TRAINING PROGRAM
Payer: MEDICARE

## 2024-11-15 ENCOUNTER — RESULT REVIEW (OUTPATIENT)
Age: 79
End: 2024-11-15

## 2024-11-15 ENCOUNTER — APPOINTMENT (OUTPATIENT)
Dept: ELECTROPHYSIOLOGY | Facility: HOSPITAL | Age: 79
End: 2024-11-15

## 2024-11-15 VITALS — WEIGHT: 128.09 LBS | HEIGHT: 68 IN

## 2024-11-15 DIAGNOSIS — I50.22 CHRONIC SYSTOLIC (CONGESTIVE) HEART FAILURE: ICD-10-CM

## 2024-11-15 DIAGNOSIS — Z98.49 CATARACT EXTRACTION STATUS, UNSPECIFIED EYE: Chronic | ICD-10-CM

## 2024-11-15 DIAGNOSIS — I42.8 OTHER CARDIOMYOPATHIES: ICD-10-CM

## 2024-11-15 LAB
BLD GP AB SCN SERPL QL: SIGNIFICANT CHANGE UP
HCT VFR BLD CALC: 25.3 % — LOW (ref 42–52)
HCT VFR BLD CALC: 25.9 % — LOW (ref 42–52)
HCT VFR BLD CALC: 35.8 % — LOW (ref 42–52)
HGB BLD-MCNC: 10.8 G/DL — LOW (ref 14–18)
HGB BLD-MCNC: 7.7 G/DL — LOW (ref 14–18)
HGB BLD-MCNC: 7.8 G/DL — LOW (ref 14–18)
MCHC RBC-ENTMCNC: 28.4 PG — SIGNIFICANT CHANGE UP (ref 27–31)
MCHC RBC-ENTMCNC: 28.5 PG — SIGNIFICANT CHANGE UP (ref 27–31)
MCHC RBC-ENTMCNC: 28.6 PG — SIGNIFICANT CHANGE UP (ref 27–31)
MCHC RBC-ENTMCNC: 30.1 G/DL — LOW (ref 32–37)
MCHC RBC-ENTMCNC: 30.2 G/DL — LOW (ref 32–37)
MCHC RBC-ENTMCNC: 30.4 G/DL — LOW (ref 32–37)
MCV RBC AUTO: 93.7 FL — SIGNIFICANT CHANGE UP (ref 80–94)
MCV RBC AUTO: 94.2 FL — HIGH (ref 80–94)
MCV RBC AUTO: 95 FL — HIGH (ref 80–94)
NRBC # BLD: 2 /100 WBCS — HIGH (ref 0–0)
PLATELET # BLD AUTO: 51 K/UL — LOW (ref 130–400)
PLATELET # BLD AUTO: 52 K/UL — LOW (ref 130–400)
PLATELET # BLD AUTO: 54 K/UL — LOW (ref 130–400)
PMV BLD: 11.3 FL — HIGH (ref 7.4–10.4)
PMV BLD: 11.6 FL — HIGH (ref 7.4–10.4)
PMV BLD: 11.7 FL — HIGH (ref 7.4–10.4)
RBC # BLD: 2.7 M/UL — LOW (ref 4.7–6.1)
RBC # BLD: 2.75 M/UL — LOW (ref 4.7–6.1)
RBC # BLD: 3.77 M/UL — LOW (ref 4.7–6.1)
RBC # FLD: 19.6 % — HIGH (ref 11.5–14.5)
RBC # FLD: 19.8 % — HIGH (ref 11.5–14.5)
RBC # FLD: 19.9 % — HIGH (ref 11.5–14.5)
WBC # BLD: 3.06 K/UL — LOW (ref 4.8–10.8)
WBC # BLD: 3.55 K/UL — LOW (ref 4.8–10.8)
WBC # BLD: 3.62 K/UL — LOW (ref 4.8–10.8)
WBC # FLD AUTO: 3.06 K/UL — LOW (ref 4.8–10.8)
WBC # FLD AUTO: 3.55 K/UL — LOW (ref 4.8–10.8)
WBC # FLD AUTO: 3.62 K/UL — LOW (ref 4.8–10.8)

## 2024-11-15 PROCEDURE — 93283 PRGRMG EVAL IMPLANTABLE DFB: CPT

## 2024-11-15 PROCEDURE — 36415 COLL VENOUS BLD VENIPUNCTURE: CPT

## 2024-11-15 PROCEDURE — 33249 INSJ/RPLCMT DEFIB W/LEAD(S): CPT

## 2024-11-15 PROCEDURE — C1721: CPT

## 2024-11-15 PROCEDURE — C1889: CPT

## 2024-11-15 PROCEDURE — 93005 ELECTROCARDIOGRAM TRACING: CPT | Mod: XU

## 2024-11-15 PROCEDURE — 71046 X-RAY EXAM CHEST 2 VIEWS: CPT

## 2024-11-15 PROCEDURE — C1894: CPT

## 2024-11-15 PROCEDURE — 86901 BLOOD TYPING SEROLOGIC RH(D): CPT

## 2024-11-15 PROCEDURE — 86850 RBC ANTIBODY SCREEN: CPT

## 2024-11-15 PROCEDURE — C1892: CPT

## 2024-11-15 PROCEDURE — C1898: CPT

## 2024-11-15 PROCEDURE — 71045 X-RAY EXAM CHEST 1 VIEW: CPT | Mod: 26

## 2024-11-15 PROCEDURE — C1777: CPT

## 2024-11-15 PROCEDURE — 71045 X-RAY EXAM CHEST 1 VIEW: CPT

## 2024-11-15 PROCEDURE — 85027 COMPLETE CBC AUTOMATED: CPT

## 2024-11-15 PROCEDURE — 86900 BLOOD TYPING SEROLOGIC ABO: CPT

## 2024-11-15 RX ORDER — METOPROLOL TARTRATE 50 MG
50 TABLET ORAL DAILY
Refills: 0 | Status: DISCONTINUED | OUTPATIENT
Start: 2024-11-15 | End: 2024-11-16

## 2024-11-15 RX ORDER — FUROSEMIDE 40 MG
1 TABLET ORAL
Refills: 0 | DISCHARGE

## 2024-11-15 RX ORDER — CEFAZOLIN SODIUM 1 G
2000 VIAL (EA) INJECTION ONCE
Refills: 0 | Status: COMPLETED | OUTPATIENT
Start: 2024-11-15 | End: 2024-11-15

## 2024-11-15 RX ORDER — ACETAMINOPHEN 500 MG
650 TABLET ORAL EVERY 6 HOURS
Refills: 0 | Status: DISCONTINUED | OUTPATIENT
Start: 2024-11-15 | End: 2024-11-16

## 2024-11-15 RX ORDER — SODIUM CHLORIDE 9 MG/ML
1000 INJECTION, SOLUTION INTRAMUSCULAR; INTRAVENOUS; SUBCUTANEOUS ONCE
Refills: 0 | Status: COMPLETED | OUTPATIENT
Start: 2024-11-15 | End: 2024-11-15

## 2024-11-15 RX ORDER — POTASSIUM BICARBONATE 782 MG/1
1 TABLET, EFFERVESCENT ORAL
Refills: 0 | DISCHARGE

## 2024-11-15 RX ORDER — FOLIC ACID 1 MG/1
1 TABLET ORAL
Refills: 0 | DISCHARGE

## 2024-11-15 RX ORDER — CEFAZOLIN SODIUM 1 G
1000 VIAL (EA) INJECTION EVERY 8 HOURS
Refills: 0 | Status: COMPLETED | OUTPATIENT
Start: 2024-11-15 | End: 2024-11-16

## 2024-11-15 RX ORDER — METOPROLOL TARTRATE 50 MG
1 TABLET ORAL
Refills: 0 | DISCHARGE

## 2024-11-15 RX ORDER — FUROSEMIDE 40 MG
20 TABLET ORAL DAILY
Refills: 0 | Status: DISCONTINUED | OUTPATIENT
Start: 2024-11-15 | End: 2024-11-16

## 2024-11-15 RX ORDER — LOSARTAN POTASSIUM 25 MG/1
25 TABLET ORAL DAILY
Refills: 0 | Status: DISCONTINUED | OUTPATIENT
Start: 2024-11-15 | End: 2024-11-16

## 2024-11-15 RX ORDER — CEFAZOLIN SODIUM 1 G
1000 VIAL (EA) INJECTION ONCE
Refills: 0 | Status: COMPLETED | OUTPATIENT
Start: 2024-11-15 | End: 2024-11-15

## 2024-11-15 RX ADMIN — Medication 100 MILLIGRAM(S): at 09:22

## 2024-11-15 RX ADMIN — Medication 650 MILLIGRAM(S): at 15:39

## 2024-11-15 RX ADMIN — Medication 100 MILLIGRAM(S): at 09:21

## 2024-11-15 RX ADMIN — SODIUM CHLORIDE 1000 MILLILITER(S): 9 INJECTION, SOLUTION INTRAMUSCULAR; INTRAVENOUS; SUBCUTANEOUS at 12:00

## 2024-11-15 RX ADMIN — Medication 650 MILLIGRAM(S): at 16:39

## 2024-11-15 RX ADMIN — Medication 100 MILLIGRAM(S): at 17:46

## 2024-11-15 NOTE — PATIENT PROFILE ADULT - FALL HARM RISK - HARM RISK INTERVENTIONS

## 2024-11-15 NOTE — CHART NOTE - NSCHARTNOTEFT_GEN_A_CORE
PACU ANESTHESIA ADMISSION NOTE      Procedure: ICD implantation  Post op diagnosis:  cardiomyopathy    ____  Intubated  TV:______       Rate: ______      FiO2: ______    __x__  Patent Airway    __x__  Full return of protective reflexes    _x___  Full recovery from anesthesia / back to baseline     Vitals:   T:  97         R:  18                BP: 91/53                 Sat: 96                  P: 80      Mental Status:  __x__ Awake   ___x__ Alert   _____ Drowsy   _____ Sedated    Nausea/Vomiting:  __x__ NO  ______Yes,   See Post - Op Orders          Pain Scale (0-10):  _0____    Treatment: ____ None    ___x_ See Post - Op/PCA Orders    Post - Operative Fluids:   ____ Oral   __x__ See Post - Op Orders    Plan: Discharge:   ____Home       ___x__Floor     _____Critical Care    _____  Other:_________________    Comments:

## 2024-11-15 NOTE — DISCHARGE NOTE PROVIDER - NSDCCPTREATMENT_GEN_ALL_CORE_FT
PRINCIPAL PROCEDURE  Procedure: Insertion, ICD  Findings and Treatment: - Do not shower for 1 week.  - Do not drive or operate heavy machinery for48 hours.  - Do not submerge in water (example: baths, swimming) for 1 month.  - Do not lift your left arm greater than shoulder height for 6 weeks.  - Do not lift anything heavier than 5-10 lbs with your left arm for 6 weeks.  - Any sudden swelling, redness, fever, discharge, or severe pain, call the Electrophysiology Office at [phone].

## 2024-11-15 NOTE — DISCHARGE NOTE PROVIDER - NSDCFUSCHEDAPPT_GEN_ALL_CORE_FT
[facility]  [facility] [address]   Scheduled Appointment: 11/15/2024    Fredis Mckeon  [facility]  [address] [facility]  Scheduled Appointment: 12/13/2024     Fredis Mckeon  [facility]  PULMMED [address] [place] [address] Mark  Scheduled Appointment: 12/13/2024

## 2024-11-15 NOTE — PATIENT PROFILE ADULT - FOOD INSECURITY
Add 89960 Cpt? (Important Note: In 2017 The Use Of 85540 Is Being Tracked By Cms To Determine Future Global Period Reimbursement For Global Periods): yes
Detail Level: Detailed
no

## 2024-11-15 NOTE — PATIENT PROFILE ADULT - NS PRO AD NO ADVANCE DIRECTIVE
Patient cleared for discharge. Patient discharged to home with mother. Patient educated on discharge instructions, diet recommendations, and medication instructions. Patient understoodruc discharge instructions, diet recommendations, and medication instructions. Patient also informed on follow up appointments.   No

## 2024-11-15 NOTE — DISCHARGE NOTE PROVIDER - NSDCMRMEDTOKEN_GEN_ALL_CORE_FT
folic acid: 1 milligram(s) once a day  Lasix 20 mg oral tablet: 1 tab(s) orally once a day  losartan 25 mg oral tablet: 1 tab(s) orally once a day  metoprolol succinate 50 mg oral capsule, extended release: 1 cap(s) orally once a day  potassium bicarbonate 10 mEq oral tablet, effervescent: 1 tab(s) orally  potassium bicarbonate 20 mEq oral tablet, effervescent: 1 tab(s) orally  Potassium Chloride (Eqv-Klor-Con M20) 20 mEq oral tablet, extended release: 1 tab(s) orally once a day   cephalexin 250 mg oral tablet: 1 tab(s) orally every 12 hours  folic acid: 1 milligram(s) once a day  Lasix 20 mg oral tablet: 1 tab(s) orally once a day  losartan 25 mg oral tablet: 1 tab(s) orally once a day  metoprolol succinate 50 mg oral capsule, extended release: 1 cap(s) orally once a day  potassium bicarbonate 10 mEq oral tablet, effervescent: 1 tab(s) orally   cephalexin 250 mg oral tablet: 1 tab(s) orally every 12 hours  folic acid: 1 milligram(s) once a day  Lasix 20 mg oral tablet: 1 tab(s) orally once a day  losartan 25 mg oral tablet: 1 tab(s) orally once a day  metoprolol succinate 50 mg oral capsule, extended release: 1 cap(s) orally once a day  oxycodone-acetaminophen 5 mg-325 mg oral tablet: 1 tab(s) orally every 8 hours as needed for  severe pain MDD: 3  potassium bicarbonate 10 mEq oral tablet, effervescent: 1 tab(s) orally

## 2024-11-15 NOTE — DISCHARGE NOTE NURSING/CASE MANAGEMENT/SOCIAL WORK - NSDCPETBCESMAN_GEN_ALL_CORE
MRN:[id number]                      After Visit Summary   7/31/2017    Antoni Shah    MRN: [id number]           Thank you!     Thank you for choosing [facility] for your care. Our goal is always to provide you with excellent care. Hearing back from our patients is one way we can continue to improve our services. Please take a few minutes to complete the written survey that you may receive in the mail after you visit with us. Thank you!        Patient Information     Date Of Birth          1977        Designated Caregiver       Most Recent Value    Caregiver    Will someone help with your care after discharge? yes    Name of designated caregiver wife    Phone number of caregiver same as pt    Caregiver address same as pt      About your hospital stay     You were admitted on:  July 31, 2017 You last received care in the:  [address] [facility]    You were discharged on:  July 31, 2017       Who to Call     For medical emergencies, please call 911.  For non-urgent questions about your medical care, please call your primary care provider or clinic, [phone]          Attending Provider     Provider Specialty    Vivienne Wyatt APRN CNP Nurse Practitioner - Family    Quinton Nur MD Family Practice       Primary Care Provider Office Phone # Fax #    Quinton Nur [phone]      After Care Instructions     Activity       Your activity upon discharge: activity as tolerated            Diet       Follow this diet upon discharge: Regular                  Follow-up Appointments     Follow-up and recommended labs and tests        Follow up with primary care provider, Quinton Nur MD, within 7 days for hospital follow- up.  The following labs/tests are recommended: consider lytes. Blood pressure check.                  Your next 10 appointments already scheduled     Aug 08, 2017 10:20 AM CDT   Office Visit with Quinton Nur MD   [facility]  If you are a smoker, it is important for your health to stop smoking. Please be aware that second hand smoke is also harmful. "[facility] ([facility])    [address] [facility] [id number] [phone]           Bring a current list of meds and any records pertaining to this visit. For Physicals, please bring immunization records and any forms needing to be filled out. Please arrive 10 minutes early to complete paperwork.              Pending Results     No orders found from 7/29/2017 to 8/1/2017.            Statement of Approval     Ordered          07/31/17 9579  I have reviewed and agree with all the recommendations and orders detailed in this document.  EFFECTIVE NOW     Approved and electronically signed by:  Gareth Hood MD             Admission Information     Date & Time Provider Department Dept. Phone    7/31/2017 Quinton Nur MD [address] [phone]      Your Vitals Were     Blood Pressure Pulse Temperature Respirations Height Weight    124/69 83 97.6  F (36.4  C) (Oral) 16 1.778 m (5' 10\") 96.2 kg (212 lb)    Pulse Oximetry BMI (Body Mass Index)                99% 30.42 kg/m2          MyChart Information     [employer] gives you secure access to your electronic health record. If you see a primary care provider, you can also send messages to your care team and make appointments. If you have questions, please call your primary care clinic.  If you do not have a primary care provider, please call [phone] and they will assist you.        Care EveryWhere ID     This is your Care EveryWhere ID. This could be used by other organizations to access your [facility] medical records  [id number]        Equal Access to Services     [facility] [place] : Hadii [place] hadasho Soparminderali, waaxda luqadaha, qaybta kaalmada adeegyada, sherita jay . So [facility] [phone].    ATENCIÓN: Si habla español, tiene a diego disposición servicios gratuitos de asistencia lingüística. Llame al [phone].    We comply with applicable federal civil rights laws and " [place] laws. We do not discriminate on the basis of race, color, national origin, age, disability sex, sexual orientation or gender identity.               Review of your medicines      START taking        Dose / Directions    lisinopril 40 MG tablet   Commonly known as:  PRINIVIL/ZESTRIL   Used for:  Hypertension goal BP (blood pressure) < 140/90        Dose:  40 mg   Start taking on:  8/1/2017   Take 1 tablet (40 mg) by mouth daily   Quantity:  30 tablet   Refills:  0         CONTINUE these medicines which have NOT CHANGED        Dose / Directions    amLODIPine 10 MG tablet   Commonly known as:  NORVASC   Used for:  Hypertension goal BP (blood pressure) < 140/90        TAKE ONE TABLET BY MOUTH ONCE DAILY   Quantity:  90 tablet   Refills:  1       aspirin 81 MG tablet        Dose:  81 mg   Take 1 tablet (81 mg) by mouth daily   Refills:  0       atenolol 100 MG tablet   Commonly known as:  TENORMIN   Used for:  Hypertension goal BP (blood pressure) < 140/90        Dose:  100 mg   Take 1 tablet (100 mg) by mouth daily   Quantity:  90 tablet   Refills:  2       LORazepam 0.5 MG tablet   Commonly known as:  ATIVAN   Used for:  Anxiety attack        Dose:  0.5-1 mg   Take 1-2 tablets (0.5-1 mg) by mouth every 6 hours as needed for anxiety   Quantity:  20 tablet   Refills:  0       potassium chloride 10 MEQ tablet   Commonly known as:  K-TAB,KLOR-CON   Used for:  Hypertension goal BP (blood pressure) < 140/90        TAKE ONE TABLET BY MOUTH ONCE DAILY   Quantity:  90 tablet   Refills:  1         STOP taking     lisinopril-hydrochlorothiazide 20-12.5 MG per tablet   Commonly known as:  PRINZIDE/ZESTORETIC                Where to get your medicines      These medications were sent to [facility] [place] - [address]  [address] [facility] [address]     Phone:  [phone]     lisinopril 40 MG tablet                Protect others around you: Learn how to safely use, store and throw away your medicines  at www.disposemymeds.org.             Medication List: This is a list of all your medications and when to take them. Check marks below indicate your daily home schedule. Keep this list as a reference.      Medications           Morning Afternoon Evening Bedtime As Needed    amLODIPine 10 MG tablet   Commonly known as:  NORVASC   TAKE ONE TABLET BY MOUTH ONCE DAILY                                aspirin 81 MG tablet   Take 1 tablet (81 mg) by mouth daily                                atenolol 100 MG tablet   Commonly known as:  TENORMIN   Take 1 tablet (100 mg) by mouth daily                                lisinopril 40 MG tablet   Commonly known as:  PRINIVIL/ZESTRIL   Take 1 tablet (40 mg) by mouth daily   Start taking on:  8/1/2017                                LORazepam 0.5 MG tablet   Commonly known as:  ATIVAN   Take 1-2 tablets (0.5-1 mg) by mouth every 6 hours as needed for anxiety                                potassium chloride 10 MEQ tablet   Commonly known as:  K-TAB,KLOR-CON   TAKE ONE TABLET BY MOUTH ONCE DAILY

## 2024-11-15 NOTE — DISCHARGE NOTE NURSING/CASE MANAGEMENT/SOCIAL WORK - PATIENT PORTAL LINK FT
You can access the FollowMyHealth Patient Portal offered by [facility] by registering at the following website: [email or web address]. By joining [employer]’s FollowMyHealth portal, you will also be able to view your health information using other applications (apps) compatible with our system.

## 2024-11-15 NOTE — DISCHARGE NOTE PROVIDER - NSDCCPCAREPLAN_GEN_ALL_CORE_FT
PRINCIPAL DISCHARGE DIAGNOSIS  Diagnosis: NICM (nonischemic cardiomyopathy)  Assessment and Plan of Treatment:

## 2024-11-15 NOTE — DISCHARGE NOTE PROVIDER - HOSPITAL COURSE
Mr. Steve Yoo is a 80 yo M with a PMHx of NICM (TTE EF 40% G1DD- 6/15/2024), thrombocytopenia, smoking, s/p wearable defibrillator, patient was referred for cMRI which showed EF 25-30% with thinned scar and NSVT. Plan for high risk EP study and TV- ICD with Dr. Conley for primary prevention of SCD. On 11/15/24 patient underwent successful (Insert device type including leads and device company ie. Medtronic Dual Chamber ICD) implantation. Patient was monitored overnight. On POD 1 patient remains HD stable with no complaints. Examination of ICD pocket is C/D/I with no hematoma or erythema. Device interrogation reveals normal device function and CXR was negative for pneumothorax. Mr. Steve Yoo is a 78 yo M with a PMHx of NICM (TTE EF 40% G1DD- 6/15/2024), thrombocytopenia, smoking, s/p wearable defibrillator, patient was referred for cMRI which showed EF 25-30% with thinned scar and NSVT. Plan for high risk EP study and TV- ICD with Dr. Conley for primary prevention of SCD. On 11/15/24 patient underwent successful Dual chamber ICD implant implantation. Patient was monitored overnight. On POD 1 patient remains HD stable with no complaints. Examination of ICD pocket is C/D/I with no hematoma or erythema. Device interrogation reveals normal device function and CXR was negative for pneumothorax. Mr. Steve Yoo is a 80 yo M with a PMHx of NICM (TTE EF 40% G1DD- 6/15/2024), thrombocytopenia, smoking, s/p wearable defibrillator, patient was referred for cMRI which showed EF 25-30% with thinned scar and NSVT. Plan for high risk EP study and TV- ICD with Dr. Conley for primary prevention of SCD. On 11/15/24 patient underwent successful Dual chamber ICD implant implantation. Patient was monitored overnight. On POD 1 patient remains HD stable with no complaints. Examination of ICD pocket is C/D/I with no hematoma or erythema or discoloration. Device interrogation reveals normal device function and CXR was negative for pneumothorax. patient has hx of thrombocytopenia  with baseline plt around 70. patient had drop in plt to 50s and has some post procedure blood loss. will need to repeat cbc with hematology or with EP Dr Conley outpatient during follow up. patient has all prescription meds refilled at home and does not require any prescriptions during this discharge.

## 2024-11-15 NOTE — DISCHARGE NOTE NURSING/CASE MANAGEMENT/SOCIAL WORK - FINANCIAL ASSISTANCE
[facility] provides services at a reduced cost to those who are determined to be eligible through [facility]’s financial assistance program. Information regarding [facility]’s financial assistance program can be found by going to [email or web address] or by calling [phone].

## 2024-11-15 NOTE — DISCHARGE NOTE PROVIDER - CARE PROVIDER_API CALL
Rula Conley  Cardiac Electrophysiology  [address] [place] [id number]  Phone: [phone]  Fax: [phone]  Follow Up Time: 1 month

## 2024-11-15 NOTE — PRE-ANESTHESIA EVALUATION ADULT - NSANTHADDINFOFT_GEN_ALL_CORE
Patient thrombocytopenic and being seen by hematologist. Platelet count of 54, per heme and proceduralist, proceed with procedure. Appropriate type and cross completed with platelets available if needed. Patient understands risk of increased bleeding due to thrombocytopenia.

## 2024-11-15 NOTE — PRE-ANESTHESIA EVALUATION ADULT - SPO2 (%)
Dr. Lowe was updated about pt, ok to proceed to the floor. VSS, no c/o nausea, taking sips of PO fluids,  Pain has decreased after IV analgetics- tolerable at this time per pt.  
95

## 2024-11-15 NOTE — DISCHARGE NOTE PROVIDER - ATTENDING DISCHARGE PHYSICAL EXAMINATION:
Patient seen and examined. Pertinent labs, imaging and telemetry reviewed. I agree with the above:     Patient feeling well  RRR. S1S2 present.   CTA B/L.   Left upper chest wall site CDI without hematoma.

## 2024-11-15 NOTE — PATIENT PROFILE ADULT - COMPLETE THE FOLLOWING IF THE PATIENT REFUSES THE INFLUENZA VACCINE:
Sanam Stoddard is 4 week old, here for a preventive care visit.    Assessment & Plan   1.  , gestational age 34 completed weeks  Discharged from NICU 2 weeks ago. Doing well. Following growth curves. Taking bottles of pumped breast milk + formula fortified to 22 Kcal w/ Neosure. Has been taking 60 mL's q 3 hours. Discussed increasing volume as tolerated. Continue to fortify bottles to 22 Kcal. Will continue to 40-44 weeks CGA  Given prematurity, recommended nurse only appointment for weight check in 2 weeks, sooner with concerns.  Recommended parents give Iman and twin sister probiotic given that they were born early and to help with gas/GI disturbances.   Continue poly-vi-sol with iron daily.     2. Diaper rash  Worsening diaper rash that is now bleeding at times. Appears to have both bacterial and yeast component. Diaper rash has been present and worsening x 1 week.   - mupirocin (BACTROBAN) 2 % external ointment; Apply topically 2 times daily  Dispense: 30 g; Refill: 0  - clotrimazole (LOTRIMIN) 1 % external cream; Apply topically 2 times daily  Dispense: 30 g; Refill: 0    PLAN:   - Avoid diaper wipes until it is healed  - Instead, clean bottom with warm water and wash cloth. Dab bottom instead of wiping to prevent further breakdown   - Apply Bactroban and Lotrimin cream to diaper rash 2 times per day for the next 5-7 days or until it is healed (less if it resolves before then)  - After you apply the creams, you want to cover the whole diaper area with Desitin or Aquaphor to act as barrier   - You can also soak her bottom in warm water baths a few times per day and add 1 tsp of baking soda to help heal rash  - Try and increase air exposure to diaper area by adhering the diaper on looser   - If diaper rash is not improving in the next few days or is worsening, let us know !       3. WCC (well child check),  8-28 days old  Overall doing well. Has had some gas. Parents are going to dry  simethicone drops. Discussed continuing to bicycle legs, burp frequently, soak bottom in warm water baths, rectal stim, etc for gas/GI disturbance.      Mom plans to do combo breast milk + formula, as exclusively pumping is too overwhelming.  Discussed increasing milk supply with mother in clinic today.       Growth      Weight change since birth: 24%    Normal OFC, length and weight    Feeding q 2-3 hours, 2 ounces (60 mL's) 22 kcal w/ Neosure. Mom has been introducing more formula lately. Mom is pumping every few hours when she is able to, but it has been busy. Parents feel like they sometimes want more than 60 mL's so last night they gave 75-80 mL's but then she seemed somewhat     BM's 8+ per day, yellow in color. 8+ wet diapers.     Immunizations     Vaccines up to date.      Anticipatory Guidance    Reviewed age appropriate anticipatory guidance.   The following topics were discussed:  SOCIAL/ FAMILY    sibling rivalry    crying/ fussiness    calming techniques    talk or sing to baby/ music  NUTRITION:    pumping/ introducing bottle    vit D if breastfeeding  HEALTH/ SAFETY:    skin care    spitting up    temperature taking    sleep patterns    car seat    safe crib        Referrals/Ongoing Specialty Care  No    Follow Up      Return in about 4 weeks (around 2022) for 1 Month Well Child Check, and in 2 weeks for nurse only weight check.    Subjective     Additional Questions 1/15/2022   Do you have any questions today that you would like to discuss? No   Has your child had a surgery, major illness or injury since the last physical exam? No       Birth History    Birth History     Birth     Weight: 5 lb 0.1 oz (2.27 kg)     Apgar     One: 8     Five: 9     Delivery Method: , Low Transverse     Gestation Age: 34 5/7 wks     Immunization History   Administered Date(s) Administered     Hep B, Peds or Adolescent 2021     Hepatitis B # 1 given in nursery: yes  [place] metabolic screening:  Results Not Known at this time  [place] hearing screen: Passed--data reviewed     [place] Hearing Screen:   Hearing Screen, Right Ear: passed        Hearing Screen, Left Ear: passed             CCHD Screen:   Right upper extremity -  Right Hand (%): 99 %     Lower extremity -  Foot (%): 99 %     CCHD Interpretation - Critical Congenital Heart Screen Result: pass       Social 1/15/2022   Who does your child live with? Parent(s), Sibling(s)   Who takes care of your child? Parent(s)   Has your child experienced any stressful family events recently? (!) BIRTH OF BABY   In the past 12 months, has lack of transportation kept you from medical appointments or from getting medications? No   In the last 12 months, was there a time when you were not able to pay the mortgage or rent on time? No   In the last 12 months, was there a time when you did not have a steady place to sleep or slept in a shelter (including now)? No       [place]  Depression Scale (EPDS) Risk Assessment: Completed [place]     Scored high on EPDS, but mom states that she is overall doing better everyday. She states that most of her feelings are related to feeling overwhelmed, but she denies any anxiety/depressive thoughts. She reports bonding well. No thoughts of harming herself or others.     Health Risks/Safety 1/15/2022   What type of car seat does your child use?  Infant car seat   Is your child's car seat forward or rear facing? Rear facing   Where does your child sit in the car?  Back seat       TB Screening 1/15/2022   Was your child born outside of the [place]? No     TB Screening 1/15/2022   Since your last Well Child visit, have any of your child's family members or close contacts had tuberculosis or a positive tuberculosis test? No       Diet 1/15/2022   Do you have questions about feeding your baby? (!) YES   Please specify:  still getting a routine down for feeding   What does your baby eat?  Breast milk, Formula   Which  "type of formula? neosure   How often does your baby eat? (From the start of one feed to start of the next feed) every 3 hours   Do you give your child vitamins or supplements? Multi-vitamin with Iron   Within the past 12 months, you worried that your food would run out before you got money to buy more. Never true   Within the past 12 months, the food you bought just didn't last and you didn't have money to get more. Never true       Sleep 1/15/2022   Where does your baby sleep? [place], (!) [employer]   In what position does your baby sleep? Back   How many times does your child wake in the night?  every 3 hours     Vision/Hearing 1/15/2022   Do you have any concerns about your child's hearing or vision?  No concerns     Has diaper rash and has been applying Desitin to bottom with every diaper change. Rash is improving, but now she has more \"blisters\" on buttocks. Sometimes parents notice some blood when wiping diaper area.     Development/ Social-Emotional Screen 1/15/2022   Does your child receive any special services? No     Development  Screening too used, reviewed with parent or guardian: No screening tool used  Milestones (by observation/ exam/ report) 75-90% ile  PERSONAL/ SOCIAL/COGNITIVE:    Regards face    Calms when picked up or spoken to  LANGUAGE:    Vocalizes    Responds to sound  GROSS MOTOR:    Holds chin up when prone    Kicks / equal movements  FINE MOTOR/ ADAPTIVE:    Eyes follow caregiver    Opens fingers slightly when at rest         Objective     Exam  Temp 98.1  F (36.7  C) (Axillary)   Ht 1' 6.9\" (0.48 m)   Wt 6 lb 3 oz (2.807 kg)   HC 13.19\" (33.5 cm)   BMI 12.18 kg/m    <1 %ile (Z= -2.62) based on WHO (Girls, 0-2 years) head circumference-for-age based on Head Circumference recorded on 1/28/2022.  <1 %ile (Z= -2.85) based on WHO (Girls, 0-2 years) weight-for-age data using vitals from 1/28/2022.  <1 %ile (Z= -2.94) based on WHO (Girls, 0-2 years) Length-for-age data based on " Length recorded on 1/28/2022.  26 %ile (Z= -0.65) based on WHO (Girls, 0-2 years) weight-for-recumbent length data based on body measurements available as of 1/28/2022.  Physical Exam  GEN: no distress  HEAD:  Normocephalic, atraumatic  EYES: no discharge or injection, equal pupils reactive to light. Red reflex present bilaterally.   EARS: normal shape, no pits/tags  NOSE: no edema, no discharge  MOUTH: MMM  NECK: supple, no asymmetry, full ROM  RESP: no increased work of breathing, clear to auscultation bilat, good air entry bilat  CVS: Regular rate and rhythm, no murmur or extra heart sounds  ABD: soft, nontender, no mass, no hepatosplenomegaly  MSK: no deformities, FROM all extremities  SKIN: Multiple erythematous satellite papules on buttocks. Superficial erosions with moderate erythema on buttocks. No other rashes.    NEURO: Nonfocal        Marilyn KEITH Feldman, CPNP-AC/PC, IBCLC    [facility]   Risks/benefits discussed with patient/surrogate

## 2024-11-16 VITALS
RESPIRATION RATE: 20 BRPM | HEART RATE: 82 BPM | TEMPERATURE: 98 F | OXYGEN SATURATION: 97 % | SYSTOLIC BLOOD PRESSURE: 113 MMHG | DIASTOLIC BLOOD PRESSURE: 65 MMHG

## 2024-11-16 LAB
HCT VFR BLD CALC: 26.9 % — LOW (ref 42–52)
HGB BLD-MCNC: 8.1 G/DL — LOW (ref 14–18)
MCHC RBC-ENTMCNC: 28.5 PG — SIGNIFICANT CHANGE UP (ref 27–31)
MCHC RBC-ENTMCNC: 30.1 G/DL — LOW (ref 32–37)
MCV RBC AUTO: 94.7 FL — HIGH (ref 80–94)
NRBC # BLD: 2 /100 WBCS — HIGH (ref 0–0)
PLATELET # BLD AUTO: 41 K/UL — LOW (ref 130–400)
PMV BLD: 11.6 FL — HIGH (ref 7.4–10.4)
RBC # BLD: 2.84 M/UL — LOW (ref 4.7–6.1)
RBC # FLD: 20 % — HIGH (ref 11.5–14.5)
WBC # BLD: 3.04 K/UL — LOW (ref 4.8–10.8)
WBC # FLD AUTO: 3.04 K/UL — LOW (ref 4.8–10.8)

## 2024-11-16 PROCEDURE — 99239 HOSP IP/OBS DSCHRG MGMT >30: CPT

## 2024-11-16 PROCEDURE — 71046 X-RAY EXAM CHEST 2 VIEWS: CPT | Mod: 26

## 2024-11-16 PROCEDURE — 93010 ELECTROCARDIOGRAM REPORT: CPT

## 2024-11-16 PROCEDURE — 93283 PRGRMG EVAL IMPLANTABLE DFB: CPT | Mod: 26

## 2024-11-16 RX ORDER — CEPHALEXIN 125 MG/5ML
1 SUSPENSION, RECONSTITUTED, ORAL (ML) ORAL
Qty: 10 | Refills: 0
Start: 2024-11-16 | End: 2024-11-20

## 2024-11-16 RX ORDER — POTASSIUM BICARBONATE 782 MG/1
1 TABLET, EFFERVESCENT ORAL
Refills: 0 | DISCHARGE

## 2024-11-16 RX ORDER — OXYCODONE AND ACETAMINOPHEN 7.5; 325 MG/1; MG/1
1 TABLET ORAL
Qty: 6 | Refills: 0
Start: 2024-11-16

## 2024-11-16 RX ORDER — POTASSIUM CHLORIDE 10 MEQ
1 TABLET, EXTENDED RELEASE ORAL
Refills: 0 | DISCHARGE

## 2024-11-16 RX ADMIN — LOSARTAN POTASSIUM 25 MILLIGRAM(S): 25 TABLET ORAL at 06:15

## 2024-11-16 RX ADMIN — Medication 100 MILLIGRAM(S): at 06:00

## 2024-11-16 RX ADMIN — Medication 100 MILLIGRAM(S): at 11:13

## 2024-11-16 RX ADMIN — Medication 50 MILLIGRAM(S): at 06:15

## 2024-11-16 RX ADMIN — Medication 20 MILLIGRAM(S): at 06:15

## 2024-11-16 NOTE — PROGRESS NOTE ADULT - NS ATTEND AMEND GEN_ALL_CORE FT
s/p ICD implant    No complication  Feeling great  No hematoma  Hgb stable  DC home  Detailed education to pt, dawitece.

## 2024-11-16 NOTE — PROGRESS NOTE ADULT - SUBJECTIVE AND OBJECTIVE BOX
INTERVAL HPI/OVERNIGHT EVENTS:  No acute events overnight  Patient S/P DC AICD POD#1  HD Stable      MEDICATIONS  (STANDING):  furosemide    Tablet 20 milliGRAM(s) Oral daily  losartan 25 milliGRAM(s) Oral daily  metoprolol succinate ER 50 milliGRAM(s) Oral daily    MEDICATIONS  (PRN):  acetaminophen     Tablet .. 650 milliGRAM(s) Oral every 6 hours PRN Mild Pain (1 - 3), Moderate Pain (4 - 6)  oxycodone    5 mG/acetaminophen 325 mG 1 Tablet(s) Oral every 4 hours PRN Moderate Pain (4 - 6)      Allergies    No Known Allergies    Intolerances        REVIEW OF SYSTEMS: No CP, palpitations, dizziness or SOB     Vital Signs Last 24 Hrs  T(C): 36.7 (16 Nov 2024 08:00), Max: 36.8 (16 Nov 2024 00:00)  T(F): 98 (16 Nov 2024 08:00), Max: 98.2 (16 Nov 2024 00:00)  HR: 82 (16 Nov 2024 08:00) (71 - 92)  BP: 113/65 (16 Nov 2024 08:00) (101/58 - 122/72)  BP(mean): 84 (16 Nov 2024 08:00) (78 - 92)  RR: 20 (16 Nov 2024 08:00) (16 - 20)  SpO2: 97% (16 Nov 2024 08:00) (93% - 100%)    Parameters below as of 16 Nov 2024 08:00  Patient On (Oxygen Delivery Method): room air        11-15-24 @ 07:01  -  11-16-24 @ 07:00  --------------------------------------------------------  IN: 120 mL / OUT: 600 mL / NET: -480 mL    11-16-24 @ 07:01  -  11-16-24 @ 12:22  --------------------------------------------------------  IN: 0 mL / OUT: 300 mL / NET: -300 mL      Physical Exam  GENERAL: In no apparent distress, well nourished, and hydrated.  EYES: EOMI, PERRLA, conjunctiva and sclera clear  NECK: Supple  HEART: Regular rate and rhythm; No murmurs, rubs, or gallops.  PULMONARY: Clear to auscultation and perfusion.  No rales, wheezing, or rhonchi bilaterally.  EXTREMITIES:  2+ Peripheral Pulses, No clubbing, cyanosis, or edema  SKIN: Dressing over L chest wall, no hematoma  NEUROLOGICAL: Grossly nonfocal      LABS:                        8.1    3.04  )-----------( 41       ( 16 Nov 2024 09:39 )             26.9                 RADIOLOGY & ADDITIONAL TESTS:

## 2024-11-21 ENCOUNTER — NON-APPOINTMENT (OUTPATIENT)
Age: 79
End: 2024-11-21

## 2024-11-21 ENCOUNTER — APPOINTMENT (OUTPATIENT)
Dept: ELECTROPHYSIOLOGY | Facility: CLINIC | Age: 79
End: 2024-11-21

## 2024-11-21 VITALS
DIASTOLIC BLOOD PRESSURE: 70 MMHG | BODY MASS INDEX: 19.7 KG/M2 | HEART RATE: 72 BPM | TEMPERATURE: 97.1 F | SYSTOLIC BLOOD PRESSURE: 124 MMHG | WEIGHT: 130 LBS | HEIGHT: 68 IN

## 2024-11-21 DIAGNOSIS — I42.8 OTHER CARDIOMYOPATHIES: ICD-10-CM

## 2024-11-21 DIAGNOSIS — Z78.9 OTHER SPECIFIED HEALTH STATUS: ICD-10-CM

## 2024-11-21 DIAGNOSIS — I47.10 SUPRAVENTRICULAR TACHYCARDIA, UNSPECIFIED: ICD-10-CM

## 2024-11-21 DIAGNOSIS — I50.20 UNSPECIFIED SYSTOLIC (CONGESTIVE) HEART FAILURE: ICD-10-CM

## 2024-11-21 DIAGNOSIS — Z82.49 FAMILY HISTORY OF ISCHEMIC HEART DISEASE AND OTHER DISEASES OF THE CIRCULATORY SYSTEM: ICD-10-CM

## 2024-11-21 DIAGNOSIS — Z80.6 FAMILY HISTORY OF LEUKEMIA: ICD-10-CM

## 2024-11-21 DIAGNOSIS — Z87.891 PERSONAL HISTORY OF NICOTINE DEPENDENCE: ICD-10-CM

## 2024-11-21 DIAGNOSIS — Z48.89 ENCOUNTER FOR OTHER SPECIFIED SURGICAL AFTERCARE: ICD-10-CM

## 2024-11-21 DIAGNOSIS — Z51.89 ENCOUNTER FOR OTHER SPECIFIED AFTERCARE: ICD-10-CM

## 2024-11-21 DIAGNOSIS — I49.3 VENTRICULAR PREMATURE DEPOLARIZATION: ICD-10-CM

## 2024-11-21 DIAGNOSIS — Z45.02 ENCOUNTER FOR ADJUSTMENT AND MANAGEMENT OF AUTOMATIC IMPLANTABLE CARDIAC DEFIBRILLATOR: ICD-10-CM

## 2024-11-21 DIAGNOSIS — D69.6 THROMBOCYTOPENIA, UNSPECIFIED: ICD-10-CM

## 2024-11-21 DIAGNOSIS — J44.9 CHRONIC OBSTRUCTIVE PULMONARY DISEASE, UNSPECIFIED: ICD-10-CM

## 2024-11-21 PROCEDURE — 99024 POSTOP FOLLOW-UP VISIT: CPT

## 2024-11-21 PROCEDURE — 93283 PRGRMG EVAL IMPLANTABLE DFB: CPT

## 2024-11-21 RX ORDER — TRAMADOL HYDROCHLORIDE 50 MG/1
50 TABLET, COATED ORAL
Qty: 7 | Refills: 0 | Status: ACTIVE | COMMUNITY
Start: 2024-11-21 | End: 1900-01-01

## 2024-11-21 RX ORDER — POTASSIUM CHLORIDE 1500 MG/1
20 TABLET, FILM COATED, EXTENDED RELEASE ORAL
Refills: 0 | Status: ACTIVE | COMMUNITY

## 2024-11-21 RX ORDER — METOPROLOL SUCCINATE 25 MG/1
25 TABLET, EXTENDED RELEASE ORAL DAILY
Qty: 90 | Refills: 1 | Status: ACTIVE | COMMUNITY
Start: 2024-11-21 | End: 1900-01-01

## 2024-11-27 ENCOUNTER — APPOINTMENT (OUTPATIENT)
Dept: ELECTROPHYSIOLOGY | Facility: CLINIC | Age: 79
End: 2024-11-27

## 2024-12-13 ENCOUNTER — APPOINTMENT (OUTPATIENT)
Dept: PULMONOLOGY | Facility: CLINIC | Age: 79
End: 2024-12-13
Payer: MEDICARE

## 2024-12-13 VITALS
WEIGHT: 130 LBS | SYSTOLIC BLOOD PRESSURE: 140 MMHG | DIASTOLIC BLOOD PRESSURE: 80 MMHG | BODY MASS INDEX: 19.7 KG/M2 | HEIGHT: 68 IN

## 2024-12-13 DIAGNOSIS — J44.9 CHRONIC OBSTRUCTIVE PULMONARY DISEASE, UNSPECIFIED: ICD-10-CM

## 2024-12-13 DIAGNOSIS — R06.02 SHORTNESS OF BREATH: ICD-10-CM

## 2024-12-13 PROCEDURE — G2211 COMPLEX E/M VISIT ADD ON: CPT

## 2024-12-13 PROCEDURE — 99213 OFFICE O/P EST LOW 20 MIN: CPT

## 2025-01-08 ENCOUNTER — APPOINTMENT (OUTPATIENT)
Dept: ELECTROPHYSIOLOGY | Facility: CLINIC | Age: 80
End: 2025-01-08

## 2025-01-30 ENCOUNTER — APPOINTMENT (OUTPATIENT)
Dept: ELECTROPHYSIOLOGY | Facility: CLINIC | Age: 80
End: 2025-01-30
Payer: MEDICARE

## 2025-01-30 ENCOUNTER — NON-APPOINTMENT (OUTPATIENT)
Age: 80
End: 2025-01-30

## 2025-01-30 VITALS
HEIGHT: 68 IN | BODY MASS INDEX: 19.85 KG/M2 | WEIGHT: 131 LBS | SYSTOLIC BLOOD PRESSURE: 130 MMHG | HEART RATE: 88 BPM | DIASTOLIC BLOOD PRESSURE: 70 MMHG

## 2025-01-30 DIAGNOSIS — Z45.02 ENCOUNTER FOR ADJUSTMENT AND MANAGEMENT OF AUTOMATIC IMPLANTABLE CARDIAC DEFIBRILLATOR: ICD-10-CM

## 2025-01-30 DIAGNOSIS — I47.19 OTHER SUPRAVENTRICULAR TACHYCARDIA: ICD-10-CM

## 2025-01-30 DIAGNOSIS — I49.01 VENTRICULAR FIBRILLATION: ICD-10-CM

## 2025-01-30 DIAGNOSIS — I50.20 UNSPECIFIED SYSTOLIC (CONGESTIVE) HEART FAILURE: ICD-10-CM

## 2025-01-30 PROCEDURE — 93283 PRGRMG EVAL IMPLANTABLE DFB: CPT

## 2025-01-30 PROCEDURE — 99214 OFFICE O/P EST MOD 30 MIN: CPT

## 2025-01-30 PROCEDURE — 93290 INTERROG DEV EVAL ICPMS IP: CPT

## 2025-01-30 RX ORDER — FUROSEMIDE 20 MG/1
20 TABLET ORAL DAILY
Refills: 0 | Status: ACTIVE | COMMUNITY

## 2025-02-20 ENCOUNTER — APPOINTMENT (OUTPATIENT)
Dept: ELECTROPHYSIOLOGY | Facility: CLINIC | Age: 80
End: 2025-02-20

## 2025-02-26 ENCOUNTER — NON-APPOINTMENT (OUTPATIENT)
Age: 80
End: 2025-02-26

## 2025-02-26 ENCOUNTER — APPOINTMENT (OUTPATIENT)
Dept: ELECTROPHYSIOLOGY | Facility: CLINIC | Age: 80
End: 2025-02-26
Payer: MEDICARE

## 2025-02-26 VITALS
HEIGHT: 68 IN | BODY MASS INDEX: 19.85 KG/M2 | WEIGHT: 131 LBS | DIASTOLIC BLOOD PRESSURE: 64 MMHG | SYSTOLIC BLOOD PRESSURE: 112 MMHG | HEART RATE: 75 BPM

## 2025-02-26 PROCEDURE — 93000 ELECTROCARDIOGRAM COMPLETE: CPT | Mod: 59

## 2025-02-26 PROCEDURE — 93290 INTERROG DEV EVAL ICPMS IP: CPT | Mod: 26

## 2025-02-26 PROCEDURE — 93283 PRGRMG EVAL IMPLANTABLE DFB: CPT

## 2025-02-26 PROCEDURE — 99214 OFFICE O/P EST MOD 30 MIN: CPT

## 2025-02-26 RX ORDER — CYANOCOBALAMIN 1000 UG/ML
1000 INJECTION, SOLUTION INTRAMUSCULAR; SUBCUTANEOUS
Refills: 0 | Status: ACTIVE | COMMUNITY

## 2025-05-09 NOTE — PHYSICAL EXAM
[Normal] : normal S1, S2, no murmur, no rub, no gallop I have personally seen and examined the patient. I have collaborated with and supervised the

## 2025-05-12 ENCOUNTER — RX RENEWAL (OUTPATIENT)
Age: 80
End: 2025-05-12

## 2025-05-29 ENCOUNTER — NON-APPOINTMENT (OUTPATIENT)
Age: 80
End: 2025-05-29

## 2025-05-29 ENCOUNTER — APPOINTMENT (OUTPATIENT)
Dept: CARDIOLOGY | Facility: CLINIC | Age: 80
End: 2025-05-29
Payer: MEDICARE

## 2025-05-29 PROCEDURE — 93295 DEV INTERROG REMOTE 1/2/MLT: CPT

## 2025-05-29 PROCEDURE — 93296 REM INTERROG EVL PM/IDS: CPT

## 2025-06-13 ENCOUNTER — APPOINTMENT (OUTPATIENT)
Dept: PULMONOLOGY | Facility: CLINIC | Age: 80
End: 2025-06-13
Payer: MEDICARE

## 2025-06-13 VITALS
HEIGHT: 68 IN | HEART RATE: 67 BPM | SYSTOLIC BLOOD PRESSURE: 116 MMHG | WEIGHT: 126 LBS | BODY MASS INDEX: 19.1 KG/M2 | RESPIRATION RATE: 15 BRPM | OXYGEN SATURATION: 98 % | DIASTOLIC BLOOD PRESSURE: 62 MMHG

## 2025-06-13 PROCEDURE — G2211 COMPLEX E/M VISIT ADD ON: CPT

## 2025-06-13 PROCEDURE — 99213 OFFICE O/P EST LOW 20 MIN: CPT

## 2025-07-18 ENCOUNTER — INPATIENT (INPATIENT)
Facility: HOSPITAL | Age: 80
LOS: 6 days | Discharge: HOME | DRG: 193 | End: 2025-07-25
Attending: INTERNAL MEDICINE | Admitting: INTERNAL MEDICINE
Payer: MEDICARE

## 2025-07-18 VITALS
HEART RATE: 74 BPM | RESPIRATION RATE: 18 BRPM | WEIGHT: 128.09 LBS | DIASTOLIC BLOOD PRESSURE: 80 MMHG | TEMPERATURE: 98 F | HEIGHT: 70 IN | OXYGEN SATURATION: 98 % | SYSTOLIC BLOOD PRESSURE: 122 MMHG

## 2025-07-18 DIAGNOSIS — Z98.49 CATARACT EXTRACTION STATUS, UNSPECIFIED EYE: Chronic | ICD-10-CM

## 2025-07-18 PROCEDURE — 99291 CRITICAL CARE FIRST HOUR: CPT | Mod: FS

## 2025-07-18 PROCEDURE — 93010 ELECTROCARDIOGRAM REPORT: CPT

## 2025-07-18 RX ORDER — ACETAMINOPHEN 500 MG/5ML
650 LIQUID (ML) ORAL ONCE
Refills: 0 | Status: COMPLETED | OUTPATIENT
Start: 2025-07-18 | End: 2025-07-18

## 2025-07-18 RX ORDER — SODIUM CHLORIDE 9 G/1000ML
1800 INJECTION, SOLUTION INTRAVENOUS ONCE
Refills: 0 | Status: COMPLETED | OUTPATIENT
Start: 2025-07-18 | End: 2025-07-18

## 2025-07-18 RX ORDER — CEFEPIME 2 G/20ML
2000 INJECTION, POWDER, FOR SOLUTION INTRAVENOUS ONCE
Refills: 0 | Status: COMPLETED | OUTPATIENT
Start: 2025-07-18 | End: 2025-07-18

## 2025-07-19 DIAGNOSIS — Z95.810 PRESENCE OF AUTOMATIC (IMPLANTABLE) CARDIAC DEFIBRILLATOR: Chronic | ICD-10-CM

## 2025-07-19 DIAGNOSIS — A41.9 SEPSIS, UNSPECIFIED ORGANISM: ICD-10-CM

## 2025-07-19 PROBLEM — Z86.79 PERSONAL HISTORY OF OTHER DISEASES OF THE CIRCULATORY SYSTEM: Chronic | Status: INACTIVE | Noted: 2024-11-01 | Resolved: 2025-07-19

## 2025-07-19 PROBLEM — Z87.09 PERSONAL HISTORY OF OTHER DISEASES OF THE RESPIRATORY SYSTEM: Chronic | Status: INACTIVE | Noted: 2024-11-01 | Resolved: 2025-07-19

## 2025-07-19 LAB
ALBUMIN SERPL ELPH-MCNC: 3 G/DL — LOW (ref 3.5–5.2)
ALBUMIN SERPL ELPH-MCNC: 3.1 G/DL — LOW (ref 3.5–5.2)
ALP SERPL-CCNC: 64 U/L — SIGNIFICANT CHANGE UP (ref 30–115)
ALP SERPL-CCNC: 71 U/L — SIGNIFICANT CHANGE UP (ref 30–115)
ALT FLD-CCNC: 14 U/L — SIGNIFICANT CHANGE UP (ref 0–41)
ALT FLD-CCNC: 16 U/L — SIGNIFICANT CHANGE UP (ref 0–41)
ANION GAP SERPL CALC-SCNC: 14 MMOL/L — SIGNIFICANT CHANGE UP (ref 7–14)
ANION GAP SERPL CALC-SCNC: 7 MMOL/L — SIGNIFICANT CHANGE UP (ref 7–14)
APPEARANCE UR: CLEAR — SIGNIFICANT CHANGE UP
APTT BLD: 31.4 SEC — SIGNIFICANT CHANGE UP (ref 27–39.2)
AST SERPL-CCNC: 30 U/L — SIGNIFICANT CHANGE UP (ref 0–41)
AST SERPL-CCNC: 36 U/L — SIGNIFICANT CHANGE UP (ref 0–41)
BASOPHILS # BLD AUTO: 0.07 K/UL — SIGNIFICANT CHANGE UP (ref 0–0.2)
BASOPHILS NFR BLD AUTO: 2.9 % — HIGH (ref 0–2)
BILIRUB SERPL-MCNC: 1.4 MG/DL — HIGH (ref 0.2–1.2)
BILIRUB SERPL-MCNC: 1.7 MG/DL — HIGH (ref 0.2–1.2)
BILIRUB UR-MCNC: NEGATIVE — SIGNIFICANT CHANGE UP
BUN SERPL-MCNC: 18 MG/DL — SIGNIFICANT CHANGE UP (ref 10–20)
BUN SERPL-MCNC: 18 MG/DL — SIGNIFICANT CHANGE UP (ref 10–20)
CALCIUM SERPL-MCNC: 8.1 MG/DL — LOW (ref 8.4–10.5)
CALCIUM SERPL-MCNC: 8.3 MG/DL — LOW (ref 8.4–10.5)
CHLORIDE SERPL-SCNC: 105 MMOL/L — SIGNIFICANT CHANGE UP (ref 98–110)
CHLORIDE SERPL-SCNC: 105 MMOL/L — SIGNIFICANT CHANGE UP (ref 98–110)
CO2 SERPL-SCNC: 21 MMOL/L — SIGNIFICANT CHANGE UP (ref 17–32)
CO2 SERPL-SCNC: 25 MMOL/L — SIGNIFICANT CHANGE UP (ref 17–32)
COLOR SPEC: SIGNIFICANT CHANGE UP
CREAT SERPL-MCNC: 0.7 MG/DL — SIGNIFICANT CHANGE UP (ref 0.7–1.5)
CREAT SERPL-MCNC: 0.8 MG/DL — SIGNIFICANT CHANGE UP (ref 0.7–1.5)
DIFF PNL FLD: ABNORMAL
EGFR: 89 ML/MIN/1.73M2 — SIGNIFICANT CHANGE UP
EGFR: 89 ML/MIN/1.73M2 — SIGNIFICANT CHANGE UP
EGFR: 93 ML/MIN/1.73M2 — SIGNIFICANT CHANGE UP
EGFR: 93 ML/MIN/1.73M2 — SIGNIFICANT CHANGE UP
EOSINOPHIL # BLD AUTO: 0.01 K/UL — SIGNIFICANT CHANGE UP (ref 0–0.5)
EOSINOPHIL NFR BLD AUTO: 0.4 % — SIGNIFICANT CHANGE UP (ref 0–6)
GLUCOSE SERPL-MCNC: 110 MG/DL — HIGH (ref 70–99)
GLUCOSE SERPL-MCNC: 97 MG/DL — SIGNIFICANT CHANGE UP (ref 70–99)
GLUCOSE UR QL: NEGATIVE MG/DL — SIGNIFICANT CHANGE UP
HCT VFR BLD CALC: 25.3 % — LOW (ref 39–50)
HCT VFR BLD CALC: 27.4 % — LOW (ref 39–50)
HGB BLD-MCNC: 7.9 G/DL — LOW (ref 13–17)
HGB BLD-MCNC: 8.6 G/DL — LOW (ref 13–17)
IMM GRANULOCYTES # BLD AUTO: 0.02 K/UL — SIGNIFICANT CHANGE UP (ref 0–0.07)
IMM GRANULOCYTES NFR BLD AUTO: 0.8 % — SIGNIFICANT CHANGE UP (ref 0–0.9)
IMMATURE PLATELET FRACTION #: 3.2 K/UL — LOW (ref 3.9–12.5)
IMMATURE PLATELET FRACTION #: 3.4 K/UL — LOW (ref 3.9–12.5)
IMMATURE PLATELET FRACTION %: 7.7 % — HIGH (ref 1.6–7.1)
IMMATURE PLATELET FRACTION %: 7.8 % — HIGH (ref 1.6–7.1)
INR BLD: 1.37 RATIO — HIGH (ref 0.65–1.3)
KETONES UR QL: NEGATIVE MG/DL — SIGNIFICANT CHANGE UP
LACTATE SERPL-SCNC: 1.3 MMOL/L — SIGNIFICANT CHANGE UP (ref 0.7–2)
LEUKOCYTE ESTERASE UR-ACNC: ABNORMAL
LYMPHOCYTES # BLD AUTO: 1.12 K/UL — SIGNIFICANT CHANGE UP (ref 1–3.3)
LYMPHOCYTES NFR BLD AUTO: 46.1 % — HIGH (ref 13–44)
MCHC RBC-ENTMCNC: 26.4 PG — LOW (ref 27–34)
MCHC RBC-ENTMCNC: 26.6 PG — LOW (ref 27–34)
MCHC RBC-ENTMCNC: 31.2 G/DL — LOW (ref 32–36)
MCHC RBC-ENTMCNC: 31.4 G/DL — LOW (ref 32–36)
MCV RBC AUTO: 84.6 FL — SIGNIFICANT CHANGE UP (ref 80–100)
MCV RBC AUTO: 84.8 FL — SIGNIFICANT CHANGE UP (ref 80–100)
MONOCYTES # BLD AUTO: 0.42 K/UL — SIGNIFICANT CHANGE UP (ref 0–0.9)
MONOCYTES NFR BLD AUTO: 17.3 % — HIGH (ref 2–14)
NEUTROPHILS # BLD AUTO: 0.79 K/UL — LOW (ref 1.8–7.4)
NEUTROPHILS NFR BLD AUTO: 32.5 % — LOW (ref 43–77)
NITRITE UR-MCNC: NEGATIVE — SIGNIFICANT CHANGE UP
NRBC # BLD AUTO: 0.02 K/UL — HIGH (ref 0–0)
NRBC # BLD AUTO: 0.03 K/UL — HIGH (ref 0–0)
NRBC # FLD: 0.02 K/UL — HIGH (ref 0–0)
NRBC # FLD: 0.03 K/UL — HIGH (ref 0–0)
NRBC BLD AUTO-RTO: 0 /100 WBCS — SIGNIFICANT CHANGE UP (ref 0–0)
NRBC BLD AUTO-RTO: 1 /100 WBCS — HIGH (ref 0–0)
PH UR: 6 — SIGNIFICANT CHANGE UP (ref 5–8)
PLATELET # BLD AUTO: 41 K/UL — LOW (ref 150–400)
PLATELET # BLD AUTO: 43 K/UL — LOW (ref 150–400)
PMV BLD: 11.8 FL — SIGNIFICANT CHANGE UP (ref 7–13)
PMV BLD: SIGNIFICANT CHANGE UP FL (ref 7–13)
POTASSIUM SERPL-MCNC: 3.3 MMOL/L — LOW (ref 3.5–5)
POTASSIUM SERPL-MCNC: 3.5 MMOL/L — SIGNIFICANT CHANGE UP (ref 3.5–5)
POTASSIUM SERPL-SCNC: 3.3 MMOL/L — LOW (ref 3.5–5)
POTASSIUM SERPL-SCNC: 3.5 MMOL/L — SIGNIFICANT CHANGE UP (ref 3.5–5)
PROT SERPL-MCNC: 6.9 G/DL — SIGNIFICANT CHANGE UP (ref 6–8)
PROT SERPL-MCNC: 7.5 G/DL — SIGNIFICANT CHANGE UP (ref 6–8)
PROT UR-MCNC: 30 MG/DL
PROTHROM AB SERPL-ACNC: 16.3 SEC — HIGH (ref 9.95–12.87)
RBC # BLD: 2.99 M/UL — LOW (ref 4.2–5.8)
RBC # BLD: 3.23 M/UL — LOW (ref 4.2–5.8)
RBC # FLD: 18.7 % — HIGH (ref 10.3–14.5)
RBC # FLD: 18.9 % — HIGH (ref 10.3–14.5)
SODIUM SERPL-SCNC: 137 MMOL/L — SIGNIFICANT CHANGE UP (ref 135–146)
SODIUM SERPL-SCNC: 140 MMOL/L — SIGNIFICANT CHANGE UP (ref 135–146)
SP GR SPEC: 1.02 — SIGNIFICANT CHANGE UP (ref 1–1.03)
UROBILINOGEN FLD QL: 1 MG/DL — SIGNIFICANT CHANGE UP (ref 0.2–1)
WBC # BLD: 2.32 K/UL — LOW (ref 3.8–10.5)
WBC # BLD: 2.43 K/UL — LOW (ref 3.8–10.5)
WBC # FLD AUTO: 2.32 K/UL — LOW (ref 3.8–10.5)
WBC # FLD AUTO: 2.43 K/UL — LOW (ref 3.8–10.5)

## 2025-07-19 PROCEDURE — 82746 ASSAY OF FOLIC ACID SERUM: CPT

## 2025-07-19 PROCEDURE — 87640 STAPH A DNA AMP PROBE: CPT

## 2025-07-19 PROCEDURE — 80048 BASIC METABOLIC PNL TOTAL CA: CPT

## 2025-07-19 PROCEDURE — 83521 IG LIGHT CHAINS FREE EACH: CPT

## 2025-07-19 PROCEDURE — 86900 BLOOD TYPING SEROLOGIC ABO: CPT

## 2025-07-19 PROCEDURE — 88285 CHROMOSOME COUNT ADDITIONAL: CPT

## 2025-07-19 PROCEDURE — 86880 COOMBS TEST DIRECT: CPT

## 2025-07-19 PROCEDURE — 36415 COLL VENOUS BLD VENIPUNCTURE: CPT

## 2025-07-19 PROCEDURE — 83540 ASSAY OF IRON: CPT

## 2025-07-19 PROCEDURE — 83010 ASSAY OF HAPTOGLOBIN QUANT: CPT

## 2025-07-19 PROCEDURE — 84155 ASSAY OF PROTEIN SERUM: CPT

## 2025-07-19 PROCEDURE — 86850 RBC ANTIBODY SCREEN: CPT

## 2025-07-19 PROCEDURE — 88275 CYTOGENETICS 100-300: CPT

## 2025-07-19 PROCEDURE — 87205 SMEAR GRAM STAIN: CPT

## 2025-07-19 PROCEDURE — 87641 MR-STAPH DNA AMP PROBE: CPT

## 2025-07-19 PROCEDURE — 83550 IRON BINDING TEST: CPT

## 2025-07-19 PROCEDURE — 87077 CULTURE AEROBIC IDENTIFY: CPT

## 2025-07-19 PROCEDURE — 99223 1ST HOSP IP/OBS HIGH 75: CPT

## 2025-07-19 PROCEDURE — 87899 AGENT NOS ASSAY W/OPTIC: CPT

## 2025-07-19 PROCEDURE — 82728 ASSAY OF FERRITIN: CPT

## 2025-07-19 PROCEDURE — 85025 COMPLETE CBC W/AUTO DIFF WBC: CPT

## 2025-07-19 PROCEDURE — 36430 TRANSFUSION BLD/BLD COMPNT: CPT

## 2025-07-19 PROCEDURE — 97162 PT EVAL MOD COMPLEX 30 MIN: CPT | Mod: GP

## 2025-07-19 PROCEDURE — 88184 FLOWCYTOMETRY/ TC 1 MARKER: CPT

## 2025-07-19 PROCEDURE — 82607 VITAMIN B-12: CPT

## 2025-07-19 PROCEDURE — 87186 SC STD MICRODIL/AGAR DIL: CPT

## 2025-07-19 PROCEDURE — 83615 LACTATE (LD) (LDH) ENZYME: CPT

## 2025-07-19 PROCEDURE — 88264 CHROMOSOME ANALYSIS 20-25: CPT

## 2025-07-19 PROCEDURE — 71045 X-RAY EXAM CHEST 1 VIEW: CPT

## 2025-07-19 PROCEDURE — 93306 TTE W/DOPPLER COMPLETE: CPT

## 2025-07-19 PROCEDURE — 88237 TISSUE CULTURE BONE MARROW: CPT

## 2025-07-19 PROCEDURE — 86901 BLOOD TYPING SEROLOGIC RH(D): CPT

## 2025-07-19 PROCEDURE — 94640 AIRWAY INHALATION TREATMENT: CPT

## 2025-07-19 PROCEDURE — 71045 X-RAY EXAM CHEST 1 VIEW: CPT | Mod: 26

## 2025-07-19 PROCEDURE — 84100 ASSAY OF PHOSPHORUS: CPT

## 2025-07-19 PROCEDURE — 71250 CT THORAX DX C-: CPT

## 2025-07-19 PROCEDURE — 80053 COMPREHEN METABOLIC PANEL: CPT

## 2025-07-19 PROCEDURE — 85045 AUTOMATED RETICULOCYTE COUNT: CPT

## 2025-07-19 PROCEDURE — 86160 COMPLEMENT ANTIGEN: CPT

## 2025-07-19 PROCEDURE — 81001 URINALYSIS AUTO W/SCOPE: CPT

## 2025-07-19 PROCEDURE — 84165 PROTEIN E-PHORESIS SERUM: CPT

## 2025-07-19 PROCEDURE — 85027 COMPLETE CBC AUTOMATED: CPT

## 2025-07-19 PROCEDURE — 86334 IMMUNOFIX E-PHORESIS SERUM: CPT

## 2025-07-19 PROCEDURE — 87040 BLOOD CULTURE FOR BACTERIA: CPT

## 2025-07-19 PROCEDURE — P9016: CPT

## 2025-07-19 PROCEDURE — 88271 CYTOGENETICS DNA PROBE: CPT

## 2025-07-19 PROCEDURE — 83735 ASSAY OF MAGNESIUM: CPT

## 2025-07-19 PROCEDURE — 88185 FLOWCYTOMETRY/TC ADD-ON: CPT

## 2025-07-19 PROCEDURE — 87086 URINE CULTURE/COLONY COUNT: CPT

## 2025-07-19 RX ORDER — FOLIC ACID 1 MG/1
1 TABLET ORAL DAILY
Refills: 0 | Status: DISCONTINUED | OUTPATIENT
Start: 2025-07-19 | End: 2025-07-25

## 2025-07-19 RX ORDER — AZITHROMYCIN 250 MG
500 CAPSULE ORAL EVERY 24 HOURS
Refills: 0 | Status: DISCONTINUED | OUTPATIENT
Start: 2025-07-19 | End: 2025-07-22

## 2025-07-19 RX ORDER — MAGNESIUM, ALUMINUM HYDROXIDE 200-200 MG
30 TABLET,CHEWABLE ORAL EVERY 4 HOURS
Refills: 0 | Status: DISCONTINUED | OUTPATIENT
Start: 2025-07-19 | End: 2025-07-20

## 2025-07-19 RX ORDER — IPRATROPIUM BROMIDE AND ALBUTEROL SULFATE .5; 2.5 MG/3ML; MG/3ML
3 SOLUTION RESPIRATORY (INHALATION) EVERY 6 HOURS
Refills: 0 | Status: DISCONTINUED | OUTPATIENT
Start: 2025-07-19 | End: 2025-07-25

## 2025-07-19 RX ORDER — METOPROLOL SUCCINATE 50 MG/1
50 TABLET, EXTENDED RELEASE ORAL DAILY
Refills: 0 | Status: DISCONTINUED | OUTPATIENT
Start: 2025-07-19 | End: 2025-07-25

## 2025-07-19 RX ORDER — ACETAMINOPHEN 500 MG/5ML
650 LIQUID (ML) ORAL EVERY 6 HOURS
Refills: 0 | Status: DISCONTINUED | OUTPATIENT
Start: 2025-07-19 | End: 2025-07-25

## 2025-07-19 RX ORDER — CEFEPIME 2 G/20ML
1000 INJECTION, POWDER, FOR SOLUTION INTRAVENOUS EVERY 12 HOURS
Refills: 0 | Status: DISCONTINUED | OUTPATIENT
Start: 2025-07-19 | End: 2025-07-20

## 2025-07-19 RX ORDER — ONDANSETRON HCL/PF 4 MG/2 ML
4 VIAL (ML) INJECTION EVERY 8 HOURS
Refills: 0 | Status: DISCONTINUED | OUTPATIENT
Start: 2025-07-19 | End: 2025-07-25

## 2025-07-19 RX ORDER — LOSARTAN POTASSIUM 100 MG/1
25 TABLET, FILM COATED ORAL DAILY
Refills: 0 | Status: DISCONTINUED | OUTPATIENT
Start: 2025-07-19 | End: 2025-07-20

## 2025-07-19 RX ORDER — MELATONIN 5 MG
3 TABLET ORAL AT BEDTIME
Refills: 0 | Status: DISCONTINUED | OUTPATIENT
Start: 2025-07-19 | End: 2025-07-20

## 2025-07-19 RX ORDER — BENZONATATE 100 MG
100 CAPSULE ORAL EVERY 8 HOURS
Refills: 0 | Status: COMPLETED | OUTPATIENT
Start: 2025-07-19 | End: 2025-07-22

## 2025-07-19 RX ORDER — AZITHROMYCIN 250 MG
500 CAPSULE ORAL ONCE
Refills: 0 | Status: COMPLETED | OUTPATIENT
Start: 2025-07-19 | End: 2025-07-19

## 2025-07-19 RX ORDER — FUROSEMIDE 10 MG/ML
20 INJECTION INTRAMUSCULAR; INTRAVENOUS DAILY
Refills: 0 | Status: DISCONTINUED | OUTPATIENT
Start: 2025-07-19 | End: 2025-07-22

## 2025-07-19 RX ORDER — BUDESONIDE AND FORMOTEROL FUMARATE DIHYDRATE 80; 4.5 UG/1; UG/1
2 AEROSOL RESPIRATORY (INHALATION)
Refills: 0 | DISCHARGE

## 2025-07-19 RX ORDER — POTASSIUM BICARBONATE/CIT AC 25 MEQ
10 TABLET, EFFERVESCENT ORAL DAILY
Refills: 0 | Status: DISCONTINUED | OUTPATIENT
Start: 2025-07-19 | End: 2025-07-19

## 2025-07-19 RX ORDER — IPRATROPIUM BROMIDE AND ALBUTEROL SULFATE .5; 2.5 MG/3ML; MG/3ML
3 SOLUTION RESPIRATORY (INHALATION) EVERY 6 HOURS
Refills: 0 | Status: DISCONTINUED | OUTPATIENT
Start: 2025-07-19 | End: 2025-07-20

## 2025-07-19 RX ORDER — DEXTROMETHORPHAN HBR, GUAIFENESIN 20; 200 MG/10ML; MG/10ML
10 SOLUTION ORAL EVERY 8 HOURS
Refills: 0 | Status: COMPLETED | OUTPATIENT
Start: 2025-07-19 | End: 2025-07-22

## 2025-07-19 RX ADMIN — IPRATROPIUM BROMIDE AND ALBUTEROL SULFATE 3 MILLILITER(S): .5; 2.5 SOLUTION RESPIRATORY (INHALATION) at 19:52

## 2025-07-19 RX ADMIN — IPRATROPIUM BROMIDE AND ALBUTEROL SULFATE 3 MILLILITER(S): .5; 2.5 SOLUTION RESPIRATORY (INHALATION) at 15:18

## 2025-07-19 RX ADMIN — DEXTROMETHORPHAN HBR, GUAIFENESIN 10 MILLILITER(S): 20; 200 SOLUTION ORAL at 21:50

## 2025-07-19 RX ADMIN — CEFEPIME 100 MILLIGRAM(S): 2 INJECTION, POWDER, FOR SOLUTION INTRAVENOUS at 06:56

## 2025-07-19 RX ADMIN — FOLIC ACID 1 MILLIGRAM(S): 1 TABLET ORAL at 11:29

## 2025-07-19 RX ADMIN — Medication 1 DOSE(S): at 21:50

## 2025-07-19 RX ADMIN — Medication 100 MILLIGRAM(S): at 13:31

## 2025-07-19 RX ADMIN — DEXTROMETHORPHAN HBR, GUAIFENESIN 10 MILLILITER(S): 20; 200 SOLUTION ORAL at 13:31

## 2025-07-19 RX ADMIN — SODIUM CHLORIDE 1800 MILLILITER(S): 9 INJECTION, SOLUTION INTRAVENOUS at 00:02

## 2025-07-19 RX ADMIN — CEFEPIME 2000 MILLIGRAM(S): 2 INJECTION, POWDER, FOR SOLUTION INTRAVENOUS at 00:40

## 2025-07-19 RX ADMIN — SODIUM CHLORIDE 1800 MILLILITER(S): 9 INJECTION, SOLUTION INTRAVENOUS at 01:00

## 2025-07-19 RX ADMIN — Medication 100 MILLIGRAM(S): at 21:51

## 2025-07-19 RX ADMIN — CEFEPIME 100 MILLIGRAM(S): 2 INJECTION, POWDER, FOR SOLUTION INTRAVENOUS at 00:03

## 2025-07-19 RX ADMIN — CEFEPIME 100 MILLIGRAM(S): 2 INJECTION, POWDER, FOR SOLUTION INTRAVENOUS at 17:27

## 2025-07-19 RX ADMIN — Medication 650 MILLIGRAM(S): at 00:02

## 2025-07-19 RX ADMIN — Medication 650 MILLIGRAM(S): at 01:24

## 2025-07-19 RX ADMIN — LOSARTAN POTASSIUM 25 MILLIGRAM(S): 100 TABLET, FILM COATED ORAL at 06:56

## 2025-07-19 RX ADMIN — Medication 3 MILLIGRAM(S): at 23:02

## 2025-07-19 RX ADMIN — METOPROLOL SUCCINATE 50 MILLIGRAM(S): 50 TABLET, EXTENDED RELEASE ORAL at 06:56

## 2025-07-19 RX ADMIN — Medication 250 MILLIGRAM(S): at 01:06

## 2025-07-19 RX ADMIN — IPRATROPIUM BROMIDE AND ALBUTEROL SULFATE 3 MILLILITER(S): .5; 2.5 SOLUTION RESPIRATORY (INHALATION) at 09:17

## 2025-07-19 RX ADMIN — FUROSEMIDE 20 MILLIGRAM(S): 10 INJECTION INTRAMUSCULAR; INTRAVENOUS at 06:57

## 2025-07-19 RX ADMIN — Medication 10 MILLIEQUIVALENT(S): at 11:29

## 2025-07-20 LAB
ANION GAP SERPL CALC-SCNC: 12 MMOL/L — SIGNIFICANT CHANGE UP (ref 7–14)
BASOPHILS # BLD AUTO: 0.05 K/UL — SIGNIFICANT CHANGE UP (ref 0–0.2)
BASOPHILS # BLD AUTO: 0.08 K/UL — SIGNIFICANT CHANGE UP (ref 0–0.2)
BASOPHILS NFR BLD AUTO: 2.3 % — HIGH (ref 0–2)
BASOPHILS NFR BLD AUTO: 4.1 % — HIGH (ref 0–2)
BUN SERPL-MCNC: 16 MG/DL — SIGNIFICANT CHANGE UP (ref 10–20)
CALCIUM SERPL-MCNC: 7.9 MG/DL — LOW (ref 8.4–10.5)
CHLORIDE SERPL-SCNC: 102 MMOL/L — SIGNIFICANT CHANGE UP (ref 98–110)
CO2 SERPL-SCNC: 23 MMOL/L — SIGNIFICANT CHANGE UP (ref 17–32)
CREAT SERPL-MCNC: 0.7 MG/DL — SIGNIFICANT CHANGE UP (ref 0.7–1.5)
EGFR: 93 ML/MIN/1.73M2 — SIGNIFICANT CHANGE UP
EGFR: 93 ML/MIN/1.73M2 — SIGNIFICANT CHANGE UP
EOSINOPHIL # BLD AUTO: 0.01 K/UL — SIGNIFICANT CHANGE UP (ref 0–0.5)
EOSINOPHIL # BLD AUTO: 0.02 K/UL — SIGNIFICANT CHANGE UP (ref 0–0.5)
EOSINOPHIL NFR BLD AUTO: 0.5 % — SIGNIFICANT CHANGE UP (ref 0–6)
EOSINOPHIL NFR BLD AUTO: 0.9 % — SIGNIFICANT CHANGE UP (ref 0–6)
GLUCOSE SERPL-MCNC: 142 MG/DL — HIGH (ref 70–99)
HCT VFR BLD CALC: 20.8 % — CRITICAL LOW (ref 39–50)
HCT VFR BLD CALC: 23 % — LOW (ref 39–50)
HGB BLD-MCNC: 6.6 G/DL — CRITICAL LOW (ref 13–17)
HGB BLD-MCNC: 7.2 G/DL — LOW (ref 13–17)
IMM GRANULOCYTES # BLD AUTO: 0.02 K/UL — SIGNIFICANT CHANGE UP (ref 0–0.07)
IMM GRANULOCYTES # BLD AUTO: 0.02 K/UL — SIGNIFICANT CHANGE UP (ref 0–0.07)
IMM GRANULOCYTES NFR BLD AUTO: 0.9 % — SIGNIFICANT CHANGE UP (ref 0–0.9)
IMM GRANULOCYTES NFR BLD AUTO: 1 % — HIGH (ref 0–0.9)
IMMATURE PLATELET FRACTION #: 2.6 K/UL — LOW (ref 3.9–12.5)
IMMATURE PLATELET FRACTION #: 2.8 K/UL — LOW (ref 3.9–12.5)
IMMATURE PLATELET FRACTION %: 6.6 % — SIGNIFICANT CHANGE UP (ref 1.6–7.1)
IMMATURE PLATELET FRACTION %: 7.1 % — SIGNIFICANT CHANGE UP (ref 1.6–7.1)
LYMPHOCYTES # BLD AUTO: 0.89 K/UL — LOW (ref 1–3.3)
LYMPHOCYTES # BLD AUTO: 1.03 K/UL — SIGNIFICANT CHANGE UP (ref 1–3.3)
LYMPHOCYTES NFR BLD AUTO: 45.9 % — HIGH (ref 13–44)
LYMPHOCYTES NFR BLD AUTO: 46.6 % — HIGH (ref 13–44)
MCHC RBC-ENTMCNC: 26.5 PG — LOW (ref 27–34)
MCHC RBC-ENTMCNC: 26.7 PG — LOW (ref 27–34)
MCHC RBC-ENTMCNC: 31.3 G/DL — LOW (ref 32–36)
MCHC RBC-ENTMCNC: 31.7 G/DL — LOW (ref 32–36)
MCV RBC AUTO: 84.2 FL — SIGNIFICANT CHANGE UP (ref 80–100)
MCV RBC AUTO: 84.6 FL — SIGNIFICANT CHANGE UP (ref 80–100)
MONOCYTES # BLD AUTO: 0.15 K/UL — SIGNIFICANT CHANGE UP (ref 0–0.9)
MONOCYTES # BLD AUTO: 0.37 K/UL — SIGNIFICANT CHANGE UP (ref 0–0.9)
MONOCYTES NFR BLD AUTO: 16.7 % — HIGH (ref 2–14)
MONOCYTES NFR BLD AUTO: 7.7 % — SIGNIFICANT CHANGE UP (ref 2–14)
NEUTROPHILS # BLD AUTO: 0.72 K/UL — LOW (ref 1.8–7.4)
NEUTROPHILS # BLD AUTO: 0.79 K/UL — LOW (ref 1.8–7.4)
NEUTROPHILS NFR BLD AUTO: 32.6 % — LOW (ref 43–77)
NEUTROPHILS NFR BLD AUTO: 40.8 % — LOW (ref 43–77)
NRBC # BLD AUTO: 0.02 K/UL — HIGH (ref 0–0)
NRBC # BLD AUTO: 0.03 K/UL — HIGH (ref 0–0)
NRBC # FLD: 0.02 K/UL — HIGH (ref 0–0)
NRBC # FLD: 0.03 K/UL — HIGH (ref 0–0)
NRBC BLD AUTO-RTO: 1 /100 WBCS — HIGH (ref 0–0)
NRBC BLD AUTO-RTO: 1 /100 WBCS — HIGH (ref 0–0)
PLATELET # BLD AUTO: 39 K/UL — LOW (ref 150–400)
PLATELET # BLD AUTO: 40 K/UL — LOW (ref 150–400)
PMV BLD: 10.2 FL — SIGNIFICANT CHANGE UP (ref 7–13)
PMV BLD: 11 FL — SIGNIFICANT CHANGE UP (ref 7–13)
POTASSIUM SERPL-MCNC: 3.1 MMOL/L — LOW (ref 3.5–5)
POTASSIUM SERPL-SCNC: 3.1 MMOL/L — LOW (ref 3.5–5)
RBC # BLD: 2.47 M/UL — LOW (ref 4.2–5.8)
RBC # BLD: 2.72 M/UL — LOW (ref 4.2–5.8)
RBC # FLD: 19 % — HIGH (ref 10.3–14.5)
RBC # FLD: 19.1 % — HIGH (ref 10.3–14.5)
SODIUM SERPL-SCNC: 137 MMOL/L — SIGNIFICANT CHANGE UP (ref 135–146)
WBC # BLD: 1.94 K/UL — LOW (ref 3.8–10.5)
WBC # BLD: 2.21 K/UL — LOW (ref 3.8–10.5)
WBC # FLD AUTO: 1.94 K/UL — LOW (ref 3.8–10.5)
WBC # FLD AUTO: 2.21 K/UL — LOW (ref 3.8–10.5)

## 2025-07-20 PROCEDURE — 99233 SBSQ HOSP IP/OBS HIGH 50: CPT

## 2025-07-20 PROCEDURE — 36430 TRANSFUSION BLD/BLD COMPNT: CPT

## 2025-07-20 RX ORDER — TEMAZEPAM 15 MG/1
15 CAPSULE ORAL AT BEDTIME
Refills: 0 | Status: DISCONTINUED | OUTPATIENT
Start: 2025-07-20 | End: 2025-07-20

## 2025-07-20 RX ORDER — ACETAMINOPHEN 500 MG/5ML
1000 LIQUID (ML) ORAL ONCE
Refills: 0 | Status: COMPLETED | OUTPATIENT
Start: 2025-07-20 | End: 2025-07-20

## 2025-07-20 RX ORDER — CEFTRIAXONE 500 MG/1
2000 INJECTION, POWDER, FOR SOLUTION INTRAMUSCULAR; INTRAVENOUS EVERY 24 HOURS
Refills: 0 | Status: DISCONTINUED | OUTPATIENT
Start: 2025-07-20 | End: 2025-07-21

## 2025-07-20 RX ORDER — MELATONIN 5 MG
5 TABLET ORAL AT BEDTIME
Refills: 0 | Status: DISCONTINUED | OUTPATIENT
Start: 2025-07-20 | End: 2025-07-25

## 2025-07-20 RX ADMIN — FOLIC ACID 1 MILLIGRAM(S): 1 TABLET ORAL at 12:19

## 2025-07-20 RX ADMIN — Medication 40 MILLIEQUIVALENT(S): at 12:19

## 2025-07-20 RX ADMIN — Medication 400 MILLIGRAM(S): at 14:18

## 2025-07-20 RX ADMIN — Medication 650 MILLIGRAM(S): at 13:13

## 2025-07-20 RX ADMIN — Medication 100 MILLIGRAM(S): at 13:13

## 2025-07-20 RX ADMIN — Medication 5 MILLIGRAM(S): at 21:16

## 2025-07-20 RX ADMIN — TEMAZEPAM 15 MILLIGRAM(S): 15 CAPSULE ORAL at 21:16

## 2025-07-20 RX ADMIN — IPRATROPIUM BROMIDE AND ALBUTEROL SULFATE 3 MILLILITER(S): .5; 2.5 SOLUTION RESPIRATORY (INHALATION) at 09:18

## 2025-07-20 RX ADMIN — Medication 1 DOSE(S): at 09:12

## 2025-07-20 RX ADMIN — DEXTROMETHORPHAN HBR, GUAIFENESIN 10 MILLILITER(S): 20; 200 SOLUTION ORAL at 21:17

## 2025-07-20 RX ADMIN — IPRATROPIUM BROMIDE AND ALBUTEROL SULFATE 3 MILLILITER(S): .5; 2.5 SOLUTION RESPIRATORY (INHALATION) at 04:54

## 2025-07-20 RX ADMIN — DEXTROMETHORPHAN HBR, GUAIFENESIN 10 MILLILITER(S): 20; 200 SOLUTION ORAL at 05:34

## 2025-07-20 RX ADMIN — Medication 650 MILLIGRAM(S): at 13:43

## 2025-07-20 RX ADMIN — Medication 1 DOSE(S): at 20:18

## 2025-07-20 RX ADMIN — Medication 250 MILLIGRAM(S): at 00:44

## 2025-07-20 RX ADMIN — Medication 250 MILLIGRAM(S): at 23:52

## 2025-07-20 RX ADMIN — Medication 100 MILLIGRAM(S): at 21:16

## 2025-07-20 RX ADMIN — Medication 100 MILLIGRAM(S): at 05:34

## 2025-07-20 RX ADMIN — CEFTRIAXONE 100 MILLIGRAM(S): 500 INJECTION, POWDER, FOR SOLUTION INTRAMUSCULAR; INTRAVENOUS at 12:19

## 2025-07-20 RX ADMIN — IPRATROPIUM BROMIDE AND ALBUTEROL SULFATE 3 MILLILITER(S): .5; 2.5 SOLUTION RESPIRATORY (INHALATION) at 19:40

## 2025-07-20 RX ADMIN — IPRATROPIUM BROMIDE AND ALBUTEROL SULFATE 3 MILLILITER(S): .5; 2.5 SOLUTION RESPIRATORY (INHALATION) at 13:51

## 2025-07-20 RX ADMIN — DEXTROMETHORPHAN HBR, GUAIFENESIN 10 MILLILITER(S): 20; 200 SOLUTION ORAL at 13:13

## 2025-07-20 RX ADMIN — Medication 1000 MILLIGRAM(S): at 14:48

## 2025-07-20 RX ADMIN — Medication 1 APPLICATION(S): at 06:37

## 2025-07-20 RX ADMIN — CEFEPIME 100 MILLIGRAM(S): 2 INJECTION, POWDER, FOR SOLUTION INTRAVENOUS at 05:40

## 2025-07-21 LAB
ANION GAP SERPL CALC-SCNC: 13 MMOL/L — SIGNIFICANT CHANGE UP (ref 7–14)
BASOPHILS # BLD AUTO: 0.07 K/UL — SIGNIFICANT CHANGE UP (ref 0–0.2)
BASOPHILS NFR BLD AUTO: 2.8 % — HIGH (ref 0–2)
BUN SERPL-MCNC: 15 MG/DL — SIGNIFICANT CHANGE UP (ref 10–20)
CALCIUM SERPL-MCNC: 7.9 MG/DL — LOW (ref 8.4–10.5)
CHLORIDE SERPL-SCNC: 103 MMOL/L — SIGNIFICANT CHANGE UP (ref 98–110)
CO2 SERPL-SCNC: 22 MMOL/L — SIGNIFICANT CHANGE UP (ref 17–32)
CREAT SERPL-MCNC: 0.9 MG/DL — SIGNIFICANT CHANGE UP (ref 0.7–1.5)
EGFR: 86 ML/MIN/1.73M2 — SIGNIFICANT CHANGE UP
EGFR: 86 ML/MIN/1.73M2 — SIGNIFICANT CHANGE UP
EOSINOPHIL # BLD AUTO: 0.03 K/UL — SIGNIFICANT CHANGE UP (ref 0–0.5)
EOSINOPHIL NFR BLD AUTO: 1.2 % — SIGNIFICANT CHANGE UP (ref 0–6)
GLUCOSE SERPL-MCNC: 107 MG/DL — HIGH (ref 70–99)
HCT VFR BLD CALC: 23.8 % — LOW (ref 39–50)
HGB BLD-MCNC: 7.6 G/DL — LOW (ref 13–17)
IMM GRANULOCYTES # BLD AUTO: 0.03 K/UL — SIGNIFICANT CHANGE UP (ref 0–0.07)
IMM GRANULOCYTES NFR BLD AUTO: 1.2 % — HIGH (ref 0–0.9)
IMMATURE PLATELET FRACTION #: 2.6 K/UL — LOW (ref 3.9–12.5)
IMMATURE PLATELET FRACTION %: 6.5 % — SIGNIFICANT CHANGE UP (ref 1.6–7.1)
LYMPHOCYTES # BLD AUTO: 1.09 K/UL — SIGNIFICANT CHANGE UP (ref 1–3.3)
LYMPHOCYTES NFR BLD AUTO: 42.9 % — SIGNIFICANT CHANGE UP (ref 13–44)
MAGNESIUM SERPL-MCNC: 2 MG/DL — SIGNIFICANT CHANGE UP (ref 1.8–2.4)
MCHC RBC-ENTMCNC: 27.1 PG — SIGNIFICANT CHANGE UP (ref 27–34)
MCHC RBC-ENTMCNC: 31.9 G/DL — LOW (ref 32–36)
MCV RBC AUTO: 85 FL — SIGNIFICANT CHANGE UP (ref 80–100)
MONOCYTES # BLD AUTO: 0.42 K/UL — SIGNIFICANT CHANGE UP (ref 0–0.9)
MONOCYTES NFR BLD AUTO: 16.5 % — HIGH (ref 2–14)
NEUTROPHILS # BLD AUTO: 0.9 K/UL — LOW (ref 1.8–7.4)
NEUTROPHILS NFR BLD AUTO: 35.4 % — LOW (ref 43–77)
NRBC # BLD AUTO: 0.03 K/UL — HIGH (ref 0–0)
NRBC # FLD: 0.03 K/UL — HIGH (ref 0–0)
NRBC BLD AUTO-RTO: 1 /100 WBCS — HIGH (ref 0–0)
PLATELET # BLD AUTO: 40 K/UL — LOW (ref 150–400)
PMV BLD: 11.3 FL — SIGNIFICANT CHANGE UP (ref 7–13)
POTASSIUM SERPL-MCNC: 3.3 MMOL/L — LOW (ref 3.5–5)
POTASSIUM SERPL-SCNC: 3.3 MMOL/L — LOW (ref 3.5–5)
RBC # BLD: 2.8 M/UL — LOW (ref 4.2–5.8)
RBC # FLD: 18.3 % — HIGH (ref 10.3–14.5)
SODIUM SERPL-SCNC: 138 MMOL/L — SIGNIFICANT CHANGE UP (ref 135–146)
WBC # BLD: 2.54 K/UL — LOW (ref 3.8–10.5)
WBC # FLD AUTO: 2.54 K/UL — LOW (ref 3.8–10.5)

## 2025-07-21 PROCEDURE — 36430 TRANSFUSION BLD/BLD COMPNT: CPT

## 2025-07-21 PROCEDURE — 99233 SBSQ HOSP IP/OBS HIGH 50: CPT

## 2025-07-21 PROCEDURE — 71045 X-RAY EXAM CHEST 1 VIEW: CPT | Mod: 26

## 2025-07-21 RX ORDER — CEFEPIME 2 G/20ML
INJECTION, POWDER, FOR SOLUTION INTRAVENOUS
Refills: 0 | Status: DISCONTINUED | OUTPATIENT
Start: 2025-07-21 | End: 2025-07-25

## 2025-07-21 RX ORDER — B1/B2/B3/B5/B6/B12/VIT C/FOLIC 500-0.5 MG
1 TABLET ORAL DAILY
Refills: 0 | Status: DISCONTINUED | OUTPATIENT
Start: 2025-07-21 | End: 2025-07-25

## 2025-07-21 RX ORDER — CEFEPIME 2 G/20ML
2000 INJECTION, POWDER, FOR SOLUTION INTRAVENOUS EVERY 8 HOURS
Refills: 0 | Status: DISCONTINUED | OUTPATIENT
Start: 2025-07-21 | End: 2025-07-25

## 2025-07-21 RX ORDER — BUTYROSPERMUM PARKII(SHEA BUTTER), SIMMONDSIA CHINENSIS (JOJOBA) SEED OIL, ALOE BARBADENSIS LEAF EXTRACT .01; 1; 3.5 G/100G; G/100G; G/100G
250 LIQUID TOPICAL
Refills: 0 | Status: DISCONTINUED | OUTPATIENT
Start: 2025-07-21 | End: 2025-07-25

## 2025-07-21 RX ORDER — FUROSEMIDE 10 MG/ML
20 INJECTION INTRAMUSCULAR; INTRAVENOUS ONCE
Refills: 0 | Status: COMPLETED | OUTPATIENT
Start: 2025-07-21 | End: 2025-07-21

## 2025-07-21 RX ORDER — KETOROLAC TROMETHAMINE 30 MG/ML
15 INJECTION, SOLUTION INTRAMUSCULAR; INTRAVENOUS ONCE
Refills: 0 | Status: DISCONTINUED | OUTPATIENT
Start: 2025-07-21 | End: 2025-07-21

## 2025-07-21 RX ORDER — CEFEPIME 2 G/20ML
2000 INJECTION, POWDER, FOR SOLUTION INTRAVENOUS ONCE
Refills: 0 | Status: COMPLETED | OUTPATIENT
Start: 2025-07-21 | End: 2025-07-21

## 2025-07-21 RX ORDER — FILGRASTIM 300 UG/.5ML
300 INJECTION, SOLUTION INTRAVENOUS; SUBCUTANEOUS ONCE
Refills: 0 | Status: COMPLETED | OUTPATIENT
Start: 2025-07-21 | End: 2025-07-21

## 2025-07-21 RX ADMIN — DEXTROMETHORPHAN HBR, GUAIFENESIN 10 MILLILITER(S): 20; 200 SOLUTION ORAL at 05:53

## 2025-07-21 RX ADMIN — Medication 40 MILLIEQUIVALENT(S): at 12:12

## 2025-07-21 RX ADMIN — Medication 650 MILLIGRAM(S): at 23:19

## 2025-07-21 RX ADMIN — IPRATROPIUM BROMIDE AND ALBUTEROL SULFATE 3 MILLILITER(S): .5; 2.5 SOLUTION RESPIRATORY (INHALATION) at 14:06

## 2025-07-21 RX ADMIN — FUROSEMIDE 20 MILLIGRAM(S): 10 INJECTION INTRAMUSCULAR; INTRAVENOUS at 17:02

## 2025-07-21 RX ADMIN — Medication 5 MILLIGRAM(S): at 22:16

## 2025-07-21 RX ADMIN — Medication 1 DOSE(S): at 20:58

## 2025-07-21 RX ADMIN — Medication 650 MILLIGRAM(S): at 16:46

## 2025-07-21 RX ADMIN — FOLIC ACID 1 MILLIGRAM(S): 1 TABLET ORAL at 12:12

## 2025-07-21 RX ADMIN — Medication 650 MILLIGRAM(S): at 04:52

## 2025-07-21 RX ADMIN — CEFTRIAXONE 100 MILLIGRAM(S): 500 INJECTION, POWDER, FOR SOLUTION INTRAMUSCULAR; INTRAVENOUS at 12:12

## 2025-07-21 RX ADMIN — Medication 100 MILLIGRAM(S): at 05:53

## 2025-07-21 RX ADMIN — Medication 1 DOSE(S): at 12:12

## 2025-07-21 RX ADMIN — METOPROLOL SUCCINATE 50 MILLIGRAM(S): 50 TABLET, EXTENDED RELEASE ORAL at 05:52

## 2025-07-21 RX ADMIN — FUROSEMIDE 20 MILLIGRAM(S): 10 INJECTION INTRAMUSCULAR; INTRAVENOUS at 05:52

## 2025-07-21 RX ADMIN — Medication 250 MILLIGRAM(S): at 23:20

## 2025-07-21 RX ADMIN — BUTYROSPERMUM PARKII(SHEA BUTTER), SIMMONDSIA CHINENSIS (JOJOBA) SEED OIL, ALOE BARBADENSIS LEAF EXTRACT 250 MILLIGRAM(S): .01; 1; 3.5 LIQUID TOPICAL at 17:58

## 2025-07-21 RX ADMIN — DEXTROMETHORPHAN HBR, GUAIFENESIN 10 MILLILITER(S): 20; 200 SOLUTION ORAL at 22:15

## 2025-07-21 RX ADMIN — DEXTROMETHORPHAN HBR, GUAIFENESIN 10 MILLILITER(S): 20; 200 SOLUTION ORAL at 13:53

## 2025-07-21 RX ADMIN — Medication 100 MILLIGRAM(S): at 22:15

## 2025-07-21 RX ADMIN — CEFEPIME 100 MILLIGRAM(S): 2 INJECTION, POWDER, FOR SOLUTION INTRAVENOUS at 13:53

## 2025-07-21 RX ADMIN — IPRATROPIUM BROMIDE AND ALBUTEROL SULFATE 3 MILLILITER(S): .5; 2.5 SOLUTION RESPIRATORY (INHALATION) at 07:09

## 2025-07-21 RX ADMIN — IPRATROPIUM BROMIDE AND ALBUTEROL SULFATE 3 MILLILITER(S): .5; 2.5 SOLUTION RESPIRATORY (INHALATION) at 04:04

## 2025-07-21 RX ADMIN — Medication 1 APPLICATION(S): at 06:28

## 2025-07-21 RX ADMIN — KETOROLAC TROMETHAMINE 15 MILLIGRAM(S): 30 INJECTION, SOLUTION INTRAMUSCULAR; INTRAVENOUS at 06:28

## 2025-07-21 RX ADMIN — Medication 650 MILLIGRAM(S): at 05:52

## 2025-07-21 RX ADMIN — IPRATROPIUM BROMIDE AND ALBUTEROL SULFATE 3 MILLILITER(S): .5; 2.5 SOLUTION RESPIRATORY (INHALATION) at 21:13

## 2025-07-21 RX ADMIN — KETOROLAC TROMETHAMINE 15 MILLIGRAM(S): 30 INJECTION, SOLUTION INTRAMUSCULAR; INTRAVENOUS at 06:35

## 2025-07-21 RX ADMIN — CEFEPIME 100 MILLIGRAM(S): 2 INJECTION, POWDER, FOR SOLUTION INTRAVENOUS at 22:17

## 2025-07-21 RX ADMIN — Medication 100 MILLIGRAM(S): at 13:53

## 2025-07-21 RX ADMIN — Medication 650 MILLIGRAM(S): at 17:16

## 2025-07-22 DIAGNOSIS — Z51.5 ENCOUNTER FOR PALLIATIVE CARE: ICD-10-CM

## 2025-07-22 DIAGNOSIS — Z71.89 OTHER SPECIFIED COUNSELING: ICD-10-CM

## 2025-07-22 DIAGNOSIS — J18.9 PNEUMONIA, UNSPECIFIED ORGANISM: ICD-10-CM

## 2025-07-22 DIAGNOSIS — J44.9 CHRONIC OBSTRUCTIVE PULMONARY DISEASE, UNSPECIFIED: ICD-10-CM

## 2025-07-22 LAB
ALBUMIN SERPL ELPH-MCNC: 2.9 G/DL — LOW (ref 3.5–5.2)
ALP SERPL-CCNC: 64 U/L — SIGNIFICANT CHANGE UP (ref 30–115)
ALT FLD-CCNC: 14 U/L — SIGNIFICANT CHANGE UP (ref 0–41)
ANION GAP SERPL CALC-SCNC: 11 MMOL/L — SIGNIFICANT CHANGE UP (ref 7–14)
AST SERPL-CCNC: 27 U/L — SIGNIFICANT CHANGE UP (ref 0–41)
BASOPHILS # BLD AUTO: 0.09 K/UL — SIGNIFICANT CHANGE UP (ref 0–0.2)
BASOPHILS NFR BLD AUTO: 3.2 % — HIGH (ref 0–2)
BILIRUB SERPL-MCNC: 1.1 MG/DL — SIGNIFICANT CHANGE UP (ref 0.2–1.2)
BUN SERPL-MCNC: 17 MG/DL — SIGNIFICANT CHANGE UP (ref 10–20)
CALCIUM SERPL-MCNC: 7.8 MG/DL — LOW (ref 8.4–10.5)
CHLORIDE SERPL-SCNC: 104 MMOL/L — SIGNIFICANT CHANGE UP (ref 98–110)
CO2 SERPL-SCNC: 24 MMOL/L — SIGNIFICANT CHANGE UP (ref 17–32)
CREAT SERPL-MCNC: 0.9 MG/DL — SIGNIFICANT CHANGE UP (ref 0.7–1.5)
EGFR: 86 ML/MIN/1.73M2 — SIGNIFICANT CHANGE UP
EGFR: 86 ML/MIN/1.73M2 — SIGNIFICANT CHANGE UP
EOSINOPHIL # BLD AUTO: 0.07 K/UL — SIGNIFICANT CHANGE UP (ref 0–0.5)
EOSINOPHIL NFR BLD AUTO: 2.5 % — SIGNIFICANT CHANGE UP (ref 0–6)
GLUCOSE SERPL-MCNC: 108 MG/DL — HIGH (ref 70–99)
HCT VFR BLD CALC: 30.9 % — LOW (ref 39–50)
HGB BLD-MCNC: 9.9 G/DL — LOW (ref 13–17)
IMM GRANULOCYTES # BLD AUTO: 0.01 K/UL — SIGNIFICANT CHANGE UP (ref 0–0.07)
IMM GRANULOCYTES NFR BLD AUTO: 0.4 % — SIGNIFICANT CHANGE UP (ref 0–0.9)
IMMATURE PLATELET FRACTION #: 3.4 K/UL — LOW (ref 3.9–12.5)
IMMATURE PLATELET FRACTION %: 8.1 % — HIGH (ref 1.6–7.1)
LYMPHOCYTES # BLD AUTO: 1.26 K/UL — SIGNIFICANT CHANGE UP (ref 1–3.3)
LYMPHOCYTES NFR BLD AUTO: 45.5 % — HIGH (ref 13–44)
MAGNESIUM SERPL-MCNC: 2 MG/DL — SIGNIFICANT CHANGE UP (ref 1.8–2.4)
MCHC RBC-ENTMCNC: 27.3 PG — SIGNIFICANT CHANGE UP (ref 27–34)
MCHC RBC-ENTMCNC: 32 G/DL — SIGNIFICANT CHANGE UP (ref 32–36)
MCV RBC AUTO: 85.1 FL — SIGNIFICANT CHANGE UP (ref 80–100)
MONOCYTES # BLD AUTO: 0.46 K/UL — SIGNIFICANT CHANGE UP (ref 0–0.9)
MONOCYTES NFR BLD AUTO: 16.6 % — HIGH (ref 2–14)
MRSA PCR RESULT.: SIGNIFICANT CHANGE UP
NEUTROPHILS # BLD AUTO: 0.88 K/UL — LOW (ref 1.8–7.4)
NEUTROPHILS NFR BLD AUTO: 31.8 % — LOW (ref 43–77)
NRBC # BLD AUTO: 0.02 K/UL — HIGH (ref 0–0)
NRBC # FLD: 0.02 K/UL — HIGH (ref 0–0)
NRBC BLD AUTO-RTO: 0 /100 WBCS — SIGNIFICANT CHANGE UP (ref 0–0)
PLATELET # BLD AUTO: 42 K/UL — LOW (ref 150–400)
PMV BLD: 11.1 FL — SIGNIFICANT CHANGE UP (ref 7–13)
POTASSIUM SERPL-MCNC: 3.7 MMOL/L — SIGNIFICANT CHANGE UP (ref 3.5–5)
POTASSIUM SERPL-SCNC: 3.7 MMOL/L — SIGNIFICANT CHANGE UP (ref 3.5–5)
PROT SERPL-MCNC: 7 G/DL — SIGNIFICANT CHANGE UP (ref 6–8)
RBC # BLD: 3.63 M/UL — LOW (ref 4.2–5.8)
RBC # FLD: 18.7 % — HIGH (ref 10.3–14.5)
S AUREUS DNA NOSE QL NAA+PROBE: DETECTED
SODIUM SERPL-SCNC: 139 MMOL/L — SIGNIFICANT CHANGE UP (ref 135–146)
WBC # BLD: 2.77 K/UL — LOW (ref 3.8–10.5)
WBC # FLD AUTO: 2.77 K/UL — LOW (ref 3.8–10.5)

## 2025-07-22 PROCEDURE — 99222 1ST HOSP IP/OBS MODERATE 55: CPT | Mod: FS

## 2025-07-22 PROCEDURE — 99223 1ST HOSP IP/OBS HIGH 75: CPT

## 2025-07-22 PROCEDURE — 71250 CT THORAX DX C-: CPT | Mod: 26

## 2025-07-22 PROCEDURE — 99233 SBSQ HOSP IP/OBS HIGH 50: CPT

## 2025-07-22 RX ORDER — HYDROXYZINE HYDROCHLORIDE 25 MG/1
25 TABLET, FILM COATED ORAL AT BEDTIME
Refills: 0 | Status: DISCONTINUED | OUTPATIENT
Start: 2025-07-22 | End: 2025-07-22

## 2025-07-22 RX ORDER — FUROSEMIDE 10 MG/ML
20 INJECTION INTRAMUSCULAR; INTRAVENOUS DAILY
Refills: 0 | Status: DISCONTINUED | OUTPATIENT
Start: 2025-07-23 | End: 2025-07-23

## 2025-07-22 RX ORDER — FILGRASTIM 300 UG/.5ML
300 INJECTION, SOLUTION INTRAVENOUS; SUBCUTANEOUS DAILY
Refills: 0 | Status: DISCONTINUED | OUTPATIENT
Start: 2025-07-23 | End: 2025-07-23

## 2025-07-22 RX ORDER — HYDROXYZINE HYDROCHLORIDE 25 MG/1
25 TABLET, FILM COATED ORAL ONCE
Refills: 0 | Status: COMPLETED | OUTPATIENT
Start: 2025-07-22 | End: 2025-07-22

## 2025-07-22 RX ORDER — TEMAZEPAM 15 MG/1
15 CAPSULE ORAL AT BEDTIME
Refills: 0 | Status: DISCONTINUED | OUTPATIENT
Start: 2025-07-22 | End: 2025-07-24

## 2025-07-22 RX ORDER — EPOETIN ALFA 10000 [IU]/ML
40000 SOLUTION INTRAVENOUS; SUBCUTANEOUS ONCE
Refills: 0 | Status: DISCONTINUED | OUTPATIENT
Start: 2025-07-22 | End: 2025-07-22

## 2025-07-22 RX ORDER — VANCOMYCIN HCL IN 5 % DEXTROSE 1.5G/250ML
500 PLASTIC BAG, INJECTION (ML) INTRAVENOUS EVERY 12 HOURS
Refills: 0 | Status: DISCONTINUED | OUTPATIENT
Start: 2025-07-22 | End: 2025-07-23

## 2025-07-22 RX ORDER — EPOETIN ALFA 10000 [IU]/ML
40000 SOLUTION INTRAVENOUS; SUBCUTANEOUS ONCE
Refills: 0 | Status: COMPLETED | OUTPATIENT
Start: 2025-07-22 | End: 2025-07-22

## 2025-07-22 RX ORDER — FUROSEMIDE 10 MG/ML
20 INJECTION INTRAMUSCULAR; INTRAVENOUS ONCE
Refills: 0 | Status: COMPLETED | OUTPATIENT
Start: 2025-07-22 | End: 2025-07-22

## 2025-07-22 RX ORDER — FILGRASTIM 300 UG/.5ML
300 INJECTION, SOLUTION INTRAVENOUS; SUBCUTANEOUS ONCE
Refills: 0 | Status: COMPLETED | OUTPATIENT
Start: 2025-07-22 | End: 2025-07-22

## 2025-07-22 RX ADMIN — Medication 100 MILLIGRAM(S): at 15:49

## 2025-07-22 RX ADMIN — FILGRASTIM 300 MICROGRAM(S): 300 INJECTION, SOLUTION INTRAVENOUS; SUBCUTANEOUS at 17:55

## 2025-07-22 RX ADMIN — IPRATROPIUM BROMIDE AND ALBUTEROL SULFATE 3 MILLILITER(S): .5; 2.5 SOLUTION RESPIRATORY (INHALATION) at 07:49

## 2025-07-22 RX ADMIN — Medication 1 APPLICATION(S): at 06:16

## 2025-07-22 RX ADMIN — Medication 650 MILLIGRAM(S): at 00:25

## 2025-07-22 RX ADMIN — CEFEPIME 100 MILLIGRAM(S): 2 INJECTION, POWDER, FOR SOLUTION INTRAVENOUS at 13:58

## 2025-07-22 RX ADMIN — Medication 1 DOSE(S): at 21:11

## 2025-07-22 RX ADMIN — CEFEPIME 100 MILLIGRAM(S): 2 INJECTION, POWDER, FOR SOLUTION INTRAVENOUS at 06:16

## 2025-07-22 RX ADMIN — FUROSEMIDE 20 MILLIGRAM(S): 10 INJECTION INTRAMUSCULAR; INTRAVENOUS at 15:08

## 2025-07-22 RX ADMIN — IPRATROPIUM BROMIDE AND ALBUTEROL SULFATE 3 MILLILITER(S): .5; 2.5 SOLUTION RESPIRATORY (INHALATION) at 01:27

## 2025-07-22 RX ADMIN — METOPROLOL SUCCINATE 50 MILLIGRAM(S): 50 TABLET, EXTENDED RELEASE ORAL at 06:16

## 2025-07-22 RX ADMIN — EPOETIN ALFA 40000 UNIT(S): 10000 SOLUTION INTRAVENOUS; SUBCUTANEOUS at 21:11

## 2025-07-22 RX ADMIN — Medication 650 MILLIGRAM(S): at 13:33

## 2025-07-22 RX ADMIN — FOLIC ACID 1 MILLIGRAM(S): 1 TABLET ORAL at 11:23

## 2025-07-22 RX ADMIN — TEMAZEPAM 15 MILLIGRAM(S): 15 CAPSULE ORAL at 22:09

## 2025-07-22 RX ADMIN — DEXTROMETHORPHAN HBR, GUAIFENESIN 10 MILLILITER(S): 20; 200 SOLUTION ORAL at 06:15

## 2025-07-22 RX ADMIN — Medication 1 TABLET(S): at 11:23

## 2025-07-22 RX ADMIN — Medication 100 MILLIGRAM(S): at 06:16

## 2025-07-22 RX ADMIN — Medication 100 MILLIGRAM(S): at 21:11

## 2025-07-22 RX ADMIN — Medication 650 MILLIGRAM(S): at 12:20

## 2025-07-22 RX ADMIN — IPRATROPIUM BROMIDE AND ALBUTEROL SULFATE 3 MILLILITER(S): .5; 2.5 SOLUTION RESPIRATORY (INHALATION) at 20:14

## 2025-07-22 RX ADMIN — Medication 5 MILLIGRAM(S): at 22:09

## 2025-07-22 RX ADMIN — Medication 200 MILLIGRAM(S): at 11:23

## 2025-07-22 RX ADMIN — HYDROXYZINE HYDROCHLORIDE 25 MILLIGRAM(S): 25 TABLET, FILM COATED ORAL at 02:19

## 2025-07-22 RX ADMIN — IPRATROPIUM BROMIDE AND ALBUTEROL SULFATE 3 MILLILITER(S): .5; 2.5 SOLUTION RESPIRATORY (INHALATION) at 13:59

## 2025-07-22 RX ADMIN — BUTYROSPERMUM PARKII(SHEA BUTTER), SIMMONDSIA CHINENSIS (JOJOBA) SEED OIL, ALOE BARBADENSIS LEAF EXTRACT 250 MILLIGRAM(S): .01; 1; 3.5 LIQUID TOPICAL at 06:17

## 2025-07-22 RX ADMIN — CEFEPIME 100 MILLIGRAM(S): 2 INJECTION, POWDER, FOR SOLUTION INTRAVENOUS at 22:08

## 2025-07-22 RX ADMIN — Medication 40 MILLIEQUIVALENT(S): at 15:07

## 2025-07-22 RX ADMIN — FUROSEMIDE 20 MILLIGRAM(S): 10 INJECTION INTRAMUSCULAR; INTRAVENOUS at 06:16

## 2025-07-22 RX ADMIN — Medication 1 DOSE(S): at 09:22

## 2025-07-22 RX ADMIN — BUTYROSPERMUM PARKII(SHEA BUTTER), SIMMONDSIA CHINENSIS (JOJOBA) SEED OIL, ALOE BARBADENSIS LEAF EXTRACT 250 MILLIGRAM(S): .01; 1; 3.5 LIQUID TOPICAL at 17:55

## 2025-07-23 LAB
ANION GAP SERPL CALC-SCNC: 12 MMOL/L — SIGNIFICANT CHANGE UP (ref 7–14)
BASOPHILS # BLD AUTO: 0.16 K/UL — SIGNIFICANT CHANGE UP (ref 0–0.2)
BASOPHILS NFR BLD AUTO: 1.4 % — SIGNIFICANT CHANGE UP (ref 0–2)
BLD GP AB SCN SERPL QL: SIGNIFICANT CHANGE UP
BUN SERPL-MCNC: 22 MG/DL — HIGH (ref 10–20)
CALCIUM SERPL-MCNC: 8.4 MG/DL — SIGNIFICANT CHANGE UP (ref 8.4–10.5)
CHLORIDE SERPL-SCNC: 102 MMOL/L — SIGNIFICANT CHANGE UP (ref 98–110)
CO2 SERPL-SCNC: 26 MMOL/L — SIGNIFICANT CHANGE UP (ref 17–32)
CREAT SERPL-MCNC: 0.9 MG/DL — SIGNIFICANT CHANGE UP (ref 0.7–1.5)
EGFR: 86 ML/MIN/1.73M2 — SIGNIFICANT CHANGE UP
EGFR: 86 ML/MIN/1.73M2 — SIGNIFICANT CHANGE UP
EOSINOPHIL # BLD AUTO: 0.04 K/UL — SIGNIFICANT CHANGE UP (ref 0–0.5)
EOSINOPHIL NFR BLD AUTO: 0.3 % — SIGNIFICANT CHANGE UP (ref 0–6)
FERRITIN SERPL-MCNC: 149 NG/ML — SIGNIFICANT CHANGE UP (ref 30–400)
FOLATE SERPL-MCNC: >20 NG/ML — SIGNIFICANT CHANGE UP
GLUCOSE SERPL-MCNC: 87 MG/DL — SIGNIFICANT CHANGE UP (ref 70–99)
HAPTOGLOB SERPL-MCNC: <20 MG/DL — LOW (ref 34–200)
HCT VFR BLD CALC: 30.6 % — LOW (ref 39–50)
HGB BLD-MCNC: 9.7 G/DL — LOW (ref 13–17)
IMM GRANULOCYTES # BLD AUTO: 0.13 K/UL — HIGH (ref 0–0.07)
IMM GRANULOCYTES NFR BLD AUTO: 1.1 % — HIGH (ref 0–0.9)
IMMATURE PLATELET FRACTION #: 3.6 K/UL — LOW (ref 3.9–12.5)
IMMATURE PLATELET FRACTION %: 7.9 % — HIGH (ref 1.6–7.1)
IMMATURE RETICULOCYTE FRACTION %: 20.5 % — SIGNIFICANT CHANGE UP
IRON SATN MFR SERPL: 17 % — SIGNIFICANT CHANGE UP (ref 15–50)
IRON SATN MFR SERPL: 25 UG/DL — LOW (ref 35–150)
LDH SERPL L TO P-CCNC: 467 U/L — HIGH (ref 50–242)
LEGIONELLA AG UR QL: NEGATIVE — SIGNIFICANT CHANGE UP
LYMPHOCYTES # BLD AUTO: 1.84 K/UL — SIGNIFICANT CHANGE UP (ref 1–3.3)
LYMPHOCYTES NFR BLD AUTO: 16 % — SIGNIFICANT CHANGE UP (ref 13–44)
MAGNESIUM SERPL-MCNC: 2.1 MG/DL — SIGNIFICANT CHANGE UP (ref 1.8–2.4)
MCHC RBC-ENTMCNC: 27.1 PG — SIGNIFICANT CHANGE UP (ref 27–34)
MCHC RBC-ENTMCNC: 31.7 G/DL — LOW (ref 32–36)
MCV RBC AUTO: 85.5 FL — SIGNIFICANT CHANGE UP (ref 80–100)
MONOCYTES # BLD AUTO: 1.13 K/UL — HIGH (ref 0–0.9)
MONOCYTES NFR BLD AUTO: 9.8 % — SIGNIFICANT CHANGE UP (ref 2–14)
NEUTROPHILS # BLD AUTO: 8.22 K/UL — HIGH (ref 1.8–7.4)
NEUTROPHILS NFR BLD AUTO: 71.4 % — SIGNIFICANT CHANGE UP (ref 43–77)
NRBC # BLD AUTO: 0.02 K/UL — HIGH (ref 0–0)
NRBC # FLD: 0.02 K/UL — HIGH (ref 0–0)
PLATELET # BLD AUTO: 46 K/UL — LOW (ref 150–400)
PMV BLD: SIGNIFICANT CHANGE UP FL (ref 7–13)
POTASSIUM SERPL-MCNC: 4.3 MMOL/L — SIGNIFICANT CHANGE UP (ref 3.5–5)
POTASSIUM SERPL-SCNC: 4.3 MMOL/L — SIGNIFICANT CHANGE UP (ref 3.5–5)
PROT SERPL-MCNC: 7 G/DL — SIGNIFICANT CHANGE UP (ref 6–8.3)
RBC # BLD: 3.58 M/UL — LOW (ref 4.2–5.8)
RBC # BLD: 3.58 M/UL — LOW (ref 4.2–5.8)
RBC # FLD: 18.6 % — HIGH (ref 10.3–14.5)
RETICS #: 0.1 K/UL — LOW (ref 25–125)
RETICS/RBC NFR: 3.7 % — HIGH (ref 0.5–2.5)
RETICULOCYTE HEMOGLOBIN EQUIVALENT: 24.2 PG — LOW (ref 36–38.6)
S PNEUM AG UR QL: NEGATIVE — SIGNIFICANT CHANGE UP
SODIUM SERPL-SCNC: 140 MMOL/L — SIGNIFICANT CHANGE UP (ref 135–146)
TIBC SERPL-MCNC: 146 UG/DL — LOW (ref 220–430)
UIBC SERPL-MCNC: 121 UG/DL — SIGNIFICANT CHANGE UP (ref 110–370)
VIT B12 SERPL-MCNC: 655 PG/ML — SIGNIFICANT CHANGE UP (ref 232–1245)
WBC # BLD: 11.52 K/UL — HIGH (ref 3.8–10.5)
WBC # FLD AUTO: 11.52 K/UL — HIGH (ref 3.8–10.5)

## 2025-07-23 PROCEDURE — 99233 SBSQ HOSP IP/OBS HIGH 50: CPT

## 2025-07-23 PROCEDURE — 99232 SBSQ HOSP IP/OBS MODERATE 35: CPT

## 2025-07-23 PROCEDURE — 88291 CYTO/MOLECULAR REPORT: CPT

## 2025-07-23 RX ORDER — MIDODRINE HYDROCHLORIDE 5 MG/1
10 TABLET ORAL EVERY 8 HOURS
Refills: 0 | Status: DISCONTINUED | OUTPATIENT
Start: 2025-07-23 | End: 2025-07-25

## 2025-07-23 RX ADMIN — CEFEPIME 100 MILLIGRAM(S): 2 INJECTION, POWDER, FOR SOLUTION INTRAVENOUS at 21:10

## 2025-07-23 RX ADMIN — Medication 200 MILLIGRAM(S): at 12:57

## 2025-07-23 RX ADMIN — BUTYROSPERMUM PARKII(SHEA BUTTER), SIMMONDSIA CHINENSIS (JOJOBA) SEED OIL, ALOE BARBADENSIS LEAF EXTRACT 250 MILLIGRAM(S): .01; 1; 3.5 LIQUID TOPICAL at 05:34

## 2025-07-23 RX ADMIN — Medication 1 APPLICATION(S): at 05:33

## 2025-07-23 RX ADMIN — FOLIC ACID 1 MILLIGRAM(S): 1 TABLET ORAL at 12:58

## 2025-07-23 RX ADMIN — Medication 1 TABLET(S): at 12:57

## 2025-07-23 RX ADMIN — BUTYROSPERMUM PARKII(SHEA BUTTER), SIMMONDSIA CHINENSIS (JOJOBA) SEED OIL, ALOE BARBADENSIS LEAF EXTRACT 250 MILLIGRAM(S): .01; 1; 3.5 LIQUID TOPICAL at 18:02

## 2025-07-23 RX ADMIN — MIDODRINE HYDROCHLORIDE 10 MILLIGRAM(S): 5 TABLET ORAL at 21:10

## 2025-07-23 RX ADMIN — TEMAZEPAM 15 MILLIGRAM(S): 15 CAPSULE ORAL at 21:12

## 2025-07-23 RX ADMIN — IPRATROPIUM BROMIDE AND ALBUTEROL SULFATE 3 MILLILITER(S): .5; 2.5 SOLUTION RESPIRATORY (INHALATION) at 07:30

## 2025-07-23 RX ADMIN — Medication 1 DOSE(S): at 09:08

## 2025-07-23 RX ADMIN — Medication 1 DOSE(S): at 21:03

## 2025-07-23 RX ADMIN — MIDODRINE HYDROCHLORIDE 10 MILLIGRAM(S): 5 TABLET ORAL at 13:46

## 2025-07-23 RX ADMIN — IPRATROPIUM BROMIDE AND ALBUTEROL SULFATE 3 MILLILITER(S): .5; 2.5 SOLUTION RESPIRATORY (INHALATION) at 19:32

## 2025-07-23 RX ADMIN — Medication 5 MILLIGRAM(S): at 21:10

## 2025-07-23 RX ADMIN — IPRATROPIUM BROMIDE AND ALBUTEROL SULFATE 3 MILLILITER(S): .5; 2.5 SOLUTION RESPIRATORY (INHALATION) at 14:12

## 2025-07-23 RX ADMIN — MIDODRINE HYDROCHLORIDE 10 MILLIGRAM(S): 5 TABLET ORAL at 05:51

## 2025-07-23 RX ADMIN — IPRATROPIUM BROMIDE AND ALBUTEROL SULFATE 3 MILLILITER(S): .5; 2.5 SOLUTION RESPIRATORY (INHALATION) at 01:20

## 2025-07-23 RX ADMIN — Medication 100 MILLIGRAM(S): at 05:34

## 2025-07-23 RX ADMIN — CEFEPIME 100 MILLIGRAM(S): 2 INJECTION, POWDER, FOR SOLUTION INTRAVENOUS at 13:44

## 2025-07-23 RX ADMIN — Medication 10 MILLIEQUIVALENT(S): at 12:58

## 2025-07-23 RX ADMIN — CEFEPIME 100 MILLIGRAM(S): 2 INJECTION, POWDER, FOR SOLUTION INTRAVENOUS at 05:34

## 2025-07-23 RX ADMIN — FILGRASTIM 300 MICROGRAM(S): 300 INJECTION, SOLUTION INTRAVENOUS; SUBCUTANEOUS at 12:56

## 2025-07-24 ENCOUNTER — RESULT REVIEW (OUTPATIENT)
Age: 80
End: 2025-07-24

## 2025-07-24 LAB
ALBUMIN SERPL ELPH-MCNC: 2.7 G/DL — LOW (ref 3.5–5.2)
ALP SERPL-CCNC: 72 U/L — SIGNIFICANT CHANGE UP (ref 30–115)
ALT FLD-CCNC: 16 U/L — SIGNIFICANT CHANGE UP (ref 0–41)
ANION GAP SERPL CALC-SCNC: 11 MMOL/L — SIGNIFICANT CHANGE UP (ref 7–14)
AST SERPL-CCNC: 24 U/L — SIGNIFICANT CHANGE UP (ref 0–41)
BASOPHILS # BLD AUTO: 0.23 K/UL — HIGH (ref 0–0.2)
BASOPHILS NFR BLD AUTO: 1.7 % — SIGNIFICANT CHANGE UP (ref 0–2)
BILIRUB SERPL-MCNC: 1 MG/DL — SIGNIFICANT CHANGE UP (ref 0.2–1.2)
BUN SERPL-MCNC: 19 MG/DL — SIGNIFICANT CHANGE UP (ref 10–20)
CALCIUM SERPL-MCNC: 8.5 MG/DL — SIGNIFICANT CHANGE UP (ref 8.4–10.5)
CHLORIDE SERPL-SCNC: 103 MMOL/L — SIGNIFICANT CHANGE UP (ref 98–110)
CO2 SERPL-SCNC: 25 MMOL/L — SIGNIFICANT CHANGE UP (ref 17–32)
CREAT SERPL-MCNC: 0.8 MG/DL — SIGNIFICANT CHANGE UP (ref 0.7–1.5)
CULTURE RESULTS: SIGNIFICANT CHANGE UP
CULTURE RESULTS: SIGNIFICANT CHANGE UP
DIR ANTIGLOB POLYSPECIFIC INTERPRETATION: SIGNIFICANT CHANGE UP
EGFR: 89 ML/MIN/1.73M2 — SIGNIFICANT CHANGE UP
EGFR: 89 ML/MIN/1.73M2 — SIGNIFICANT CHANGE UP
EOSINOPHIL # BLD AUTO: 0.12 K/UL — SIGNIFICANT CHANGE UP (ref 0–0.5)
EOSINOPHIL NFR BLD AUTO: 0.9 % — SIGNIFICANT CHANGE UP (ref 0–6)
GLUCOSE SERPL-MCNC: 95 MG/DL — SIGNIFICANT CHANGE UP (ref 70–99)
HCT VFR BLD CALC: 30.6 % — LOW (ref 39–50)
HGB BLD-MCNC: 9.5 G/DL — LOW (ref 13–17)
IMM GRANULOCYTES # BLD AUTO: 0.08 K/UL — HIGH (ref 0–0.07)
IMM GRANULOCYTES NFR BLD AUTO: 0.6 % — SIGNIFICANT CHANGE UP (ref 0–0.9)
IMMATURE PLATELET FRACTION #: 4 K/UL — SIGNIFICANT CHANGE UP (ref 3.9–12.5)
IMMATURE PLATELET FRACTION %: 9 % — HIGH (ref 1.6–7.1)
KAPPA LC SER QL IFE: 10.72 MG/DL — HIGH (ref 0.33–1.94)
KAPPA/LAMBDA FREE LIGHT CHAIN RATIO, SERUM: 1.29 RATIO — SIGNIFICANT CHANGE UP (ref 0.26–1.65)
LAMBDA LC SER QL IFE: 8.31 MG/DL — HIGH (ref 0.57–2.63)
LYMPHOCYTES # BLD AUTO: 1.42 K/UL — SIGNIFICANT CHANGE UP (ref 1–3.3)
LYMPHOCYTES NFR BLD AUTO: 10.7 % — LOW (ref 13–44)
MCHC RBC-ENTMCNC: 27.1 PG — SIGNIFICANT CHANGE UP (ref 27–34)
MCHC RBC-ENTMCNC: 31 G/DL — LOW (ref 32–36)
MCV RBC AUTO: 87.4 FL — SIGNIFICANT CHANGE UP (ref 80–100)
MONOCYTES # BLD AUTO: 0.62 K/UL — SIGNIFICANT CHANGE UP (ref 0–0.9)
MONOCYTES NFR BLD AUTO: 4.7 % — SIGNIFICANT CHANGE UP (ref 2–14)
NEUTROPHILS # BLD AUTO: 10.82 K/UL — HIGH (ref 1.8–7.4)
NEUTROPHILS NFR BLD AUTO: 81.4 % — HIGH (ref 43–77)
NRBC # BLD AUTO: 0.02 K/UL — HIGH (ref 0–0)
NRBC # FLD: 0.02 K/UL — HIGH (ref 0–0)
NRBC BLD AUTO-RTO: 0 /100 WBCS — SIGNIFICANT CHANGE UP (ref 0–0)
PLATELET # BLD AUTO: 44 K/UL — LOW (ref 150–400)
PMV BLD: 11.6 FL — SIGNIFICANT CHANGE UP (ref 7–13)
POTASSIUM SERPL-MCNC: 4.1 MMOL/L — SIGNIFICANT CHANGE UP (ref 3.5–5)
POTASSIUM SERPL-SCNC: 4.1 MMOL/L — SIGNIFICANT CHANGE UP (ref 3.5–5)
PROT SERPL-MCNC: 6.5 G/DL — SIGNIFICANT CHANGE UP (ref 6–8)
RBC # BLD: 3.5 M/UL — LOW (ref 4.2–5.8)
RBC # FLD: 19.3 % — HIGH (ref 10.3–14.5)
SODIUM SERPL-SCNC: 139 MMOL/L — SIGNIFICANT CHANGE UP (ref 135–146)
SPECIMEN SOURCE: SIGNIFICANT CHANGE UP
SPECIMEN SOURCE: SIGNIFICANT CHANGE UP
WBC # BLD: 13.29 K/UL — HIGH (ref 3.8–10.5)
WBC # FLD AUTO: 13.29 K/UL — HIGH (ref 3.8–10.5)

## 2025-07-24 PROCEDURE — 93306 TTE W/DOPPLER COMPLETE: CPT | Mod: 26

## 2025-07-24 PROCEDURE — 99232 SBSQ HOSP IP/OBS MODERATE 35: CPT

## 2025-07-24 PROCEDURE — 99232 SBSQ HOSP IP/OBS MODERATE 35: CPT | Mod: FS

## 2025-07-24 PROCEDURE — 99233 SBSQ HOSP IP/OBS HIGH 50: CPT

## 2025-07-24 RX ORDER — FUROSEMIDE 10 MG/ML
20 INJECTION INTRAMUSCULAR; INTRAVENOUS DAILY
Refills: 0 | Status: DISCONTINUED | OUTPATIENT
Start: 2025-07-24 | End: 2025-07-25

## 2025-07-24 RX ADMIN — MIDODRINE HYDROCHLORIDE 10 MILLIGRAM(S): 5 TABLET ORAL at 21:47

## 2025-07-24 RX ADMIN — CEFEPIME 100 MILLIGRAM(S): 2 INJECTION, POWDER, FOR SOLUTION INTRAVENOUS at 06:06

## 2025-07-24 RX ADMIN — Medication 1 APPLICATION(S): at 06:06

## 2025-07-24 RX ADMIN — BUTYROSPERMUM PARKII(SHEA BUTTER), SIMMONDSIA CHINENSIS (JOJOBA) SEED OIL, ALOE BARBADENSIS LEAF EXTRACT 250 MILLIGRAM(S): .01; 1; 3.5 LIQUID TOPICAL at 17:38

## 2025-07-24 RX ADMIN — TEMAZEPAM 15 MILLIGRAM(S): 15 CAPSULE ORAL at 21:47

## 2025-07-24 RX ADMIN — Medication 200 MILLIGRAM(S): at 11:03

## 2025-07-24 RX ADMIN — FOLIC ACID 1 MILLIGRAM(S): 1 TABLET ORAL at 11:03

## 2025-07-24 RX ADMIN — Medication 1 DOSE(S): at 08:13

## 2025-07-24 RX ADMIN — CEFEPIME 100 MILLIGRAM(S): 2 INJECTION, POWDER, FOR SOLUTION INTRAVENOUS at 13:21

## 2025-07-24 RX ADMIN — IPRATROPIUM BROMIDE AND ALBUTEROL SULFATE 3 MILLILITER(S): .5; 2.5 SOLUTION RESPIRATORY (INHALATION) at 19:06

## 2025-07-24 RX ADMIN — MIDODRINE HYDROCHLORIDE 10 MILLIGRAM(S): 5 TABLET ORAL at 13:21

## 2025-07-24 RX ADMIN — CEFEPIME 100 MILLIGRAM(S): 2 INJECTION, POWDER, FOR SOLUTION INTRAVENOUS at 21:47

## 2025-07-24 RX ADMIN — BUTYROSPERMUM PARKII(SHEA BUTTER), SIMMONDSIA CHINENSIS (JOJOBA) SEED OIL, ALOE BARBADENSIS LEAF EXTRACT 250 MILLIGRAM(S): .01; 1; 3.5 LIQUID TOPICAL at 06:06

## 2025-07-24 RX ADMIN — Medication 10 MILLIEQUIVALENT(S): at 11:03

## 2025-07-24 RX ADMIN — IPRATROPIUM BROMIDE AND ALBUTEROL SULFATE 3 MILLILITER(S): .5; 2.5 SOLUTION RESPIRATORY (INHALATION) at 13:57

## 2025-07-24 RX ADMIN — IPRATROPIUM BROMIDE AND ALBUTEROL SULFATE 3 MILLILITER(S): .5; 2.5 SOLUTION RESPIRATORY (INHALATION) at 07:33

## 2025-07-24 RX ADMIN — Medication 1 DOSE(S): at 21:51

## 2025-07-24 RX ADMIN — MIDODRINE HYDROCHLORIDE 10 MILLIGRAM(S): 5 TABLET ORAL at 06:05

## 2025-07-24 RX ADMIN — METOPROLOL SUCCINATE 50 MILLIGRAM(S): 50 TABLET, EXTENDED RELEASE ORAL at 06:06

## 2025-07-24 RX ADMIN — Medication 5 MILLIGRAM(S): at 21:47

## 2025-07-24 RX ADMIN — Medication 1 TABLET(S): at 11:03

## 2025-07-25 ENCOUNTER — TRANSCRIPTION ENCOUNTER (OUTPATIENT)
Age: 80
End: 2025-07-25

## 2025-07-25 VITALS
RESPIRATION RATE: 18 BRPM | HEART RATE: 82 BPM | OXYGEN SATURATION: 92 % | TEMPERATURE: 98 F | SYSTOLIC BLOOD PRESSURE: 101 MMHG | DIASTOLIC BLOOD PRESSURE: 56 MMHG

## 2025-07-25 LAB
ALBUMIN SERPL ELPH-MCNC: 2.8 G/DL — LOW (ref 3.5–5.2)
ALP SERPL-CCNC: 78 U/L — SIGNIFICANT CHANGE UP (ref 30–115)
ALT FLD-CCNC: 24 U/L — SIGNIFICANT CHANGE UP (ref 0–41)
ANION GAP SERPL CALC-SCNC: 9 MMOL/L — SIGNIFICANT CHANGE UP (ref 7–14)
AST SERPL-CCNC: 26 U/L — SIGNIFICANT CHANGE UP (ref 0–41)
BASOPHILS # BLD AUTO: 0.16 K/UL — SIGNIFICANT CHANGE UP (ref 0–0.2)
BASOPHILS NFR BLD AUTO: 1.7 % — SIGNIFICANT CHANGE UP (ref 0–2)
BILIRUB SERPL-MCNC: 0.9 MG/DL — SIGNIFICANT CHANGE UP (ref 0.2–1.2)
BUN SERPL-MCNC: 20 MG/DL — SIGNIFICANT CHANGE UP (ref 10–20)
C3 SERPL-MCNC: 83 MG/DL — SIGNIFICANT CHANGE UP (ref 81–157)
CALCIUM SERPL-MCNC: 8.4 MG/DL — SIGNIFICANT CHANGE UP (ref 8.4–10.5)
CHLORIDE SERPL-SCNC: 106 MMOL/L — SIGNIFICANT CHANGE UP (ref 98–110)
CO2 SERPL-SCNC: 24 MMOL/L — SIGNIFICANT CHANGE UP (ref 17–32)
CREAT SERPL-MCNC: 0.8 MG/DL — SIGNIFICANT CHANGE UP (ref 0.7–1.5)
CULTURE RESULTS: SIGNIFICANT CHANGE UP
EGFR: 89 ML/MIN/1.73M2 — SIGNIFICANT CHANGE UP
EGFR: 89 ML/MIN/1.73M2 — SIGNIFICANT CHANGE UP
EOSINOPHIL # BLD AUTO: 0.15 K/UL — SIGNIFICANT CHANGE UP (ref 0–0.5)
EOSINOPHIL NFR BLD AUTO: 1.5 % — SIGNIFICANT CHANGE UP (ref 0–6)
GLUCOSE SERPL-MCNC: 101 MG/DL — HIGH (ref 70–99)
HCT VFR BLD CALC: 31.3 % — LOW (ref 39–50)
HGB BLD-MCNC: 9.5 G/DL — LOW (ref 13–17)
IMM GRANULOCYTES # BLD AUTO: 0.07 K/UL — SIGNIFICANT CHANGE UP (ref 0–0.07)
IMM GRANULOCYTES NFR BLD AUTO: 0.7 % — SIGNIFICANT CHANGE UP (ref 0–0.9)
IMMATURE PLATELET FRACTION #: 3.9 K/UL — SIGNIFICANT CHANGE UP (ref 3.9–12.5)
IMMATURE PLATELET FRACTION %: 7.1 % — SIGNIFICANT CHANGE UP (ref 1.6–7.1)
LYMPHOCYTES # BLD AUTO: 1.1 K/UL — SIGNIFICANT CHANGE UP (ref 1–3.3)
LYMPHOCYTES NFR BLD AUTO: 11.4 % — LOW (ref 13–44)
MAGNESIUM SERPL-MCNC: 2.4 MG/DL — SIGNIFICANT CHANGE UP (ref 1.8–2.4)
MCHC RBC-ENTMCNC: 26.9 PG — LOW (ref 27–34)
MCHC RBC-ENTMCNC: 30.4 G/DL — LOW (ref 32–36)
MCV RBC AUTO: 88.7 FL — SIGNIFICANT CHANGE UP (ref 80–100)
MONOCYTES # BLD AUTO: 0.33 K/UL — SIGNIFICANT CHANGE UP (ref 0–0.9)
MONOCYTES NFR BLD AUTO: 3.4 % — SIGNIFICANT CHANGE UP (ref 2–14)
NEUTROPHILS # BLD AUTO: 7.88 K/UL — HIGH (ref 1.8–7.4)
NEUTROPHILS NFR BLD AUTO: 81.3 % — HIGH (ref 43–77)
NRBC # BLD AUTO: 0.02 K/UL — HIGH (ref 0–0)
NRBC # FLD: 0.02 K/UL — HIGH (ref 0–0)
NRBC BLD AUTO-RTO: 0 /100 WBCS — SIGNIFICANT CHANGE UP (ref 0–0)
PHOSPHATE SERPL-MCNC: 2.8 MG/DL — SIGNIFICANT CHANGE UP (ref 2.1–4.9)
PLATELET # BLD AUTO: 55 K/UL — LOW (ref 150–400)
PMV BLD: 11.5 FL — SIGNIFICANT CHANGE UP (ref 7–13)
POTASSIUM SERPL-MCNC: 4.2 MMOL/L — SIGNIFICANT CHANGE UP (ref 3.5–5)
POTASSIUM SERPL-SCNC: 4.2 MMOL/L — SIGNIFICANT CHANGE UP (ref 3.5–5)
PROT SERPL-MCNC: 6.6 G/DL — SIGNIFICANT CHANGE UP (ref 6–8)
RBC # BLD: 3.53 M/UL — LOW (ref 4.2–5.8)
RBC # FLD: 19.5 % — HIGH (ref 10.3–14.5)
SODIUM SERPL-SCNC: 139 MMOL/L — SIGNIFICANT CHANGE UP (ref 135–146)
SPECIMEN SOURCE: SIGNIFICANT CHANGE UP
WBC # BLD: 9.69 K/UL — SIGNIFICANT CHANGE UP (ref 3.8–10.5)
WBC # FLD AUTO: 9.69 K/UL — SIGNIFICANT CHANGE UP (ref 3.8–10.5)

## 2025-07-25 PROCEDURE — 99239 HOSP IP/OBS DSCHRG MGMT >30: CPT

## 2025-07-25 RX ORDER — ACETAMINOPHEN 500 MG/5ML
2 LIQUID (ML) ORAL
Qty: 0 | Refills: 0 | DISCHARGE
Start: 2025-07-25

## 2025-07-25 RX ADMIN — BUTYROSPERMUM PARKII(SHEA BUTTER), SIMMONDSIA CHINENSIS (JOJOBA) SEED OIL, ALOE BARBADENSIS LEAF EXTRACT 250 MILLIGRAM(S): .01; 1; 3.5 LIQUID TOPICAL at 05:50

## 2025-07-25 RX ADMIN — MIDODRINE HYDROCHLORIDE 10 MILLIGRAM(S): 5 TABLET ORAL at 05:50

## 2025-07-25 RX ADMIN — Medication 1 DOSE(S): at 12:33

## 2025-07-25 RX ADMIN — FUROSEMIDE 20 MILLIGRAM(S): 10 INJECTION INTRAMUSCULAR; INTRAVENOUS at 05:50

## 2025-07-25 RX ADMIN — IPRATROPIUM BROMIDE AND ALBUTEROL SULFATE 3 MILLILITER(S): .5; 2.5 SOLUTION RESPIRATORY (INHALATION) at 01:53

## 2025-07-25 RX ADMIN — Medication 200 MILLIGRAM(S): at 12:32

## 2025-07-25 RX ADMIN — Medication 1 APPLICATION(S): at 05:51

## 2025-07-25 RX ADMIN — IPRATROPIUM BROMIDE AND ALBUTEROL SULFATE 3 MILLILITER(S): .5; 2.5 SOLUTION RESPIRATORY (INHALATION) at 14:44

## 2025-07-25 RX ADMIN — Medication 1 TABLET(S): at 12:32

## 2025-07-25 RX ADMIN — METOPROLOL SUCCINATE 50 MILLIGRAM(S): 50 TABLET, EXTENDED RELEASE ORAL at 05:50

## 2025-07-25 RX ADMIN — FOLIC ACID 1 MILLIGRAM(S): 1 TABLET ORAL at 12:32

## 2025-07-25 RX ADMIN — Medication 10 MILLIEQUIVALENT(S): at 12:32

## 2025-07-25 RX ADMIN — CEFEPIME 100 MILLIGRAM(S): 2 INJECTION, POWDER, FOR SOLUTION INTRAVENOUS at 05:51

## 2025-07-25 RX ADMIN — MIDODRINE HYDROCHLORIDE 10 MILLIGRAM(S): 5 TABLET ORAL at 12:33

## 2025-07-25 RX ADMIN — IPRATROPIUM BROMIDE AND ALBUTEROL SULFATE 3 MILLILITER(S): .5; 2.5 SOLUTION RESPIRATORY (INHALATION) at 07:31

## 2025-07-26 LAB — FLOW CYTOMETRY FINAL REPORT: SIGNIFICANT CHANGE UP

## 2025-07-29 LAB
% ALBUMIN: 38.2 % — SIGNIFICANT CHANGE UP
% ALPHA 1: 9 % — SIGNIFICANT CHANGE UP
% ALPHA 2: 6.7 % — SIGNIFICANT CHANGE UP
% BETA: 13.4 % — SIGNIFICANT CHANGE UP
% GAMMA: 32.7 % — SIGNIFICANT CHANGE UP
ALBUMIN SERPL ELPH-MCNC: 2.7 G/DL — LOW (ref 3.6–5.5)
ALBUMIN/GLOB SERPL ELPH: 0.6 RATIO — SIGNIFICANT CHANGE UP
ALPHA1 GLOB SERPL ELPH-MCNC: 0.6 G/DL — HIGH (ref 0.1–0.4)
ALPHA2 GLOB SERPL ELPH-MCNC: 0.5 G/DL — SIGNIFICANT CHANGE UP (ref 0.5–1)
B-GLOBULIN SERPL ELPH-MCNC: 0.9 G/DL — SIGNIFICANT CHANGE UP (ref 0.5–1)
GAMMA GLOBULIN: 2.3 G/DL — HIGH (ref 0.6–1.6)
INTERPRETATION SERPL IFE-IMP: SIGNIFICANT CHANGE UP
PROT PATTERN SERPL ELPH-IMP: SIGNIFICANT CHANGE UP
PROT SERPL-MCNC: 7 G/DL — SIGNIFICANT CHANGE UP (ref 6–8.3)

## 2025-07-31 LAB
CHROM ANALY INTERPHASE BLD FISH-IMP: SIGNIFICANT CHANGE UP
CHROM ANALY OVERALL INTERP SPEC-IMP: SIGNIFICANT CHANGE UP

## 2025-08-01 DIAGNOSIS — M48.54XA COLLAPSED VERTEBRA, NOT ELSEWHERE CLASSIFIED, THORACIC REGION, INITIAL ENCOUNTER FOR FRACTURE: ICD-10-CM

## 2025-08-01 DIAGNOSIS — Z87.891 PERSONAL HISTORY OF NICOTINE DEPENDENCE: ICD-10-CM

## 2025-08-01 DIAGNOSIS — R91.1 SOLITARY PULMONARY NODULE: ICD-10-CM

## 2025-08-01 DIAGNOSIS — D46.9 MYELODYSPLASTIC SYNDROME, UNSPECIFIED: ICD-10-CM

## 2025-08-01 DIAGNOSIS — E87.6 HYPOKALEMIA: ICD-10-CM

## 2025-08-01 DIAGNOSIS — D61.818 OTHER PANCYTOPENIA: ICD-10-CM

## 2025-08-01 DIAGNOSIS — Z95.810 PRESENCE OF AUTOMATIC (IMPLANTABLE) CARDIAC DEFIBRILLATOR: ICD-10-CM

## 2025-08-01 DIAGNOSIS — I10 ESSENTIAL (PRIMARY) HYPERTENSION: ICD-10-CM

## 2025-08-01 DIAGNOSIS — J44.9 CHRONIC OBSTRUCTIVE PULMONARY DISEASE, UNSPECIFIED: ICD-10-CM

## 2025-08-01 DIAGNOSIS — I11.0 HYPERTENSIVE HEART DISEASE WITH HEART FAILURE: ICD-10-CM

## 2025-08-01 DIAGNOSIS — D70.9 NEUTROPENIA, UNSPECIFIED: ICD-10-CM

## 2025-08-01 DIAGNOSIS — E43 UNSPECIFIED SEVERE PROTEIN-CALORIE MALNUTRITION: ICD-10-CM

## 2025-08-01 DIAGNOSIS — N39.0 URINARY TRACT INFECTION, SITE NOT SPECIFIED: ICD-10-CM

## 2025-08-01 DIAGNOSIS — I50.22 CHRONIC SYSTOLIC (CONGESTIVE) HEART FAILURE: ICD-10-CM

## 2025-08-01 DIAGNOSIS — I25.5 ISCHEMIC CARDIOMYOPATHY: ICD-10-CM

## 2025-08-01 DIAGNOSIS — J18.9 PNEUMONIA, UNSPECIFIED ORGANISM: ICD-10-CM

## 2025-08-01 DIAGNOSIS — Z79.899 OTHER LONG TERM (CURRENT) DRUG THERAPY: ICD-10-CM

## 2025-08-14 ENCOUNTER — RX RENEWAL (OUTPATIENT)
Age: 80
End: 2025-08-14

## 2025-08-27 ENCOUNTER — APPOINTMENT (OUTPATIENT)
Dept: ELECTROPHYSIOLOGY | Facility: CLINIC | Age: 80
End: 2025-08-27